# Patient Record
Sex: MALE | Race: WHITE | NOT HISPANIC OR LATINO | Employment: OTHER | URBAN - METROPOLITAN AREA
[De-identification: names, ages, dates, MRNs, and addresses within clinical notes are randomized per-mention and may not be internally consistent; named-entity substitution may affect disease eponyms.]

---

## 2017-03-01 RX ORDER — COLCHICINE 0.6 MG/1
CAPSULE ORAL
Qty: 90 CAPSULE | Refills: 1 | Status: SHIPPED | OUTPATIENT
Start: 2017-03-01 | End: 2017-08-31 | Stop reason: SDUPTHER

## 2017-04-21 ENCOUNTER — OFFICE VISIT (OUTPATIENT)
Dept: RHEUMATOLOGY | Facility: CLINIC | Age: 77
End: 2017-04-21
Payer: MEDICARE

## 2017-04-21 VITALS
HEIGHT: 70 IN | SYSTOLIC BLOOD PRESSURE: 135 MMHG | DIASTOLIC BLOOD PRESSURE: 75 MMHG | WEIGHT: 222.81 LBS | HEART RATE: 65 BPM | BODY MASS INDEX: 31.9 KG/M2

## 2017-04-21 DIAGNOSIS — M11.80 CALCIUM PYROPHOSPHATE ARTHROPATHY: Primary | ICD-10-CM

## 2017-04-21 PROCEDURE — 99213 OFFICE O/P EST LOW 20 MIN: CPT | Mod: PBBFAC | Performed by: INTERNAL MEDICINE

## 2017-04-21 PROCEDURE — 99213 OFFICE O/P EST LOW 20 MIN: CPT | Mod: S$PBB,,, | Performed by: INTERNAL MEDICINE

## 2017-04-21 PROCEDURE — 99999 PR PBB SHADOW E&M-EST. PATIENT-LVL III: CPT | Mod: PBBFAC,,, | Performed by: INTERNAL MEDICINE

## 2017-04-26 NOTE — PROGRESS NOTES
History of present illness:  76-year-old gentleman I saw initially in 2013.  He presented with an inflammatory arthritis.  He had had a previous episode of knee swelling diagnosed as gout.  He also has a history of kidney stones.  Laboratory studies were unremarkable other than hyperparathyroidism.  He eventually underwent a parathyroidectomy.  Imaging revealed chondrocalcinosis and this was felt to be the most likely etiology of his inflammatory arthritis.  He is now taking prednisone twice a week.  He is still having problems with his lower back.  He had 3 epidural blocks without any response.  He has not been going to therapy.  The pain does radiate down the legs.  He has no numbness or tingling in his legs.  He remains on colchicine for his CPPD and his had no acute attacks.  He was started on alendronate and is tolerating it.  He had no response to gabapentin and he stopped it.  He has been taking Aleve as needed but it does not help.  His weight has decreased 25 pounds which she blames on a poor appetite.    Physical examination:  Musculoskeletal: He has bony hypertrophy of the PIPs and DIPs.  He has no synovitis in the hands.  Wrists, elbows, shoulders are unremarkable.  Lumbar spine was not examined.  He has bony hypertrophy of the knees.  He has no synovitis in the lower extremities.    Assessment:  1.  CPPD arthropathy  2.  Osteoarthritis    Plans: Continue medications as before.  Return to see me in 6 months.

## 2017-07-24 ENCOUNTER — PATIENT MESSAGE (OUTPATIENT)
Dept: RHEUMATOLOGY | Facility: CLINIC | Age: 77
End: 2017-07-24

## 2017-07-24 RX ORDER — PREDNISONE 2.5 MG/1
2.5 TABLET ORAL DAILY
Qty: 30 TABLET | Refills: 2 | Status: SHIPPED | OUTPATIENT
Start: 2017-07-24 | End: 2017-10-30 | Stop reason: SDUPTHER

## 2017-08-16 PROBLEM — T45.515A HEMORRHAGE WHILE ON WARFARIN THERAPY: Status: ACTIVE | Noted: 2017-08-16

## 2017-08-16 PROBLEM — R58 HEMORRHAGE WHILE ON WARFARIN THERAPY: Status: ACTIVE | Noted: 2017-08-16

## 2017-08-16 PROBLEM — S37.019A: Status: ACTIVE | Noted: 2017-08-16

## 2017-08-16 PROBLEM — K68.3 NONTRAUMATIC RETROPERITONEAL HEMATOMA: Status: ACTIVE | Noted: 2017-08-16

## 2017-08-18 PROBLEM — E11.9 TYPE 2 DIABETES MELLITUS: Status: ACTIVE | Noted: 2017-08-18

## 2017-08-31 RX ORDER — COLCHICINE 0.6 MG/1
CAPSULE ORAL
Qty: 90 CAPSULE | Refills: 1 | Status: SHIPPED | OUTPATIENT
Start: 2017-08-31 | End: 2018-08-13 | Stop reason: SDUPTHER

## 2017-10-18 ENCOUNTER — OFFICE VISIT (OUTPATIENT)
Dept: RHEUMATOLOGY | Facility: CLINIC | Age: 77
End: 2017-10-18
Payer: MEDICARE

## 2017-10-18 VITALS
SYSTOLIC BLOOD PRESSURE: 131 MMHG | WEIGHT: 220 LBS | BODY MASS INDEX: 31.57 KG/M2 | DIASTOLIC BLOOD PRESSURE: 84 MMHG | HEART RATE: 75 BPM

## 2017-10-18 DIAGNOSIS — M15.9 GENERALIZED OSTEOARTHRITIS: ICD-10-CM

## 2017-10-18 DIAGNOSIS — M11.20 CHONDROCALCINOSIS: Primary | ICD-10-CM

## 2017-10-18 PROCEDURE — 99213 OFFICE O/P EST LOW 20 MIN: CPT | Mod: PBBFAC | Performed by: INTERNAL MEDICINE

## 2017-10-18 PROCEDURE — 99213 OFFICE O/P EST LOW 20 MIN: CPT | Mod: S$PBB,,, | Performed by: INTERNAL MEDICINE

## 2017-10-18 PROCEDURE — 99999 PR PBB SHADOW E&M-EST. PATIENT-LVL III: CPT | Mod: PBBFAC,,, | Performed by: INTERNAL MEDICINE

## 2017-10-18 ASSESSMENT — ROUTINE ASSESSMENT OF PATIENT INDEX DATA (RAPID3)
TOTAL RAPID3 SCORE: 1.83
PATIENT GLOBAL ASSESSMENT SCORE: 3
PAIN SCORE: 1.5
FATIGUE SCORE: 4
MDHAQ FUNCTION SCORE: .3
AM STIFFNESS SCORE: 1, YES
PSYCHOLOGICAL DISTRESS SCORE: .2
WHEN YOU AWAKENED IN THE MORNING OVER THE LAST WEEK, PLEASE INDICATE THE AMOUNT OF TIME IT TAKES UNTIL YOU ARE AS LIMBER AS YOU WILL BE FOR THE DAY: 1 HR.

## 2017-10-25 NOTE — PROGRESS NOTES
History of present illness: 76-year-old gentleman with osteoarthritis.  He has also had a recurrence of an intermittent arthritis compatible with pseudogout.  He has had no recent attacks.  He continues to take colchicine and prednisone 5 mg daily.  He had an epidural block for sciatica which helped.  He is on chronic anticoagulation.  He was hospitalized recently for spontaneous renal hematoma.  He is otherwise been doing well.    Physical examination:  Musculoskeletal: He has decreased range of motion shoulders bilaterally.  Elbows and wrist are unremarkable.  He has bony hypertrophy of the PIPs and DIPs.  He has decreased  strength.  Lumbar spine was not examined.  He has postoperative changes in the knees.  Ankles and feet are unremarkable.    Assessment:  1.  Osteoarthritis  2.  Chondrocalcinosis  3.  Osteoporosis    Plans:  1.  I recommend he try to taper his prednisone  2.  Continue colchicine as before  3.  Continue alendronate.  He will be due for his next bone density in July.  4.  Return to see me in 6 months

## 2017-10-30 RX ORDER — PREDNISONE 2.5 MG/1
TABLET ORAL
Qty: 30 TABLET | Refills: 3 | Status: SHIPPED | OUTPATIENT
Start: 2017-10-30 | End: 2018-09-07

## 2017-10-30 RX ORDER — ALENDRONATE SODIUM 70 MG/1
TABLET ORAL
Qty: 4 TABLET | Refills: 12 | Status: SHIPPED | OUTPATIENT
Start: 2017-10-30 | End: 2019-02-19 | Stop reason: SDUPTHER

## 2018-04-18 ENCOUNTER — PATIENT MESSAGE (OUTPATIENT)
Dept: RHEUMATOLOGY | Facility: CLINIC | Age: 78
End: 2018-04-18

## 2018-08-09 RX ORDER — COLCHICINE 0.6 MG/1
CAPSULE ORAL
Qty: 90 CAPSULE | OUTPATIENT
Start: 2018-08-09

## 2018-08-13 ENCOUNTER — PATIENT MESSAGE (OUTPATIENT)
Dept: RHEUMATOLOGY | Facility: CLINIC | Age: 78
End: 2018-08-13

## 2018-08-14 RX ORDER — COLCHICINE 0.6 MG/1
1 CAPSULE ORAL DAILY
Qty: 90 CAPSULE | Refills: 0 | Status: SHIPPED | OUTPATIENT
Start: 2018-08-14 | End: 2018-09-24 | Stop reason: SDUPTHER

## 2018-09-07 ENCOUNTER — OFFICE VISIT (OUTPATIENT)
Dept: RHEUMATOLOGY | Facility: CLINIC | Age: 78
End: 2018-09-07
Payer: MEDICARE

## 2018-09-07 VITALS
BODY MASS INDEX: 31.35 KG/M2 | SYSTOLIC BLOOD PRESSURE: 133 MMHG | DIASTOLIC BLOOD PRESSURE: 84 MMHG | HEART RATE: 60 BPM | HEIGHT: 70 IN | WEIGHT: 219 LBS

## 2018-09-07 DIAGNOSIS — M15.9 GENERALIZED OSTEOARTHRITIS: ICD-10-CM

## 2018-09-07 DIAGNOSIS — M11.20 CHONDROCALCINOSIS: Primary | ICD-10-CM

## 2018-09-07 PROCEDURE — 99999 PR PBB SHADOW E&M-EST. PATIENT-LVL III: CPT | Mod: PBBFAC,,, | Performed by: INTERNAL MEDICINE

## 2018-09-07 PROCEDURE — 99213 OFFICE O/P EST LOW 20 MIN: CPT | Mod: S$PBB,,, | Performed by: INTERNAL MEDICINE

## 2018-09-07 PROCEDURE — 99213 OFFICE O/P EST LOW 20 MIN: CPT | Mod: PBBFAC | Performed by: INTERNAL MEDICINE

## 2018-09-07 RX ORDER — RIVAROXABAN 10 MG/1
10 TABLET, FILM COATED ORAL
Refills: 2 | COMMUNITY
Start: 2018-08-22

## 2018-09-07 RX ORDER — ESCITALOPRAM OXALATE 10 MG/1
10 TABLET ORAL DAILY
Refills: 0 | COMMUNITY
Start: 2018-08-22 | End: 2021-08-25

## 2018-09-24 RX ORDER — COLCHICINE 0.6 MG/1
CAPSULE ORAL
Qty: 90 CAPSULE | Refills: 0 | Status: SHIPPED | OUTPATIENT
Start: 2018-09-24 | End: 2019-04-09 | Stop reason: SDUPTHER

## 2019-02-19 DIAGNOSIS — M85.89 OSTEOPENIA OF MULTIPLE SITES: ICD-10-CM

## 2019-02-19 DIAGNOSIS — M85.839 OSTEOPENIA OF FOREARM, UNSPECIFIED LATERALITY: ICD-10-CM

## 2019-02-20 PROBLEM — M85.839 OSTEOPENIA OF FOREARM: Status: ACTIVE | Noted: 2019-02-20

## 2019-02-20 PROBLEM — M85.89 OSTEOPENIA OF MULTIPLE SITES: Status: ACTIVE | Noted: 2019-02-20

## 2019-02-20 RX ORDER — ALENDRONATE SODIUM 70 MG/1
TABLET ORAL
Qty: 4 TABLET | Refills: 2 | Status: SHIPPED | OUTPATIENT
Start: 2019-02-20 | End: 2019-06-03 | Stop reason: SDUPTHER

## 2019-04-09 RX ORDER — COLCHICINE 0.6 MG/1
TABLET ORAL
Qty: 90 TABLET | Refills: 0 | Status: SHIPPED | OUTPATIENT
Start: 2019-04-09 | End: 2019-08-30 | Stop reason: SDUPTHER

## 2019-06-03 DIAGNOSIS — M85.89 OSTEOPENIA OF MULTIPLE SITES: ICD-10-CM

## 2019-06-03 RX ORDER — ALENDRONATE SODIUM 70 MG/1
TABLET ORAL
Qty: 4 TABLET | Refills: 2 | Status: SHIPPED | OUTPATIENT
Start: 2019-06-03 | End: 2019-08-26 | Stop reason: SDUPTHER

## 2019-07-31 ENCOUNTER — PATIENT MESSAGE (OUTPATIENT)
Dept: RHEUMATOLOGY | Facility: CLINIC | Age: 79
End: 2019-07-31

## 2019-08-01 ENCOUNTER — TELEPHONE (OUTPATIENT)
Dept: RHEUMATOLOGY | Facility: CLINIC | Age: 79
End: 2019-08-01

## 2019-08-01 ENCOUNTER — PATIENT MESSAGE (OUTPATIENT)
Dept: RHEUMATOLOGY | Facility: CLINIC | Age: 79
End: 2019-08-01

## 2019-08-26 DIAGNOSIS — M85.89 OSTEOPENIA OF MULTIPLE SITES: ICD-10-CM

## 2019-08-26 RX ORDER — ALENDRONATE SODIUM 70 MG/1
TABLET ORAL
Qty: 4 TABLET | Refills: 2 | Status: SHIPPED | OUTPATIENT
Start: 2019-08-26 | End: 2019-11-22 | Stop reason: SDUPTHER

## 2019-08-30 RX ORDER — COLCHICINE 0.6 MG/1
TABLET ORAL
Qty: 90 TABLET | Refills: 0 | Status: SHIPPED | OUTPATIENT
Start: 2019-08-30 | End: 2020-01-10 | Stop reason: SDUPTHER

## 2019-11-22 DIAGNOSIS — M85.89 OSTEOPENIA OF MULTIPLE SITES: ICD-10-CM

## 2019-11-22 RX ORDER — ALENDRONATE SODIUM 70 MG/1
TABLET ORAL
Qty: 4 TABLET | Refills: 0 | Status: SHIPPED | OUTPATIENT
Start: 2019-11-22 | End: 2020-01-27

## 2020-01-10 ENCOUNTER — HOSPITAL ENCOUNTER (OUTPATIENT)
Dept: RADIOLOGY | Facility: CLINIC | Age: 80
Discharge: HOME | End: 2020-01-10
Attending: INTERNAL MEDICINE
Payer: MEDICARE

## 2020-01-10 ENCOUNTER — OFFICE VISIT (OUTPATIENT)
Dept: RHEUMATOLOGY | Facility: CLINIC | Age: 80
End: 2020-01-10
Payer: MEDICARE

## 2020-01-10 VITALS
HEIGHT: 70 IN | DIASTOLIC BLOOD PRESSURE: 82 MMHG | TEMPERATURE: 98 F | SYSTOLIC BLOOD PRESSURE: 128 MMHG | HEART RATE: 71 BPM | BODY MASS INDEX: 33.02 KG/M2 | WEIGHT: 230.63 LBS

## 2020-01-10 DIAGNOSIS — M15.9 GENERALIZED OSTEOARTHRITIS: ICD-10-CM

## 2020-01-10 DIAGNOSIS — M11.20 CHONDROCALCINOSIS: Primary | ICD-10-CM

## 2020-01-10 DIAGNOSIS — M85.89 OSTEOPENIA OF MULTIPLE SITES: ICD-10-CM

## 2020-01-10 PROCEDURE — 77080 DEXA BONE DENSITY SPINE HIP: ICD-10-PCS | Mod: 26,,, | Performed by: INTERNAL MEDICINE

## 2020-01-10 PROCEDURE — 1126F PR PAIN SEVERITY QUANTIFIED, NO PAIN PRESENT: ICD-10-PCS | Mod: ,,, | Performed by: INTERNAL MEDICINE

## 2020-01-10 PROCEDURE — 1159F PR MEDICATION LIST DOCUMENTED IN MEDICAL RECORD: ICD-10-PCS | Mod: ,,, | Performed by: INTERNAL MEDICINE

## 2020-01-10 PROCEDURE — 99213 PR OFFICE/OUTPT VISIT, EST, LEVL III, 20-29 MIN: ICD-10-PCS | Mod: S$PBB,,, | Performed by: INTERNAL MEDICINE

## 2020-01-10 PROCEDURE — 1126F AMNT PAIN NOTED NONE PRSNT: CPT | Mod: ,,, | Performed by: INTERNAL MEDICINE

## 2020-01-10 PROCEDURE — 99999 PR PBB SHADOW E&M-EST. PATIENT-LVL III: CPT | Mod: PBBFAC,,, | Performed by: INTERNAL MEDICINE

## 2020-01-10 PROCEDURE — 99999 PR PBB SHADOW E&M-EST. PATIENT-LVL III: ICD-10-PCS | Mod: PBBFAC,,, | Performed by: INTERNAL MEDICINE

## 2020-01-10 PROCEDURE — 99213 OFFICE O/P EST LOW 20 MIN: CPT | Mod: S$PBB,,, | Performed by: INTERNAL MEDICINE

## 2020-01-10 PROCEDURE — 99213 OFFICE O/P EST LOW 20 MIN: CPT | Mod: PBBFAC,25 | Performed by: INTERNAL MEDICINE

## 2020-01-10 PROCEDURE — 1159F MED LIST DOCD IN RCRD: CPT | Mod: ,,, | Performed by: INTERNAL MEDICINE

## 2020-01-10 PROCEDURE — 77080 DXA BONE DENSITY AXIAL: CPT | Mod: TC

## 2020-01-10 PROCEDURE — 77080 DXA BONE DENSITY AXIAL: CPT | Mod: 26,,, | Performed by: INTERNAL MEDICINE

## 2020-01-10 RX ORDER — COLCHICINE 0.6 MG/1
0.6 TABLET ORAL DAILY
Qty: 30 TABLET | Refills: 6 | Status: SHIPPED | OUTPATIENT
Start: 2020-01-10 | End: 2020-12-03

## 2020-01-10 ASSESSMENT — ROUTINE ASSESSMENT OF PATIENT INDEX DATA (RAPID3)
PSYCHOLOGICAL DISTRESS SCORE: 3.3
WHEN YOU AWAKENED IN THE MORNING OVER THE LAST WEEK, PLEASE INDICATE THE AMOUNT OF TIME IT TAKES UNTIL YOU ARE AS LIMBER AS YOU WILL BE FOR THE DAY: 1 HR
AM STIFFNESS SCORE: 1, YES
FATIGUE SCORE: 4.5
PATIENT GLOBAL ASSESSMENT SCORE: 4
MDHAQ FUNCTION SCORE: 1.6
TOTAL RAPID3 SCORE: 4.44
PAIN SCORE: 4

## 2020-01-11 NOTE — PROGRESS NOTES
History of present illness:  79-year-old gentleman with a long history of osteoarthritis.  He developed an intermittent inflammatory arthritis compatible with pseudogout.  He is on Xarelto chronically.  He cannot take anti-inflammatory medicines.  He had been taking colchicine.  He is unable to tolerated on a regular basis because of diarrhea.  It does help when he takes it.  He complains of increased aching in his hands when he is not taking colchicine.  He has had no joint swelling.  He had an episode of shingles which has resolved.  He has had no other recent medical problems.  He has also been on long term alendronate.    Physical examination:  Musculoskeletal:  Shoulders, elbows, wrists are unremarkable.  He has bony hypertrophy of the PIPs and DIPs.  He has no synovitis in the hands.  Hips have good range of motion without pain on range of motion.  He has bony hypertrophy of the knees but no effusion.  Ankles and feet are unremarkable.    Assessment:  1.  Osteoarthritis  2.  Chondrocalcinosis    Plans:  Continue to take colchicine intermittently.  2.  He may also take Tylenol as needed for pain  3.  Laboratory studies obtained  4.  I have referred him for a DEXA scan  5.  Return in 12 months

## 2020-01-27 DIAGNOSIS — M85.89 OSTEOPENIA OF MULTIPLE SITES: ICD-10-CM

## 2020-01-27 RX ORDER — ALENDRONATE SODIUM 70 MG/1
TABLET ORAL
Qty: 4 TABLET | Refills: 12 | Status: SHIPPED | OUTPATIENT
Start: 2020-01-27 | End: 2021-02-17

## 2020-11-20 ENCOUNTER — TELEPHONE (OUTPATIENT)
Dept: RHEUMATOLOGY | Facility: CLINIC | Age: 80
End: 2020-11-20

## 2020-11-20 NOTE — TELEPHONE ENCOUNTER
----- Message from Ej Costa MD sent at 11/19/2020  4:34 PM CST -----  Contact: [phone] -wife/Ceci  He needs to see a primary care provider to start the evaluation.  He lives in [place] which should be okay.  Otherwise he could see someone here in primary care.  ----- Message -----  From: Stephanie Griggs MA  Sent: 11/19/2020   2:48 PM CST  To: jE Costa MD      ----- Message -----  From: Julita English MA  Sent: 11/19/2020   2:40 PM CST  To: [facility] Staff      Current pt of Dr Costa, pts wife states that he has diarrhea, loss of appetite and weight loss and wanted to know if Dr Costa could recommended someone  for him to see to do a work up on him?   [phone] -wife/Ceci

## 2020-11-24 ENCOUNTER — TELEPHONE (OUTPATIENT)
Dept: RHEUMATOLOGY | Facility: CLINIC | Age: 80
End: 2020-11-24

## 2020-11-24 DIAGNOSIS — R63.0 ANOREXIA: ICD-10-CM

## 2020-11-24 DIAGNOSIS — R19.7 DIARRHEA, UNSPECIFIED TYPE: ICD-10-CM

## 2020-11-24 DIAGNOSIS — R63.4 WEIGHT LOSS: Primary | ICD-10-CM

## 2020-11-24 NOTE — TELEPHONE ENCOUNTER
Called to relay that we were able to get him an appt on Monday and if they needed anything else to contact that office or they could give us a call back

## 2020-11-24 NOTE — TELEPHONE ENCOUNTER
----- Message from Radha Snyder MA sent at 11/23/2020  2:52 PM CST -----  Contact: Ceci Howe Patient wife    ----- Message -----  From: Jeannie Gutierrez  Sent: 11/23/2020   2:02 PM CST  To: [facility] Staff    Ceci Howe, Patient wife is calling to speak with Nurse in regards to patient having diarrhea, loss of appetite and weight loss.  Also state that patient is on depression medication but it does not seem to be working.        Communication:  [phone]

## 2020-11-24 NOTE — TELEPHONE ENCOUNTER
He had seen his primary care doctor in [place] but she does not feel he is getting anywhere.  I will refer him to Internal Medicine.

## 2020-11-24 NOTE — TELEPHONE ENCOUNTER
Spoke to ms ortega on behalf of her  I gave them the phone number to internal medicine so they can set up an appt I also contacted jaime krishnamurthy to speed up the process with getting them in for an appt

## 2020-12-01 ENCOUNTER — PATIENT OUTREACH (OUTPATIENT)
Dept: ADMINISTRATIVE | Facility: HOSPITAL | Age: 80
End: 2020-12-01

## 2020-12-01 DIAGNOSIS — Z11.59 NEED FOR HEPATITIS C SCREENING TEST: Primary | ICD-10-CM

## 2020-12-03 ENCOUNTER — OFFICE VISIT (OUTPATIENT)
Dept: INTERNAL MEDICINE | Facility: CLINIC | Age: 80
End: 2020-12-03
Payer: MEDICARE

## 2020-12-03 ENCOUNTER — OFFICE VISIT (OUTPATIENT)
Dept: GASTROENTEROLOGY | Facility: CLINIC | Age: 80
End: 2020-12-03
Payer: MEDICARE

## 2020-12-03 ENCOUNTER — LAB VISIT (OUTPATIENT)
Dept: LAB | Facility: HOSPITAL | Age: 80
End: 2020-12-03
Attending: INTERNAL MEDICINE
Payer: MEDICARE

## 2020-12-03 VITALS
OXYGEN SATURATION: 97 % | WEIGHT: 219.81 LBS | SYSTOLIC BLOOD PRESSURE: 158 MMHG | BODY MASS INDEX: 31.47 KG/M2 | DIASTOLIC BLOOD PRESSURE: 86 MMHG | HEART RATE: 77 BPM | HEIGHT: 70 IN

## 2020-12-03 VITALS
SYSTOLIC BLOOD PRESSURE: 184 MMHG | HEIGHT: 70 IN | DIASTOLIC BLOOD PRESSURE: 99 MMHG | HEART RATE: 74 BPM | BODY MASS INDEX: 31.28 KG/M2 | WEIGHT: 218.5 LBS

## 2020-12-03 DIAGNOSIS — Z80.0 FAMILY HISTORY OF PANCREATIC CANCER: ICD-10-CM

## 2020-12-03 DIAGNOSIS — R10.13 EPIGASTRIC PAIN: ICD-10-CM

## 2020-12-03 DIAGNOSIS — E11.9 CONTROLLED TYPE 2 DIABETES MELLITUS WITHOUT COMPLICATION, WITHOUT LONG-TERM CURRENT USE OF INSULIN: ICD-10-CM

## 2020-12-03 DIAGNOSIS — Z80.0 FAMILY HISTORY OF LIVER CANCER: ICD-10-CM

## 2020-12-03 DIAGNOSIS — R19.7 DIARRHEA, UNSPECIFIED TYPE: ICD-10-CM

## 2020-12-03 DIAGNOSIS — R63.0 ANOREXIA: Primary | ICD-10-CM

## 2020-12-03 DIAGNOSIS — R63.0 ANOREXIA: ICD-10-CM

## 2020-12-03 DIAGNOSIS — R63.4 WEIGHT LOSS, UNINTENTIONAL: ICD-10-CM

## 2020-12-03 DIAGNOSIS — R68.81 EARLY SATIETY: ICD-10-CM

## 2020-12-03 DIAGNOSIS — R63.0 LOSS OF APPETITE: ICD-10-CM

## 2020-12-03 DIAGNOSIS — R63.4 WEIGHT LOSS: ICD-10-CM

## 2020-12-03 DIAGNOSIS — R19.7 DIARRHEA, UNSPECIFIED TYPE: Primary | ICD-10-CM

## 2020-12-03 DIAGNOSIS — E66.9 OBESITY (BMI 30-39.9): ICD-10-CM

## 2020-12-03 DIAGNOSIS — D50.9 IRON DEFICIENCY ANEMIA, UNSPECIFIED IRON DEFICIENCY ANEMIA TYPE: ICD-10-CM

## 2020-12-03 LAB
ALBUMIN SERPL BCP-MCNC: 3.9 G/DL (ref 3.5–5.2)
ALBUMIN SERPL BCP-MCNC: 3.9 G/DL (ref 3.5–5.2)
ALP SERPL-CCNC: 71 U/L (ref 55–135)
ALP SERPL-CCNC: 71 U/L (ref 55–135)
ALT SERPL W/O P-5'-P-CCNC: 12 U/L (ref 10–44)
ALT SERPL W/O P-5'-P-CCNC: 12 U/L (ref 10–44)
ANION GAP SERPL CALC-SCNC: 10 MMOL/L (ref 8–16)
ANION GAP SERPL CALC-SCNC: 10 MMOL/L (ref 8–16)
AST SERPL-CCNC: 23 U/L (ref 10–40)
AST SERPL-CCNC: 23 U/L (ref 10–40)
BASOPHILS # BLD AUTO: 0.03 K/UL (ref 0–0.2)
BASOPHILS NFR BLD: 0.5 % (ref 0–1.9)
BILIRUB DIRECT SERPL-MCNC: 0.7 MG/DL (ref 0.1–0.3)
BILIRUB SERPL-MCNC: 1.9 MG/DL (ref 0.1–1)
BILIRUB SERPL-MCNC: 1.9 MG/DL (ref 0.1–1)
BUN SERPL-MCNC: 12 MG/DL (ref 8–23)
BUN SERPL-MCNC: 12 MG/DL (ref 8–23)
CALCIUM SERPL-MCNC: 8.7 MG/DL (ref 8.7–10.5)
CALCIUM SERPL-MCNC: 8.7 MG/DL (ref 8.7–10.5)
CHLORIDE SERPL-SCNC: 105 MMOL/L (ref 95–110)
CHLORIDE SERPL-SCNC: 105 MMOL/L (ref 95–110)
CO2 SERPL-SCNC: 29 MMOL/L (ref 23–29)
CO2 SERPL-SCNC: 29 MMOL/L (ref 23–29)
CORTIS SERPL-MCNC: 5.7 UG/DL
CREAT SERPL-MCNC: 1.1 MG/DL (ref 0.5–1.4)
CREAT SERPL-MCNC: 1.1 MG/DL (ref 0.5–1.4)
DIFFERENTIAL METHOD: ABNORMAL
EOSINOPHIL # BLD AUTO: 0.2 K/UL (ref 0–0.5)
EOSINOPHIL NFR BLD: 2.7 % (ref 0–8)
ERYTHROCYTE [DISTWIDTH] IN BLOOD BY AUTOMATED COUNT: 13.9 % (ref 11.5–14.5)
EST. GFR  (AFRICAN AMERICAN): >60 ML/MIN/1.73 M^2
EST. GFR  (AFRICAN AMERICAN): >60 ML/MIN/1.73 M^2
EST. GFR  (NON AFRICAN AMERICAN): >60 ML/MIN/1.73 M^2
EST. GFR  (NON AFRICAN AMERICAN): >60 ML/MIN/1.73 M^2
ESTIMATED AVG GLUCOSE: 123 MG/DL (ref 68–131)
FERRITIN SERPL-MCNC: 17 NG/ML (ref 20–300)
GLUCOSE SERPL-MCNC: 84 MG/DL (ref 70–110)
GLUCOSE SERPL-MCNC: 84 MG/DL (ref 70–110)
HBA1C MFR BLD HPLC: 5.9 % (ref 4–5.6)
HCT VFR BLD AUTO: 36.3 % (ref 40–54)
HGB BLD-MCNC: 11.2 G/DL (ref 14–18)
IGA SERPL-MCNC: 234 MG/DL (ref 40–350)
IMM GRANULOCYTES # BLD AUTO: 0.01 K/UL (ref 0–0.04)
IMM GRANULOCYTES NFR BLD AUTO: 0.2 % (ref 0–0.5)
INR PPP: 1 (ref 0.8–1.2)
IRON SERPL-MCNC: 65 UG/DL (ref 45–160)
LIPASE SERPL-CCNC: 27 U/L (ref 4–60)
LYMPHOCYTES # BLD AUTO: 1.6 K/UL (ref 1–4.8)
LYMPHOCYTES NFR BLD: 29.3 % (ref 18–48)
MCH RBC QN AUTO: 29.4 PG (ref 27–31)
MCHC RBC AUTO-ENTMCNC: 30.9 G/DL (ref 32–36)
MCV RBC AUTO: 95 FL (ref 82–98)
MONOCYTES # BLD AUTO: 0.5 K/UL (ref 0.3–1)
MONOCYTES NFR BLD: 8.4 % (ref 4–15)
NEUTROPHILS # BLD AUTO: 3.2 K/UL (ref 1.8–7.7)
NEUTROPHILS NFR BLD: 58.9 % (ref 38–73)
NRBC BLD-RTO: 0 /100 WBC
PLATELET # BLD AUTO: 190 K/UL (ref 150–350)
PMV BLD AUTO: 10 FL (ref 9.2–12.9)
POTASSIUM SERPL-SCNC: 3.9 MMOL/L (ref 3.5–5.1)
POTASSIUM SERPL-SCNC: 3.9 MMOL/L (ref 3.5–5.1)
PROT SERPL-MCNC: 7.3 G/DL (ref 6–8.4)
PROT SERPL-MCNC: 7.3 G/DL (ref 6–8.4)
PROTHROMBIN TIME: 11.2 SEC (ref 9–12.5)
RBC # BLD AUTO: 3.81 M/UL (ref 4.6–6.2)
SATURATED IRON: 13 % (ref 20–50)
SODIUM SERPL-SCNC: 144 MMOL/L (ref 136–145)
SODIUM SERPL-SCNC: 144 MMOL/L (ref 136–145)
TOTAL IRON BINDING CAPACITY: 482 UG/DL (ref 250–450)
TRANSFERRIN SERPL-MCNC: 326 MG/DL (ref 200–375)
TSH SERPL DL<=0.005 MIU/L-ACNC: 3.02 UIU/ML (ref 0.4–4)
WBC # BLD AUTO: 5.5 K/UL (ref 3.9–12.7)

## 2020-12-03 PROCEDURE — 99999 PR PBB SHADOW E&M-EST. PATIENT-LVL III: CPT | Mod: PBBFAC,,, | Performed by: INTERNAL MEDICINE

## 2020-12-03 PROCEDURE — 83690 ASSAY OF LIPASE: CPT

## 2020-12-03 PROCEDURE — 99999 PR PBB SHADOW E&M-EST. PATIENT-LVL IV: ICD-10-PCS | Mod: PBBFAC,,, | Performed by: INTERNAL MEDICINE

## 2020-12-03 PROCEDURE — 86790 VIRUS ANTIBODY NOS: CPT

## 2020-12-03 PROCEDURE — 84443 ASSAY THYROID STIM HORMONE: CPT

## 2020-12-03 PROCEDURE — 82728 ASSAY OF FERRITIN: CPT

## 2020-12-03 PROCEDURE — 82941 ASSAY OF GASTRIN: CPT

## 2020-12-03 PROCEDURE — 87340 HEPATITIS B SURFACE AG IA: CPT

## 2020-12-03 PROCEDURE — 86706 HEP B SURFACE ANTIBODY: CPT

## 2020-12-03 PROCEDURE — 86803 HEPATITIS C AB TEST: CPT

## 2020-12-03 PROCEDURE — 99999 PR PBB SHADOW E&M-EST. PATIENT-LVL IV: CPT | Mod: PBBFAC,,, | Performed by: INTERNAL MEDICINE

## 2020-12-03 PROCEDURE — 83516 IMMUNOASSAY NONANTIBODY: CPT

## 2020-12-03 PROCEDURE — 99205 OFFICE O/P NEW HI 60 MIN: CPT | Mod: S$PBB,,, | Performed by: INTERNAL MEDICINE

## 2020-12-03 PROCEDURE — 82784 ASSAY IGA/IGD/IGG/IGM EACH: CPT

## 2020-12-03 PROCEDURE — 80053 COMPREHEN METABOLIC PANEL: CPT

## 2020-12-03 PROCEDURE — 99204 OFFICE O/P NEW MOD 45 MIN: CPT | Mod: S$PBB,,, | Performed by: INTERNAL MEDICINE

## 2020-12-03 PROCEDURE — 82533 TOTAL CORTISOL: CPT

## 2020-12-03 PROCEDURE — 83540 ASSAY OF IRON: CPT

## 2020-12-03 PROCEDURE — 99999 PR PBB SHADOW E&M-EST. PATIENT-LVL III: ICD-10-PCS | Mod: PBBFAC,,, | Performed by: INTERNAL MEDICINE

## 2020-12-03 PROCEDURE — 85610 PROTHROMBIN TIME: CPT

## 2020-12-03 PROCEDURE — 85025 COMPLETE CBC W/AUTO DIFF WBC: CPT

## 2020-12-03 PROCEDURE — 99205 PR OFFICE/OUTPT VISIT, NEW, LEVL V, 60-74 MIN: ICD-10-PCS | Mod: S$PBB,,, | Performed by: INTERNAL MEDICINE

## 2020-12-03 PROCEDURE — 83036 HEMOGLOBIN GLYCOSYLATED A1C: CPT

## 2020-12-03 PROCEDURE — 99204 PR OFFICE/OUTPT VISIT, NEW, LEVL IV, 45-59 MIN: ICD-10-PCS | Mod: S$PBB,,, | Performed by: INTERNAL MEDICINE

## 2020-12-03 PROCEDURE — 80076 HEPATIC FUNCTION PANEL: CPT

## 2020-12-03 PROCEDURE — 99214 OFFICE O/P EST MOD 30 MIN: CPT | Mod: PBBFAC,27 | Performed by: INTERNAL MEDICINE

## 2020-12-03 PROCEDURE — 99213 OFFICE O/P EST LOW 20 MIN: CPT | Mod: PBBFAC | Performed by: INTERNAL MEDICINE

## 2020-12-03 NOTE — PROGRESS NOTES
Subjective:       Patient ID: Placido Howe is a 79 y.o. male.    Chief Complaint: Diarrhea    79 year old man reports watery diarrhea 3-4 days a week, twice a day.  Has poor appetite,eating only one meal a day though he does eat snacks.  Eats lots of cheese.  Has been started on cholestipol by PCP in [place]  This helps intermittently.  I do not think he takes it consistently.  Has lost 11 pounds in the last 11 months    Review of Systems   Constitutional: Negative for activity change, appetite change and fever.   HENT: Negative for congestion, postnasal drip and sore throat.    Respiratory: Negative for cough, shortness of breath and wheezing.    Cardiovascular: Negative for chest pain and palpitations.   Gastrointestinal: Negative for abdominal pain, blood in stool, constipation, diarrhea, nausea and vomiting.   Genitourinary: Negative for decreased urine volume, difficulty urinating, flank pain and frequency.   Musculoskeletal: Negative for arthralgias.   Neurological: Negative for dizziness, weakness and headaches.       Objective:      Physical Exam  Constitutional:       General: He is not in acute distress.     Appearance: He is well-developed.       HENT:      Head: Normocephalic and atraumatic.      Right Ear: External ear normal.      Left Ear: External ear normal.   Eyes:      Conjunctiva/sclera: Conjunctivae normal.      Pupils: Pupils are equal, round, and reactive to light.   Neck:      Musculoskeletal: Normal range of motion and neck supple.      Thyroid: No thyromegaly.   Cardiovascular:      Rate and Rhythm: Normal rate and regular rhythm.   Pulmonary:      Effort: Pulmonary effort is normal.      Breath sounds: Normal breath sounds.   Abdominal:      General: Bowel sounds are normal.      Palpations: Abdomen is soft. There is no mass.      Tenderness: There is abdominal tenderness in the right upper quadrant and epigastric area. There is no right CVA tenderness, left CVA tenderness, guarding or  rebound.   Musculoskeletal: Normal range of motion.   Lymphadenopathy:      Cervical: No cervical adenopathy.   Skin:     General: Skin is warm and dry.   Neurological:      Mental Status: He is alert and oriented to person, place, and time.      Cranial Nerves: No cranial nerve deficit.      Motor: No abnormal muscle tone.      Coordination: Coordination normal.      Deep Tendon Reflexes: Reflexes are normal and symmetric. Reflexes normal.         Assessment:       1. Diarrhea, unspecified type    2. Loss of appetite    3. Controlled type 2 diabetes mellitus without complication, without long-term current use of insulin        Plan:   Placido was seen today for diarrhea.    Diagnoses and all orders for this visit:    Diarrhea, unspecified type  -     CBC Auto Differential; Future  -     Comprehensive Metabolic Panel; Future  -     Lipase; Future  -     Stool culture; Future  -     Stool Exam-Ova,Cysts,Parasites; Future  -     Ambulatory referral/consult to Gastroenterology; Future    Loss of appetite  -     CBC Auto Differential; Future  -     Comprehensive Metabolic Panel; Future  -     Lipase; Future  -     Ambulatory referral/consult to Gastroenterology; Future    Controlled type 2 diabetes mellitus without complication, without long-term current use of insulin  -     Hemoglobin A1C; Future

## 2020-12-03 NOTE — Clinical Note
VERY IMPORTANT:    Ira please schedule patient for lab work today on the 2nd floor orders placed    Please set him up for urgent CT scan abdomen pelvis pancreas mass protocol across the street at the [facility] [place] orders placed    Please schedule patient for stool studies x4  least 2 days apart 1 of the stools needs to be a solid stool please label that fecal elastase okay for him to turn that in close to home.    Please schedule patient to return to GI clinic appointment with me in 4 weeks for follow-up for weight loss diarrhea and abdominal

## 2020-12-03 NOTE — Clinical Note
Zarina please schedule Placido is soon as possible for EGD and upper EUS of the pancreas for further evaluation of his significant weight loss epigastric pain anti recs see a early satiety diarrhea and family history of pancreatic cancer in his first-degree relative his dad at age 63 referral placed

## 2020-12-03 NOTE — PROGRESS NOTES
[facility] Consultation Note    Reason for Consult:  The primary encounter diagnosis was Anorexia. Diagnoses of Diarrhea, unspecified type, Loss of appetite, Epigastric pain, Early satiety, Family history of pancreatic cancer, Family history of liver cancer, Iron deficiency anemia, unspecified iron deficiency anemia type, Weight loss, unintentional, and Obesity (BMI 30-39.9) were also pertinent to this visit.    PCP:   Mauricio Brewer   [address] [facility] [address]    Referring MD:  Baylee Patel Md  [address] [place] [address]    Initial History of Present Illness (HPI):  This is a 79 y.o. male here for evaluation of 1 year history of diarrhea early satiety anorexia weight loss.  Patient is here with his wife they live in [facility].  He says his colonoscopy was in the last year a too was normal.  But before his symptoms started.  He drinks alcohol but in moderation he does not smoke cigarettes his dad was diagnosed with pancreatic cancer at age 63 and  6 months and it may have cause metastasis to the liver.  Patient has decreased appetite anorexia epigastric pain and diarrhea.  He has also lost 6 significant amount of weight he used to weigh 230 lb he is down to 218 lb today.  He has not had a recent CT scan of his abdomen pelvis or any EGD.  No other family members with pancreatic cancer no other family members with any GI malignancies.  No FAP no attenuated FAP no MA P no Narayan syndrome no celiac sprue or inflammatory bowel disease.  No blood in his stool he is currently only having 1 bowel movement a day down from 3 but it is kind of pasty.  He denies seeing oral grease or fat in his stool.  No recent antibiotics no recent travel no drinking at a [employer] or [place] no new pets.    Abdominal pain - mild epigastric pain does not radiate to his back  Reflux - no  Dysphagia - no   Bowel habits - diarrhea  GI bleeding - none  NSAID usage -  yes    Interval HPI 12/03/2020:  The patient's last visit with me was on Visit date not found.      ROS:  Constitutional: No fevers, chills, positive for unintentional weight loss anorexia early satiety  ENT:  No heartburn no dysphagia no odynophagia no hoarseness  CV: No chest pain, no palpitation, patient has pacemaker  Pulm: No cough, No shortness of breath, no wheezing  Ophtho: No vision changes  GI: see HPI  Derm: No rash, no itching  Heme: No lymphadenopathy, No easy bruising  MSK:  Some arthritis  : No dysuria, No hematuria  Endo: No hot or cold intolerance  Neuro: No syncope, No seizure, no strokes  Psych: No uncontrolled anxiety, No uncontrolled depression    Medical History:  has a past medical history of Alopecia, Anemia, Anxiety, Arthritis, Deep vein thrombosis, Depression, Diabetes mellitus, Hyperlipidemia, Hypertension, Hypertension (4/3/2014), Impaired glucose tolerance (4/3/2014), Obesity (BMI 30-39.9) (4/3/2014), Pulmonary embolism, S/P parathyroidectomy (8/29/2014), Type 2 diabetes mellitus, uncontrolled (4/7/2014), Vitamin B12 deficiency (7/10/2014), and Vitamin D deficiency (7/10/2014).    Surgical History:  has a past surgical history that includes Cardiac pacemaker placement; Joint replacement; Knee arthroscopy w/ meniscal repair; Cholecystectomy; IVC filter retrieval; carpel tunnel; Thyroid surgery (08/2014); Cataract extraction (2001); Tonsillectomy; Carpal tunnel release (2013); Cardiac pacemaker placement; and Cardiac pacemaker placement.    Family History: family history includes Alzheimer's disease in his mother; Breast cancer in his sister; Cancer in his father; Cancer (age of onset: 8) in his brother; Diabetes Mellitus in his mother; Leukemia in his brother; Liver cancer (age of onset: 63) in his father; Pancreatic cancer (age of onset: 63) in his father; Rheum arthritis in his mother..     Social History:  reports that he has never smoked. He has never used smokeless tobacco. He  "reports current alcohol use. He reports that he does not use drugs.    Review of patient's allergies indicates:   Allergen Reactions    Demerol [meperidine] Other (See Comments)     Decreased bp       Medication List with Changes/Refills   Current Medications    ALENDRONATE (FOSAMAX) 70 MG TABLET    TAKE ONE TABLET BY MOUTH EVERY 7 DAYS    ALLOPURINOL (ZYLOPRIM) 300 MG TABLET    Take 300 mg by mouth every morning.     ALPRAZOLAM (XANAX) 0.5 MG TABLET    Take 0.5 mg by mouth 3 (three) times daily as needed.    ASPIRIN (ECOTRIN) 81 MG EC TABLET    Take 81 mg by mouth every evening.     ATORVASTATIN (LIPITOR) 40 MG TABLET    Take 40 mg by mouth every evening.     CYANOCOBALAMIN 1,000 MCG/ML INJECTION        ESCITALOPRAM OXALATE (LEXAPRO) 10 MG TABLET    Take 10 mg by mouth once daily.    GLUCOSAMINE HCL/CHONDR SHIELDS A NA (GLUCOSAMINE-CHONDROITIN) 1,500-1,200 MG/30 ML LIQD    Take by mouth.    HYDROCODONE-ACETAMINOPHEN 10-325MG (NORCO)  MG TAB    Take 1 tablet by mouth every 4 (four) hours as needed.    NITROSTAT 0.4 MG SL TABLET        PIOGLITAZONE (ACTOS) 30 MG TABLET    Take 30 mg by mouth every morning.     SOTALOL (BETAPACE) 120 MG TAB    Take 80 mg by mouth every 12 (twelve) hours.    SPIRONOLACTONE (ALDACTONE) 25 MG TABLET    Take 25 mg by mouth every evening. Takes half nightly    XARELTO 10 MG TAB    TAKE ONE TABLET BY MOUTH EVERY EVENING WITH FOOD    ZOLPIDEM (AMBIEN) 10 MG TAB       Discontinued Medications    COLCHICINE (COLCRYS) 0.6 MG TABLET    Take 1 tablet (0.6 mg total) by mouth once daily.         Objective Findings:    Vital Signs:  BP (!) 184/99   Pulse 74   Ht 5' 10" (1.778 m)   Wt 99.1 kg (218 lb 7.6 oz)   BMI 31.35 kg/m²   Body mass index is 31.35 kg/m².    Physical Exam:  General Appearance: Well appearing in no acute distress  Eyes:    No scleral icterus  ENT:  No lesions or masses   Lungs: CTA bilaterally, no wheezes, no rhonchi, no rales  Heart:  S1, S2 normal, no murmurs " heard  Abdomen:  Ventral diastasis of the rectus muscle Non distended, soft, no guarding, no rebound, mild epigastric tenderness, no appreciated ascites, no bruits, no hepatosplenomegaly,  No CVA tenderness, no appreciated hernias  Musculoskeletal:  No major joint deformities  Skin: No petechiae or rash on exposed skin areas  Neurologic:  Alert and oriented x4  Psychiatric:  Normal speech mentation and affect    Labs:  Lab Results   Component Value Date    WBC 4.98 01/10/2020    HGB 13.0 (L) 01/10/2020    HCT 41.4 01/10/2020     01/10/2020    CHOL 147 07/10/2014    TRIG 116 07/10/2014    HDL 43 07/10/2014    ALT 19 01/10/2020    AST 24 01/10/2020     01/10/2020    K 4.7 01/10/2020     01/10/2020    CREATININE 1.1 01/10/2020    BUN 13 01/10/2020    CO2 29 01/10/2020    TSH 3.175 07/07/2016    INR 1.3 (H) 08/21/2017    HGBA1C 6.8 (H) 10/05/2016               Medical Decision Making:  Lab work today  CT scan abdomen pelvis pancreas mass protocol with contrast talk given  Patient knows to hold is Actos for 2 days after CT scan keep well hydrated  Recommend EGD with EUS for further evaluation  Avoid alcohol  Lab work reviewed slightly anemic      Assessment:  1. Anorexia    2. Diarrhea, unspecified type    3. Loss of appetite    4. Epigastric pain    5. Early satiety    6. Family history of pancreatic cancer    7. Family history of liver cancer    8. Iron deficiency anemia, unspecified iron deficiency anemia type    9. Weight loss, unintentional    10. Obesity (BMI 30-39.9)         Recommendations:  1.  Lab work today  2.  CT scan abdomen and pelvis pancreas mass protocol soon as possible.  3.  EGD with upper EUS for further evaluation of diarrhea epigastric tenderness epigastric pain early satiety weight loss and diarrhea.  4.  Stools  least 2 days apart for diarrhea evaluation  5.  Anemia will get iron sats patient presentation is complicated due to current anticoagulation.  6.  Return to GI  clinic in 4 weeks for follow-up.    Follow up in about 4 weeks (around 12/31/2020).      Order summary:  Orders Placed This Encounter    Clostridium difficile EIA    Stool culture    CT Abdomen Pelvis W Wo Contrast    Hepatic Function Panel    CBC Auto Differential    Ferritin    Basic Metabolic Panel    TISSUE TRANSGLUTAMINASE (TTG), IGA    IgA    Lipase    Hepatitis C Antibody    Hepatitis B Surface Antigen    Hepatitis B Surface Antibody, Qual/Quant    HEPATITIS A ANTIBODY, IGG    TSH    CORTISOL, RANDOM    Protime-INR    Iron and TIBC    Calcitonin    Chromogranin A    Gastrin    Pancreatic Polypeptide    Somatostatin    Vasoactive Intes. Polypep 8150    TRYPTASE    Giardia / Cryptosporidum, EIA    Stool Exam-Ova,Cysts,Parasites    Stool Exam-Ova,Cysts,Parasites    Stool Exam-Ova,Cysts,Parasites    Micropsoridia species, PCR    Cyclospora    Fecal fat, qualitative    Case Request Endoscopy: EGD (ESOPHAGOGASTRODUODENOSCOPY), ULTRASOUND, UPPER GI TRACT, ENDOSCOPIC         Thank you so much for allowing me to participate in the care of Placido Colunga MD

## 2020-12-03 NOTE — LETTER
December 3, 2020      Baylee Patel MD  [address]  [facility] [address]           [facility]  [address]  [facility] [address]  Phone: [phone]  Fax: [phone]          Patient: Placido Howe   MR Number: [id number]   YOB: 1940   Date of Visit: 12/3/2020       Dear Dr. Baylee Patel:    Thank you for referring Placido Howe to me for evaluation. Attached you will find relevant portions of my assessment and plan of care.    If you have questions, please do not hesitate to call me. I look forward to following Placido Howe along with you.    Sincerely,    Iván Colunga MD    Enclosure  CC:  No Recipients    If you would like to receive this communication electronically, please contact [email or web address] or [phone] to request more information on [employer] access.    For providers and/or their staff who would like to refer a patient to [facility], please contact us through our one-stop-shop provider referral line, [facility], at [phone].    If you feel you have received this communication in error or would no longer like to receive these types of communications, please e-mail [email or web address]

## 2020-12-04 LAB
HBV SURFACE AG SERPL QL IA: NEGATIVE
HCV AB SERPL QL IA: NEGATIVE
HEPATITIS A ANTIBODY, IGG: POSITIVE

## 2020-12-06 DIAGNOSIS — R63.4 WEIGHT LOSS: Primary | ICD-10-CM

## 2020-12-06 DIAGNOSIS — K58.9 IRRITABLE BOWEL SYNDROME, UNSPECIFIED TYPE: ICD-10-CM

## 2020-12-06 DIAGNOSIS — D50.9 IRON DEFICIENCY ANEMIA, UNSPECIFIED IRON DEFICIENCY ANEMIA TYPE: ICD-10-CM

## 2020-12-06 RX ORDER — FERROUS SULFATE 325(65) MG
325 TABLET ORAL EVERY 12 HOURS
Qty: 60 TABLET | Refills: 4 | Status: SHIPPED | OUTPATIENT
Start: 2020-12-06 | End: 2021-02-07 | Stop reason: SDUPTHER

## 2020-12-07 ENCOUNTER — TELEPHONE (OUTPATIENT)
Dept: GASTROENTEROLOGY | Facility: CLINIC | Age: 80
End: 2020-12-07

## 2020-12-07 ENCOUNTER — TELEPHONE (OUTPATIENT)
Dept: ENDOSCOPY | Facility: HOSPITAL | Age: 80
End: 2020-12-07

## 2020-12-07 ENCOUNTER — HOSPITAL ENCOUNTER (OUTPATIENT)
Dept: RADIOLOGY | Facility: HOSPITAL | Age: 80
Discharge: HOME | End: 2020-12-07
Attending: INTERNAL MEDICINE
Payer: MEDICARE

## 2020-12-07 DIAGNOSIS — N28.9 RENAL LESION: Primary | ICD-10-CM

## 2020-12-07 DIAGNOSIS — R63.0 LOSS OF APPETITE: ICD-10-CM

## 2020-12-07 DIAGNOSIS — R68.81 EARLY SATIETY: ICD-10-CM

## 2020-12-07 DIAGNOSIS — R10.13 EPIGASTRIC PAIN: ICD-10-CM

## 2020-12-07 DIAGNOSIS — Z80.0 FAMILY HISTORY OF PANCREATIC CANCER: ICD-10-CM

## 2020-12-07 DIAGNOSIS — R63.0 ANOREXIA: ICD-10-CM

## 2020-12-07 DIAGNOSIS — R63.4 WEIGHT LOSS: Primary | ICD-10-CM

## 2020-12-07 DIAGNOSIS — Z80.0 FAMILY HISTORY OF LIVER CANCER: ICD-10-CM

## 2020-12-07 DIAGNOSIS — Z95.828 PRESENCE OF IVC FILTER: Primary | ICD-10-CM

## 2020-12-07 DIAGNOSIS — D50.9 IRON DEFICIENCY ANEMIA, UNSPECIFIED IRON DEFICIENCY ANEMIA TYPE: ICD-10-CM

## 2020-12-07 DIAGNOSIS — R19.7 DIARRHEA, UNSPECIFIED TYPE: ICD-10-CM

## 2020-12-07 LAB
GASTRIN SERPL-MCNC: 633 PG/ML
HBV SURFACE AB SER QL IA: NEGATIVE
HBV SURFACE AB SERPL IA-ACNC: 5 MIU/ML
TTG IGA SER-ACNC: 4 UNITS

## 2020-12-07 PROCEDURE — 74177 CT ABD & PELVIS W/CONTRAST: CPT | Mod: 26,,, | Performed by: RADIOLOGY

## 2020-12-07 PROCEDURE — 25500020 PHARM REV CODE 255: Performed by: INTERNAL MEDICINE

## 2020-12-07 PROCEDURE — 74177 CT ABDOMEN PELVIS WITH CONTRAST: ICD-10-PCS | Mod: 26,,, | Performed by: RADIOLOGY

## 2020-12-07 PROCEDURE — 74177 CT ABD & PELVIS W/CONTRAST: CPT | Mod: TC

## 2020-12-07 RX ADMIN — IOHEXOL 100 ML: 350 INJECTION, SOLUTION INTRAVENOUS at 02:12

## 2020-12-07 NOTE — TELEPHONE ENCOUNTER
Virtual visit scheduled for patient with Dr.Sean Colunga on 1/22/2021 for 5:00.   Confirmation mailed.   Ira

## 2020-12-07 NOTE — TELEPHONE ENCOUNTER
----- Message from Iván Colunga MD sent at 12/7/2020  2:04 PM CST -----  Video visit [facility]  ----- Message -----  From: Galina Little MA  Sent: 12/7/2020   2:00 PM CST  To: Iván Colunga MD    You wanted to see him back in 4 weeks from his last visit. See your note below.   You have nothing available.  Ira  ----- Message -----  From: Iván Colunga MD  Sent: 12/7/2020   1:59 PM CST  To: Galina Little MA    For what on that day?  ----- Message -----  From: Galina Little MA  Sent: 12/7/2020   1:56 PM CST  To: MD Sheila Haley, the week of 1/4/2021 you are at the [facility].  Do you want to add him on one day?   Ira  ----- Message -----  From: Iván Colunga MD  Sent: 12/3/2020   2:54 PM CST  To: Galina Little MA    VERY IMPORTANT:    Ira please schedule patient for lab work today on the 2nd floor orders placed    Please set him up for urgent CT scan abdomen pelvis pancreas mass protocol across the street at the [address] at [facility] here at [facility] [place] orders placed    Please schedule patient for stool studies x4  least 2 days apart 1 of the stools needs to be a solid stool please label that fecal elastase okay for him to turn that in close to home.    Please schedule patient to return to GI clinic appointment with me in 4 weeks for follow-up for weight loss diarrhea and abdominal

## 2020-12-09 ENCOUNTER — TELEPHONE (OUTPATIENT)
Dept: ENDOSCOPY | Facility: HOSPITAL | Age: 80
End: 2020-12-09

## 2020-12-09 DIAGNOSIS — D50.9 IRON DEFICIENCY ANEMIA, UNSPECIFIED IRON DEFICIENCY ANEMIA TYPE: Primary | ICD-10-CM

## 2020-12-09 DIAGNOSIS — R19.7 DIARRHEA, UNSPECIFIED TYPE: Primary | ICD-10-CM

## 2020-12-09 NOTE — TELEPHONE ENCOUNTER
----- Message from Iván Colunga MD sent at 12/7/2020  6:09 PM CST -----  Zarina - please refer Placido to urology clinic for evaluation of his renal lesion seen on his kidney. Referral placed.  Please ask him if he is currently followed by a urologist for this and has seen a vascular surgeon about his IVC filter.    Impression:     1.  The pancreas is within normal limits.  No evidence of pancreatic mass lesion.  Nothing to suggest pancreatitis.     2.  Multiple cysts and additional subcentimeter hypodense lesions within each kidney.  There is at least 1 hypodense lesion within each kidney which demonstrates attenuation measurements on the order of 25-26 Hounsfield units which is slightly greater than water density.  These may represent proteinaceous or complex cysts.  The large solid mass seen at the lower pole of the right kidney on previous examinations is either smaller or may have been resected.     3.  IVC filter again identified with stable position.  The superior tip of the filter and some of the posterior struts appear to extend beyond the wall of the IVC.     4.  Additional findings including pacemaker leads, stable subcentimeter hypodensity at the posterior aspect of the right hepatic lobe, cholecystectomy, splenic granulomas, aortic atherosclerosis, diverticulosis coli, scoliosis and DJD.

## 2020-12-09 NOTE — TELEPHONE ENCOUNTER
----- Message from Iván Colunga MD sent at 12/7/2020  6:06 PM CST -----  Zarina - please refer patient to vascular surgery clinic for IVC evaluation of IVC filter again identified with stable position.  Abdominal pain and The superior tip of the filter and some of the posterior struts appear to extend beyond the wall of the IVC. Referral placed.

## 2020-12-09 NOTE — TELEPHONE ENCOUNTER
----- Message from Iván Colunga MD sent at 12/6/2020  1:37 PM CST -----  Zarina- please tell patient that they are iron deficient and anemic and recommend that they take ferrous sulfate one 325mg pill every 12 hours for next 4 months.    Please order repeat fasting Hemoglobin, Iron/TIBC, and Ferritin in 8 weeks - Orders placed.    Zarina please tell Placido recommend EGD with EUS for further evaluation orders have been placed    Please tell patient recommend repeat colonoscopy in light of his iron deficiency anemia.  Orders placed

## 2020-12-10 ENCOUNTER — PATIENT MESSAGE (OUTPATIENT)
Dept: GASTROENTEROLOGY | Facility: CLINIC | Age: 80
End: 2020-12-10

## 2020-12-10 ENCOUNTER — OFFICE VISIT (OUTPATIENT)
Dept: UROLOGY | Facility: CLINIC | Age: 80
End: 2020-12-10
Payer: MEDICARE

## 2020-12-10 ENCOUNTER — PATIENT MESSAGE (OUTPATIENT)
Dept: VASCULAR SURGERY | Facility: CLINIC | Age: 80
End: 2020-12-10

## 2020-12-10 VITALS
DIASTOLIC BLOOD PRESSURE: 72 MMHG | WEIGHT: 219 LBS | SYSTOLIC BLOOD PRESSURE: 129 MMHG | HEART RATE: 67 BPM | BODY MASS INDEX: 31.35 KG/M2 | HEIGHT: 70 IN

## 2020-12-10 DIAGNOSIS — N28.1 BILATERAL RENAL CYSTS: Primary | ICD-10-CM

## 2020-12-10 DIAGNOSIS — N13.8 BPH WITH URINARY OBSTRUCTION: ICD-10-CM

## 2020-12-10 DIAGNOSIS — N28.1 COMPLEX RENAL CYST: ICD-10-CM

## 2020-12-10 DIAGNOSIS — N28.9 RENAL LESION: ICD-10-CM

## 2020-12-10 DIAGNOSIS — N40.1 BPH WITH URINARY OBSTRUCTION: ICD-10-CM

## 2020-12-10 LAB
BILIRUB SERPL-MCNC: ABNORMAL MG/DL
BLOOD URINE, POC: ABNORMAL
CLARITY, POC UA: CLEAR
COLOR, POC UA: ABNORMAL
GLUCOSE UR QL STRIP: ABNORMAL
KETONES UR QL STRIP: ABNORMAL
LEUKOCYTE ESTERASE URINE, POC: ABNORMAL
NITRITE, POC UA: ABNORMAL
PH, POC UA: 5
PROTEIN, POC: ABNORMAL
SPECIFIC GRAVITY, POC UA: 1.01
UROBILINOGEN, POC UA: NORMAL

## 2020-12-10 PROCEDURE — 99999 PR PBB SHADOW E&M-EST. PATIENT-LVL V: CPT | Mod: PBBFAC,,, | Performed by: NURSE PRACTITIONER

## 2020-12-10 PROCEDURE — 99215 OFFICE O/P EST HI 40 MIN: CPT | Mod: PBBFAC | Performed by: NURSE PRACTITIONER

## 2020-12-10 PROCEDURE — 99999 PR PBB SHADOW E&M-EST. PATIENT-LVL V: ICD-10-PCS | Mod: PBBFAC,,, | Performed by: NURSE PRACTITIONER

## 2020-12-10 PROCEDURE — 99203 OFFICE O/P NEW LOW 30 MIN: CPT | Mod: S$PBB,,, | Performed by: NURSE PRACTITIONER

## 2020-12-10 PROCEDURE — 99203 PR OFFICE/OUTPT VISIT, NEW, LEVL III, 30-44 MIN: ICD-10-PCS | Mod: S$PBB,,, | Performed by: NURSE PRACTITIONER

## 2020-12-10 PROCEDURE — 81002 URINALYSIS NONAUTO W/O SCOPE: CPT | Mod: PBBFAC | Performed by: NURSE PRACTITIONER

## 2020-12-10 NOTE — PROGRESS NOTES
CHIEF COMPLAINT:    Placido Howe is a 79 y.o. male presents today for renal cysts    HISTORY OF PRESENTING ILLINESS:    Placido Howe is a 79 y.o. male is new to Urology.  He is here today due to renal cysts seen on CT scan.  No enhancement;solid masses.   He is currently being worked up in GI for chronic diarrhea.    He has no urinary complaints.   FOS is good for him.  Nocturia 1-2x.       He was a  at [employer]. Lost to  hospitals in [place] in 1969 keeping him from the playoffs. (tackle eligible)        REVIEW OF SYSTEMS:  Review of Systems   Constitutional: Negative.  Negative for chills, diaphoresis and fever.   HENT: Negative for congestion and sore throat.    Eyes: Negative.  Negative for double vision.   Respiratory: Negative.  Negative for cough, shortness of breath and wheezing.    Cardiovascular: Negative.  Negative for chest pain, palpitations and leg swelling.   Gastrointestinal: Positive for diarrhea. Negative for abdominal pain, blood in stool, constipation, nausea and vomiting.   Genitourinary: Negative.  Negative for dysuria, flank pain and hematuria.        He is ok with how he urinates.     Musculoskeletal: Negative.  Negative for back pain, falls and neck pain.   Skin: Negative.  Negative for rash.   Neurological: Negative.  Negative for dizziness and seizures.   Endo/Heme/Allergies: Does not bruise/bleed easily.   Psychiatric/Behavioral: Negative.  Negative for depression. The patient is not nervous/anxious.          PATIENT HISTORY:    Past Medical History:   Diagnosis Date    Alopecia     Anemia     Anxiety     Arthritis     Deep vein thrombosis     Depression     Diabetes mellitus     Hyperlipidemia     Hypertension     Hypertension 4/3/2014    Impaired glucose tolerance 4/3/2014    Obesity (BMI 30-39.9) 4/3/2014    Pulmonary embolism     S/P parathyroidectomy 8/29/2014    Type 2 diabetes mellitus, uncontrolled 4/7/2014    Vitamin B12  deficiency 7/10/2014    Vitamin D deficiency 7/10/2014       Past Surgical History:   Procedure Laterality Date    CARDIAC PACEMAKER PLACEMENT      CARDIAC PACEMAKER PLACEMENT      CARDIAC PACEMAKER PLACEMENT      CARPAL TUNNEL RELEASE  2013    right hand    carpel tunnel      right hand    CATARACT EXTRACTION  2001    left and right eyes    CHOLECYSTECTOMY      IVC FILTER RETRIEVAL      JOINT REPLACEMENT      [place] knees    KNEE ARTHROSCOPY W/ MENISCAL REPAIR      right    THYROID SURGERY  08/2014    TONSILLECTOMY      when pt at 3 y/o       Family History   Problem Relation Age of Onset    Rheum arthritis Mother     Diabetes Mellitus Mother     Alzheimer's disease Mother     Cancer Father     Liver cancer Father 63        Possibly pancreatic cancer mets to the liver    Pancreatic cancer Father 63    Cancer Brother 8        ALL    Leukemia Brother     Breast cancer Sister     Celiac disease Neg Hx     Cirrhosis Neg Hx     Colon cancer Neg Hx     Colon polyps Neg Hx     Esophageal cancer Neg Hx     Stomach cancer Neg Hx     Ulcerative colitis Neg Hx        Social History     Socioeconomic History    Marital status:      Spouse name: Not on file    Number of children: Not on file    Years of education: Not on file    Highest education level: Not on file   Occupational History    Not on file   Social Needs    Financial resource strain: Not on file    Food insecurity     Worry: Not on file     Inability: Not on file    Transportation needs     Medical: Not on file     Non-medical: Not on file   Tobacco Use    Smoking status: Never Smoker    Smokeless tobacco: Never Used    Tobacco comment: 5 cigars a year   Substance and Sexual Activity    Alcohol use: Yes     Comment: SOCIALLY    Drug use: No    Sexual activity: Yes     Partners: Female   Lifestyle    Physical activity     Days per week: Not on file     Minutes per session: Not on file    Stress: Not on file    Relationships    Social connections     Talks on phone: Not on file     Gets together: Not on file     Attends [employer] service: Not on file     Active member of club or organization: Not on file     Attends meetings of clubs or organizations: Not on file     Relationship status: Not on file   Other Topics Concern    Not on file   Social History Narrative    Not on file       Allergies:  Demerol [meperidine]    Medications:    Current Outpatient Medications:     alendronate (FOSAMAX) 70 MG tablet, TAKE ONE TABLET BY MOUTH EVERY 7 DAYS, Disp: 4 tablet, Rfl: 12    allopurinol (ZYLOPRIM) 300 MG tablet, Take 300 mg by mouth every morning. , Disp: , Rfl:     alprazolam (XANAX) 0.5 MG tablet, Take 0.5 mg by mouth 3 (three) times daily as needed., Disp: , Rfl: 1    aspirin (ECOTRIN) 81 MG EC tablet, Take 81 mg by mouth every evening. , Disp: , Rfl:     atorvastatin (LIPITOR) 40 MG tablet, Take 40 mg by mouth every evening. , Disp: , Rfl:     cyanocobalamin 1,000 mcg/mL injection, , Disp: , Rfl: 5    escitalopram oxalate (LEXAPRO) 10 MG tablet, Take 10 mg by mouth once daily., Disp: , Rfl: 0    ferrous sulfate (FEOSOL) 325 mg (65 mg iron) Tab tablet, Take 1 tablet (325 mg total) by mouth every 12 (twelve) hours., Disp: 60 tablet, Rfl: 4    GLUCOSAMINE HCL/CHONDR SHIELDS A NA (GLUCOSAMINE-CHONDROITIN) 1,500-1,200 mg/30 mL Liqd, Take by mouth., Disp: , Rfl:     hydrocodone-acetaminophen 10-325mg (NORCO)  mg Tab, Take 1 tablet by mouth every 4 (four) hours as needed., Disp: 41 tablet, Rfl: 0    NITROSTAT 0.4 mg SL tablet, , Disp: , Rfl: 2    pioglitazone (ACTOS) 30 MG tablet, Take 30 mg by mouth every morning. , Disp: , Rfl:     sotalol (BETAPACE) 120 MG Tab, Take 80 mg by mouth every 12 (twelve) hours., Disp: , Rfl:     spironolactone (ALDACTONE) 25 MG tablet, Take 25 mg by mouth every evening. Takes half nightly, Disp: , Rfl:     XARELTO 10 mg Tab, TAKE ONE TABLET BY MOUTH EVERY EVENING WITH FOOD, Disp:  , Rfl: 2    zolpidem (AMBIEN) 10 mg Tab, , Disp: , Rfl: 2    PHYSICAL EXAMINATION:  Physical Exam  Vitals signs and nursing note reviewed.   Constitutional:       General: He is awake.      Appearance: Normal appearance. He is normal weight.   HENT:      Head: Normocephalic.      Right Ear: External ear normal.      Left Ear: External ear normal.      Nose: Nose normal.   Eyes:      Conjunctiva/sclera: Conjunctivae normal.   Neck:      Musculoskeletal: Normal range of motion.   Cardiovascular:      Rate and Rhythm: Normal rate.   Pulmonary:      Effort: Pulmonary effort is normal. No respiratory distress.   Abdominal:      General: There is no distension.      Tenderness: There is no abdominal tenderness. There is no right CVA tenderness, left CVA tenderness or rebound.   Genitourinary:     Penis: Normal and circumcised. No hypospadias, erythema, tenderness or discharge.       Scrotum/Testes: Normal.         Right: Tenderness or swelling not present.         Left: Tenderness or swelling not present.      Prostate: Enlarged. Not tender and no nodules present.      Rectum: External hemorrhoid present.      Comments: Prostate is ~40gms.     Musculoskeletal: Normal range of motion.   Skin:     General: Skin is warm and dry.   Neurological:      General: No focal deficit present.      Mental Status: He is alert and oriented to person, place, and time.   Psychiatric:         Mood and Affect: Mood normal.         Behavior: Behavior is cooperative.           LABS:      In office UA today was clear of infection and blood.       No results found for: PSA, PSADIAG, PSATOTAL          IMPRESSION:    Encounter Diagnoses   Name Primary?    Bilateral renal cysts Yes    Renal lesion     BPH with urinary obstruction     Complex renal cyst          Assessment:       1. Bilateral renal cysts    2. Renal lesion    3. BPH with urinary obstruction    4. Complex renal cyst        Plan:         I spent 35 minutes with the patient of  which more than half was spent in direct consultation with the patient in regards to our treatment and plan.    Education and recommendations of today's plan of care including home remedies.  We discussed his recent CT scan.  Reviewed previous CT scans in the past. Reassurance.   No solid masses seen.  Will monitor;

## 2020-12-10 NOTE — LETTER
December 10, 2020      Iván Colunga MD  [address]  [facility] [address]           [facility] - Urology 2nd Fl  [address]  [facility] [address]  Phone: [phone]          Patient: Placido Howe   MR Number: [id number]   YOB: 1940   Date of Visit: 12/10/2020       Dear Dr. Iván Colunga:    Thank you for referring Placido Hoew to me for evaluation. Attached you will find relevant portions of my assessment and plan of care.    If you have questions, please do not hesitate to call me. I look forward to following Placido Howe along with you.    Sincerely,    tIa Hernandez, NP    Enclosure  CC:  No Recipients    If you would like to receive this communication electronically, please contact [email or web address] or [phone] to request more information on [employer] access.    For providers and/or their staff who would like to refer a patient to [facility], please contact us through our one-stop-shop provider referral line, [facility], at [phone].    If you feel you have received this communication in error or would no longer like to receive these types of communications, please e-mail [email or web address]

## 2020-12-10 NOTE — PATIENT INSTRUCTIONS
BPH (Enlarged Prostate)  The prostate is a gland at the base of the bladder. As some men get older, the prostate may get bigger in size. This problem is called benign prostatic hyperplasia (BPH). BPH puts pressure on the urethra. This is the tube that carries urine from the bladder to the penis. It may interfere with the flow of urine. It may also keep the bladder from emptying fully.    Symptoms of BPH include trouble starting urination and feeling as though the bladder isnt emptying all the way. It also includes a weak urine stream, dribbling and leaking of urine, and frequent and urgent urination (especially at night). BPH can increase the risk of urinary infections. It can also block off urine flow completely. If this occurs, a thin tube (catheter) may be passed into the bladder to help drain urine.  If symptoms are mild, no treatment may be needed right now. If symptoms are more severe, treatment is likely needed. The goal of treatment is to improve urine flow and reduce symptoms. Treatments can include medicine and procedures. Your healthcare provider will discuss treatment options with you as needed.  Home care  The following guidelines will help you care for yourself at home:  · Urinate as soon as you feel the urge. Don't try to hold your urine.  · Don't limit your fluid intake during the day. Drink 6 to 8 glasses of water or liquids a day. This prevents bacteria from building up in the bladder.  · Avoid drinking fluids after dinner to help reduce urination during the night.  · Avoid medicines that can worsen your symptoms. These include certain cold and allergy medicines and antidepressants. Diuretics used for high blood pressure can also worsen symptoms. Talk to your doctor about the medicines you take. Other choices may work better for you.  Prostate cancer screening  BPH does not increase the risk of prostate cancer. But because prostate cancer is a common cancer in men, screening is sometimes  recommended. This may help detect the cancer in its early stages when treatment is most effective. Factors that can increase the risk of prostate cancer include being -American or having a father or brother who had prostate cancer. A high-fat diet may also increase the risk of prostate cancer. Talk to your healthcare provider to see whether you should be screened for prostate cancer.  Follow-up care  Follow up with your healthcare provider, or as advised  To learn more, go to:  · National Kidney & Urologic Diseases Information Clearinghouse  kidney.niddk.nih.gov, 448.361.9412  When to seek medical advice  Call your healthcare provider right away if any of these occur:  · Fever of 100.4°F (38.0°C) or higher, or as advised  · Unable to pass urine for 8 hours  · Increasing pressure or pain in your bladder (lower abdomen)  · Blood in the urine  · Increasing low back pain, not related to injury  · Symptoms of urinary infection (increased urge to urinate, burning when passing urine, foul-smelling urine)  Date Last Reviewed: 7/1/2016 © 2000-2017 [employer]. [address] [place] [address]. All rights reserved. This information is not intended as a substitute for professional medical care. Always follow your healthcare professional's instructions.        Transrectal Ultrasound and Biopsy    A transrectal ultrasound is an imaging test. It uses sound waves to create pictures of a mans prostate gland. Your prostate gland is in front of your rectum. For this test, a special probe (transducer) is placed directly into your rectum. During the test, tissue samples (a biopsy) may also be taken. The test is done by a specially trained technologist called a sonographer.  Getting ready for your test  · You may be asked to clear your bowel before the test. This may be done by injecting liquid into your rectum (an enema). Or it can be done by drinking a special liquid.  · You may be asked not to eat or  drink anything after midnight the night before the test.  · Tell your health care provider about any medicines, herbs, or supplements you are taking. This includes any over-the-counter medicines such as aspirin or ibuprofen.  · Answer any questions your provider has about your medical history. This will help your provider tailor the test to your health needs.  During your test  · You may be asked to change into a gown. You will then lie on your side on an exam table, with your knees bent.  · The test is done with a hand-held probe. This is a short, slender matias. It has a sterile, disposable cover. It is also greased (lubricated) with some gel. It is then gently placed inside your rectum.  · You will feel pressure from the probe. If you feel pain, let your provider know.  · If a biopsy is taken, it is done using a small probe with a very tiny needle on the end. This needle enters your prostate and removes several tiny samples of tissue. These samples are then sent to a lab to be examined. Any mild pain from the biopsy is usually minor.   After your test  Before leaving, you may need to wait for a short time while the images are reviewed. In most cases, you can go back to your normal routine after the test. If you had a biopsy, you may see some blood in your urine, sperm, or stool for a day or so. This is normal. Your health care provider will let you know when your test results are ready.  In some cases, a diagnosis cant be made from the tissue sample that was taken. If this happens, your provider will talk with you about whether you may need another biopsy. Or you may need a different procedure.  When to call your health care provider  Call your provider if you have:  · Very bloody urine or stool  · A fever lasting 24 to 48 hours  · Any other symptoms that your provider asks you to report, based on your medical condition   Date Last Reviewed: 6/19/2015  © 9941-0100 [employer] [facility]. [address] [place] [address],  [place] [address]. All rights reserved. This information is not intended as a substitute for professional medical care. Always follow your healthcare professional's instructions.

## 2020-12-13 DIAGNOSIS — R63.4 WEIGHT LOSS, UNINTENTIONAL: ICD-10-CM

## 2020-12-13 DIAGNOSIS — R19.7 DIARRHEA, UNSPECIFIED TYPE: ICD-10-CM

## 2020-12-13 DIAGNOSIS — D50.9 IRON DEFICIENCY ANEMIA, UNSPECIFIED IRON DEFICIENCY ANEMIA TYPE: Primary | ICD-10-CM

## 2020-12-13 DIAGNOSIS — E16.4 ELEVATED GASTRIN LEVEL: Primary | ICD-10-CM

## 2020-12-13 RX ORDER — FERROUS SULFATE 325(65) MG
325 TABLET ORAL EVERY 12 HOURS
Qty: 60 TABLET | Refills: 4 | Status: SHIPPED | OUTPATIENT
Start: 2020-12-13 | End: 2021-02-07 | Stop reason: SDUPTHER

## 2020-12-14 ENCOUNTER — TELEPHONE (OUTPATIENT)
Dept: GASTROENTEROLOGY | Facility: CLINIC | Age: 80
End: 2020-12-14

## 2020-12-14 ENCOUNTER — PATIENT MESSAGE (OUTPATIENT)
Dept: GASTROENTEROLOGY | Facility: CLINIC | Age: 80
End: 2020-12-14

## 2020-12-14 ENCOUNTER — TELEPHONE (OUTPATIENT)
Dept: NEUROLOGY | Facility: HOSPITAL | Age: 80
End: 2020-12-14

## 2020-12-14 ENCOUNTER — TELEPHONE (OUTPATIENT)
Dept: VASCULAR SURGERY | Facility: CLINIC | Age: 80
End: 2020-12-14

## 2020-12-14 DIAGNOSIS — I82.4Y3 DEEP VEIN THROMBOSIS (DVT) OF PROXIMAL VEIN OF BOTH LOWER EXTREMITIES, UNSPECIFIED CHRONICITY: ICD-10-CM

## 2020-12-14 DIAGNOSIS — Z95.828 PRESENCE OF IVC FILTER: Primary | ICD-10-CM

## 2020-12-14 DIAGNOSIS — E66.9 OBESITY (BMI 30-39.9): ICD-10-CM

## 2020-12-14 DIAGNOSIS — I74.9 EMBOLISM AND THROMBOSIS OF UNSPECIFIED ARTERY: ICD-10-CM

## 2020-12-14 NOTE — TELEPHONE ENCOUNTER
----- Message from Carmita Wellington MA sent at 12/14/2020 12:13 PM CST -----    ----- Message -----  From: Iván Colunga MD  Sent: 12/13/2020   8:53 AM CST  To: Carmita Wellington MA, Mauricio Brewer MD    Important:    Zarina please schedule patient for referral to neuroendocrine Clinic for further evaluation of his diarrhea weight loss and elevated gastrin level.  Referral placed

## 2020-12-14 NOTE — TELEPHONE ENCOUNTER
----- Message from Carmita Wellington MA sent at 12/14/2020 12:13 PM CST -----    ----- Message -----  From: Iván Colunga MD  Sent: 12/13/2020   9:23 AM CST  To: Carmita Wellington MA, Mauricio Brewer MD    Zarina- please tell patient that they are iron deficient and anemic and recommend that they take ferrous sulfate one 325mg pill every 12 hours for next 4 months.    Please order repeat fasting Hemoglobin, Iron/TIBC, and Ferritin in 8 weeks - Orders placed.

## 2020-12-14 NOTE — TELEPHONE ENCOUNTER
Contacted patient in reference to appt with Dr. Grover for IVC filter removal. Explained to patient that Dr. Grover doesn't see patients for this reason and that his appt will need to be rescheduled with another provider. Asked patient why he needs to have IVC filter removed. Pt states he is not sure why he was referred for this. Notified patient that nurse will send message to Dr. Colunga asking the reason for referral as it is not mentioned in his last visit note with patient and that nurse can cancel today's appt with Dr. Grover since it was scheduled incorrectly. When response is received from Dr. Colunga nurse will contact patient with response and to reschedule with different provider if appropriate. Pt verbalized understanding. Appt cancelled.

## 2020-12-14 NOTE — TELEPHONE ENCOUNTER
----- Message from Corinna Boone sent at 12/14/2020  6:48 AM CST -----  Regarding: NEW REFERRAL  New patient - Who called: Tao    Referral source- MD/patient/Internet/other: Iván Colunga   [facility], Gastroenterology        Dept Address: [facility] [place] [address] [place] [address]      Dept Phone Number: [phone]    Why do you need to be seen?    Elevated gastrin level [E16.4]  Weight loss, unintentional [R63.4]  Diarrhea, unspecified type [R19.7]  Comments: Anorexia. Diagnoses of Diarrhea, unspecified type, Loss of appetite, Epigastric pain, Early satiety, Family history of pancreatic cancer, Family history of liver cancer, Iron deficiency anemia, unspecified iron deficiency anemia type, Weight loss, unintentional  Requested physician:    Patient call back #: [phone]    PMH:  8/29/2014 S/P parathyroidectomy  7/10/2014 Vitamin D deficiency  7/10/2014 Vitamin B12 deficiency  4/7/2014 Type 2 diabetes mellitus, uncontrolled  4/3/2014 Obesity (BMI 30-39.9)  4/3/2014 Impaired glucose tolerance  4/3/2014 Hypertension  Date Unknown Alopecia  Date Unknown Anemia  Date Unknown Anxiety  Date Unknown Arthritis  Date Unknown Deep vein thrombosis  Date Unknown Depression  Date Unknown Diabetes mellitus  Date Unknown Hyperlipidemia  Date Unknown Hypertension  Date Unknown Pulmonary embolism    Surgical History   08/2014 Thyroid surgery  2013 Carpal tunnel release   2001 Cataract extraction   Date Unknown Cardiac pacemaker placement  Date Unknown Cardiac pacemaker placement  Date Unknown Cardiac pacemaker placement  Date Unknown carpel tunnel [Other]   Date Unknown Cholecystectomy  Date Unknown Ivc filter retrieval  Date Unknown Joint replacement   Date Unknown Knee arthroscopy w/ meniscal repair   Date Unknown Tonsillectomy     Medication:   alendronate (FOSAMAX) 70 MG tablet    allopurinol (ZYLOPRIM) 300 MG tablet    alprazolam (XANAX) 0.5 MG tablet    aspirin (ECOTRIN) 81 MG EC  tablet    atorvastatin (LIPITOR) 40 MG tablet    cyanocobalamin 1,000 mcg/mL injection    escitalopram oxalate (LEXAPRO) 10 MG tablet    ferrous sulfate (FEOSOL) 325 mg (65 mg iron) Tab tablet    ferrous sulfate (FEOSOL) 325 mg (65 mg iron) Tab tablet    GLUCOSAMINE HCL/CHONDR SHIELDS A NA (GLUCOSAMINE-CHONDROITIN) 1,500-1,200 mg/30 mL Liqd    hydrocodone-acetaminophen 10-325mg (NORCO)  mg Tab    NITROSTAT 0.4 mg SL tablet    pioglitazone (ACTOS) 30 MG tablet    sotalol (BETAPACE) 120 MG Tab    spironolactone (ALDACTONE) 25 MG tablet    XARELTO 10 mg Tab    zolpidem (AMBIEN) 10 mg Tab     Allergies: Demerol    Records:  CT abd/pelvis , EGD, Colonoscopy, and Recent Treating MD Clinic Note. Labs- Records in Epic

## 2020-12-14 NOTE — TELEPHONE ENCOUNTER
Dr. Colunga  Pt wants to go with your medical advice and wants to proceed  Can you place orders for EGD /colonscopy if that's what needed for pt   And let me know what other test he needs   Please advise

## 2020-12-15 ENCOUNTER — TELEPHONE (OUTPATIENT)
Dept: NEUROLOGY | Facility: HOSPITAL | Age: 80
End: 2020-12-15

## 2020-12-15 NOTE — TELEPHONE ENCOUNTER
----- Message from Corinna Boone sent at 12/14/2020  6:48 AM CST -----  Regarding: NEW REFERRAL  New patient - Who called: aTo    Referral source- MD/patient/Internet/other: Iván Colunga   [facility], Gastroenterology        Dept Address: [facility] [place] [address]      Dept Phone Number: [phone]    Why do you need to be seen?    Elevated gastrin level [E16.4]  Weight loss, unintentional [R63.4]  Diarrhea, unspecified type [R19.7]  Comments: Anorexia. Diagnoses of Diarrhea, unspecified type, Loss of appetite, Epigastric pain, Early satiety, Family history of pancreatic cancer, Family history of liver cancer, Iron deficiency anemia, unspecified iron deficiency anemia type, Weight loss, unintentional  Requested physician:    Patient call back #: [phone]    PMH:  8/29/2014 S/P parathyroidectomy  7/10/2014 Vitamin D deficiency  7/10/2014 Vitamin B12 deficiency  4/7/2014 Type 2 diabetes mellitus, uncontrolled  4/3/2014 Obesity (BMI 30-39.9)  4/3/2014 Impaired glucose tolerance  4/3/2014 Hypertension  Date Unknown Alopecia  Date Unknown Anemia  Date Unknown Anxiety  Date Unknown Arthritis  Date Unknown Deep vein thrombosis  Date Unknown Depression  Date Unknown Diabetes mellitus  Date Unknown Hyperlipidemia  Date Unknown Hypertension  Date Unknown Pulmonary embolism    Surgical History   08/2014 Thyroid surgery  2013 Carpal tunnel release   2001 Cataract extraction   Date Unknown Cardiac pacemaker placement  Date Unknown Cardiac pacemaker placement  Date Unknown Cardiac pacemaker placement  Date Unknown carpel tunnel [Other]   Date Unknown Cholecystectomy  Date Unknown Ivc filter retrieval  Date Unknown Joint replacement   Date Unknown Knee arthroscopy w/ meniscal repair   Date Unknown Tonsillectomy     Medication:   alendronate (FOSAMAX) 70 MG tablet    allopurinol (ZYLOPRIM) 300 MG tablet    alprazolam (XANAX) 0.5 MG tablet    aspirin (ECOTRIN) 81 MG EC  tablet    atorvastatin (LIPITOR) 40 MG tablet    cyanocobalamin 1,000 mcg/mL injection    escitalopram oxalate (LEXAPRO) 10 MG tablet    ferrous sulfate (FEOSOL) 325 mg (65 mg iron) Tab tablet    ferrous sulfate (FEOSOL) 325 mg (65 mg iron) Tab tablet    GLUCOSAMINE HCL/CHONDR SHIELDS A NA (GLUCOSAMINE-CHONDROITIN) 1,500-1,200 mg/30 mL Liqd    hydrocodone-acetaminophen 10-325mg (NORCO)  mg Tab    NITROSTAT 0.4 mg SL tablet    pioglitazone (ACTOS) 30 MG tablet    sotalol (BETAPACE) 120 MG Tab    spironolactone (ALDACTONE) 25 MG tablet    XARELTO 10 mg Tab    zolpidem (AMBIEN) 10 mg Tab     Allergies: Demerol    Records:  CT abd/pelvis , EGD, Colonoscopy, and Recent Treating MD Clinic Note. Labs- Records in Epic

## 2020-12-16 ENCOUNTER — PATIENT MESSAGE (OUTPATIENT)
Dept: GASTROENTEROLOGY | Facility: CLINIC | Age: 80
End: 2020-12-16

## 2020-12-17 ENCOUNTER — PATIENT MESSAGE (OUTPATIENT)
Dept: GASTROENTEROLOGY | Facility: CLINIC | Age: 80
End: 2020-12-17

## 2020-12-17 ENCOUNTER — TELEPHONE (OUTPATIENT)
Dept: VASCULAR SURGERY | Facility: CLINIC | Age: 80
End: 2020-12-17

## 2020-12-17 NOTE — TELEPHONE ENCOUNTER
Contacted patient's wife Ceci to schedule appt with Dr. Grover to evaluate IVC filter. Next day appointment scheduled, wife verified.   ----- Message from TONY Williamson III, MD sent at 12/15/2020  4:51 PM CST -----  He can be seen by any of us (except Dr Grover)  ----- Message -----  From: Zhanna Nolen RN  Sent: 12/15/2020   4:36 PM CST  To: MD Dr. Clay Fofana III,    This patient was originally scheduled with Dr. Grover, but he's not seeing IVC patients. Can you take a look at Dr. Colunga's message below and see if he should be scheduled for a clinic visit with you?    Zhanna  ----- Message -----  From: Iván Colunga MD  Sent: 12/14/2020   1:22 PM CST  To: ELIAS Brantley Sean E., MD   12/7/2020  6:09 PM    Zarina - please refer Placido to a vascular surgeon about his IVC filter.     Impression:   IVC filter again identified with stable position.  The superior tip of the filter and some of the posterior struts appear to extend beyond the wall of the IVC.       ----- Message -----  From: Zhanna Nolen RN  Sent: 12/14/2020  11:45 AM CST  To: MD Dr. Cristine Haley,    Mr. Howe was scheduled for an appt with Dr. Grover for IVC filter replacement consult. However, I don't see anything in your last note stating the reason for this visit. The patient is unsure as to why you referred him. Can you let me know why he should have this removed? Also, his appt will have to be rescheduled with Dr. Williamson as Dr. Grover does not see patients for IVC filter removals or placements.    Zhanna Nolen RN

## 2020-12-18 ENCOUNTER — INITIAL CONSULT (OUTPATIENT)
Dept: VASCULAR SURGERY | Facility: CLINIC | Age: 80
End: 2020-12-18
Payer: MEDICARE

## 2020-12-18 ENCOUNTER — INITIAL CONSULT (OUTPATIENT)
Dept: NEUROLOGY | Facility: HOSPITAL | Age: 80
End: 2020-12-18
Attending: SURGERY
Payer: MEDICARE

## 2020-12-18 VITALS
TEMPERATURE: 98 F | SYSTOLIC BLOOD PRESSURE: 153 MMHG | BODY MASS INDEX: 31.97 KG/M2 | HEART RATE: 79 BPM | DIASTOLIC BLOOD PRESSURE: 83 MMHG | WEIGHT: 223.31 LBS | HEIGHT: 70 IN

## 2020-12-18 VITALS
HEART RATE: 61 BPM | DIASTOLIC BLOOD PRESSURE: 77 MMHG | TEMPERATURE: 98 F | BODY MASS INDEX: 32.07 KG/M2 | WEIGHT: 224 LBS | SYSTOLIC BLOOD PRESSURE: 138 MMHG | HEIGHT: 70 IN

## 2020-12-18 DIAGNOSIS — E16.4 ELEVATED GASTRIN LEVEL: ICD-10-CM

## 2020-12-18 DIAGNOSIS — R63.4 WEIGHT LOSS, UNINTENTIONAL: ICD-10-CM

## 2020-12-18 DIAGNOSIS — R19.7 DIARRHEA, UNSPECIFIED TYPE: ICD-10-CM

## 2020-12-18 DIAGNOSIS — D51.0 PERNICIOUS ANEMIA: ICD-10-CM

## 2020-12-18 DIAGNOSIS — I82.5Z9 CHRONIC DEEP VEIN THROMBOSIS (DVT) OF DISTAL VEIN OF LOWER EXTREMITY, UNSPECIFIED LATERALITY: ICD-10-CM

## 2020-12-18 DIAGNOSIS — E16.4 ABNORMALITY OF SECRETION OF GASTRIN: ICD-10-CM

## 2020-12-18 PROBLEM — I82.509 CHRONIC DEEP VEIN THROMBOSIS (DVT) OF LOWER EXTREMITY: Status: ACTIVE | Noted: 2020-12-18

## 2020-12-18 PROCEDURE — 99215 OFFICE O/P EST HI 40 MIN: CPT | Mod: 27 | Performed by: SURGERY

## 2020-12-18 PROCEDURE — 99214 OFFICE O/P EST MOD 30 MIN: CPT | Mod: PBBFAC | Performed by: SURGERY

## 2020-12-18 PROCEDURE — 99205 PR OFFICE/OUTPT VISIT, NEW, LEVL V, 60-74 MIN: ICD-10-PCS | Mod: S$PBB,,, | Performed by: SURGERY

## 2020-12-18 PROCEDURE — 99999 PR PBB SHADOW E&M-EST. PATIENT-LVL IV: ICD-10-PCS | Mod: PBBFAC,,, | Performed by: SURGERY

## 2020-12-18 PROCEDURE — 99999 PR PBB SHADOW E&M-EST. PATIENT-LVL IV: CPT | Mod: PBBFAC,,, | Performed by: SURGERY

## 2020-12-18 PROCEDURE — 99205 OFFICE O/P NEW HI 60 MIN: CPT | Mod: S$PBB,,, | Performed by: SURGERY

## 2020-12-18 RX ORDER — VILAZODONE HYDROCHLORIDE 40 MG/1
40 TABLET ORAL DAILY
Status: ON HOLD | COMMUNITY
Start: 2020-11-17 | End: 2021-04-13 | Stop reason: HOSPADM

## 2020-12-18 RX ORDER — SILDENAFIL 100 MG/1
100 TABLET, FILM COATED ORAL
COMMUNITY
Start: 2020-09-11 | End: 2024-02-16

## 2020-12-18 RX ORDER — MONTELUKAST SODIUM 4 MG/1
1 TABLET, CHEWABLE ORAL DAILY
Status: ON HOLD | COMMUNITY
Start: 2020-10-19 | End: 2021-04-13 | Stop reason: HOSPADM

## 2020-12-18 NOTE — LETTER
[facility]  [address]  [place] [address]  Phone: [phone]  Fax: [phone] December 18, 2020      Iván Colunga MD  [address]  [facility] [address]    Patient: Plcaido Howe   MR Number: [id number]   YOB: 1940   Date of Visit: 12/18/2020       Dear Dr. Iván Colunga:    Thank you for referring Placido Howe to me for evaluation. Attached you will find relevant portions of my assessment and plan of care.    If you have questions, please do not hesitate to call me. I look forward to following Placido Howe along with you.    Sincerely,    TONY Williamson III, MD  Professor and Chief  Vascular and Endovascular Surgery   for Research and [place]  [facility]    WCS/hcr    Enclosure    CC:  Mauricio Brewer MD

## 2020-12-18 NOTE — PROGRESS NOTES
" NOLANETS:  [facility]  A collaboration between [facility] and [facility]      PATIENT: Placido Howe  MRN: [id number]  DATE: 12/19/2020    Subjective:      Chief Complaint: Consult  for possible gastric neuroendocrine tumor, abdominal pain, diarrhea, weight loss     Overview / HPI: This nice man has been experiencing abdominal pain and diarrhea.  Blood work shows an elevated gastrin level.    Interval History:   Blood work    Vitals: Blood pressure (!) 153/83, pulse 79, temperature 98 °F (36.7 °C), temperature source Oral, height 5' 10" (1.778 m), weight 101.3 kg (223 lb 5.2 oz). --weight loss    ECOG Score: 2 - Symptomatic, <50% confined to bed    Oncologic History:   Oncologic History No cancer diagnosis   Oncologic Treatment    Pathology      Past Medical History:  Past Medical History:   Diagnosis Date    Alopecia     Anemia     Anxiety     Arthritis     Deep vein thrombosis     Depression     Diabetes mellitus     Hyperlipidemia     Hypertension     Hypertension 4/3/2014    Impaired glucose tolerance 4/3/2014    Obesity (BMI 30-39.9) 4/3/2014    Pulmonary embolism     S/P parathyroidectomy 8/29/2014    Type 2 diabetes mellitus, uncontrolled 4/7/2014    Vitamin B12 deficiency 7/10/2014    Vitamin D deficiency 7/10/2014       Past Surgical History:  Past Surgical History:   Procedure Laterality Date    CARDIAC PACEMAKER PLACEMENT      CARDIAC PACEMAKER PLACEMENT      CARDIAC PACEMAKER PLACEMENT      CARPAL TUNNEL RELEASE  2013    right hand    carpel tunnel      right hand    CATARACT EXTRACTION  2001    left and right eyes    CHOLECYSTECTOMY      IVC FILTER RETRIEVAL      JOINT REPLACEMENT      [facility]    KNEE ARTHROSCOPY W/ MENISCAL REPAIR      right    THYROID SURGERY  08/2014    TONSILLECTOMY      when pt at 5 y/o       Family History:  Family History   Problem Relation Age of " Onset    Rheum arthritis Mother     Diabetes Mellitus Mother     Alzheimer's disease Mother     Cancer Father     Liver cancer Father 63        Possibly pancreatic cancer mets to the liver    Pancreatic cancer Father 63    Cancer Brother 8        ALL    Leukemia Brother     Breast cancer Sister     Celiac disease Neg Hx     Cirrhosis Neg Hx     Colon cancer Neg Hx     Colon polyps Neg Hx     Esophageal cancer Neg Hx     Stomach cancer Neg Hx     Ulcerative colitis Neg Hx        Allergies:  Demerol [meperidine]    Medications:  Current Outpatient Medications   Medication Sig    alendronate (FOSAMAX) 70 MG tablet TAKE ONE TABLET BY MOUTH EVERY 7 DAYS    allopurinol (ZYLOPRIM) 300 MG tablet Take 300 mg by mouth every morning.     alprazolam (XANAX) 0.5 MG tablet Take 0.5 mg by mouth 3 (three) times daily as needed.    aspirin (ECOTRIN) 81 MG EC tablet Take 81 mg by mouth every evening.     atorvastatin (LIPITOR) 40 MG tablet Take 40 mg by mouth every evening.     colestipoL (COLESTID) 1 gram Tab Take 1 g by mouth 2 (two) times daily.    cyanocobalamin 1,000 mcg/mL injection     ferrous sulfate (FEOSOL) 325 mg (65 mg iron) Tab tablet Take 1 tablet (325 mg total) by mouth every 12 (twelve) hours.    ferrous sulfate (FEOSOL) 325 mg (65 mg iron) Tab tablet Take 1 tablet (325 mg total) by mouth every 12 (twelve) hours.    hydrocodone-acetaminophen 10-325mg (NORCO)  mg Tab Take 1 tablet by mouth every 4 (four) hours as needed.    NITROSTAT 0.4 mg SL tablet     pioglitazone (ACTOS) 30 MG tablet Take 30 mg by mouth every morning.     sotalol (BETAPACE) 120 MG Tab Take 80 mg by mouth every 12 (twelve) hours.    spironolactone (ALDACTONE) 25 MG tablet Take 25 mg by mouth every evening. Takes half nightly    VIIBRYD 40 mg Tab tablet Take 40 mg by mouth once daily.    XARELTO 10 mg Tab TAKE ONE TABLET BY MOUTH EVERY EVENING WITH FOOD    escitalopram oxalate (LEXAPRO) 10 MG tablet Take 10 mg  by mouth once daily.    GLUCOSAMINE HCL/CHONDR SHIELDS A NA (GLUCOSAMINE-CHONDROITIN) 1,500-1,200 mg/30 mL Liqd Take by mouth.    sildenafiL (VIAGRA) 100 MG tablet Take 100 mg by mouth as needed. as directed.    zolpidem (AMBIEN) 10 mg Tab      No current facility-administered medications for this visit.         Review of Systems   Constitutional: Positive for fatigue. Negative for fever and unexpected weight change.   HENT: Positive for hearing loss. Negative for facial swelling.    Eyes: Negative for photophobia and visual disturbance.   Respiratory: Negative for wheezing and stridor.    Cardiovascular:        Hypertension   Gastrointestinal: Positive for abdominal distention, abdominal pain, diarrhea and nausea.   Endocrine: Negative for cold intolerance.   Genitourinary: Negative for difficulty urinating and dysuria.   Musculoskeletal: Negative for myalgias and neck pain.   Skin: Negative for rash and wound.   Allergic/Immunologic: Negative for immunocompromised state.   Neurological: Negative for tremors and weakness.   Hematological: Bruises/bleeds easily.        Anemia   Psychiatric/Behavioral: Negative.           Objective:      Physical Exam  Constitutional:       General: He is not in acute distress.     Appearance: Normal appearance. He is not ill-appearing, toxic-appearing or diaphoretic.   HENT:      Head: Normocephalic.   Cardiovascular:      Rate and Rhythm: Normal rate.   Pulmonary:      Effort: Pulmonary effort is normal. No respiratory distress.      Breath sounds: No stridor.   Abdominal:      Palpations: Abdomen is soft.   Skin:     General: Skin is warm.   Neurological:      Mental Status: He is alert and oriented to person, place, and time.   Psychiatric:         Mood and Affect: Mood normal.         Behavior: Behavior normal.         Thought Content: Thought content normal.         Judgment: Judgment normal.          Laboratory Data:    Neuroendocrine Labs Latest Ref Rng & Units 12/3/2020    GASTRIN <100 pg/mL 633 (H)     Neuroendocrine Labs Latest Ref Rng & Units 12/3/2020   RBC 4.60 - 6.20 M/uL 3.81 (L)   HGB 14.0 - 18.0 g/dL 11.2 (L)   HCT 40.0 - 54.0 % 36.3 (L)   MCV 82 - 98 fL 95   MCH 27.0 - 31.0 pg 29.4   MCHC 32.0 - 36.0 g/dL 30.9 (L)   RDW 11.5 - 14.5 % 13.9   PLATLETS 150 - 350 K/uL 190   MPV 9.2 - 12.9 fL 10.0   GRAN # 1.8 - 7.7 K/uL 3.2   LYMPH # 1.0 - 4.8 K/uL 1.6   MONO # 0.3 - 1.0 K/uL 0.5   EOS # 0.0 - 0.5 K/uL 0.2   BASO # 0.00 - 0.20 K/uL 0.03   GRAN % 38.0 - 73.0 % 58.9   LYMPH % 18.0 - 48.0 % 29.3   MONO % 4.0 - 15.0 % 8.4   EOS % 0.0 - 8.0 % 2.7   BASO % 0.0 - 1.9 % 0.5   DIFF METHOD  Automated   PT 9.0 - 12.5 sec 11.2   PTT 24.9 - 35.2 sec    INR 0.8 - 1.2 1.0   GLUCOSE 70 - 110 mg/dL 84   BUN 8 - 23 mg/dL 12   CREATININE 0.5 - 1.4 mg/dL 1.1    - 145 mmol/L 144   K 3.5 - 5.1 mmol/L 3.9   CHLORIDE 95 - 110 mmol/L 105   CO2 23 - 29 mmol/L 29   CALCIUM 8.7 - 10.5 mg/dL 8.7   PROTEIN, TOTAL 6.0 - 8.4 g/dL 7.3   PHOSPHORUS 2.7 - 4.5 mg/dL    ALBUMIN 3.5 - 5.2 g/dL 3.9   URIC ACID 3.4 - 7.0 mg/dL    TOTAL BILIRUBIN 0.1 - 1.0 mg/dL 1.9 (H)   DIRECT BILIRUBIN 0.1 - 0.3 mg/dL    ALK PHOSPHATASE 55 - 135 U/L 71   SGOT (AST) 10 - 40 U/L 23   SGPT (ALT) 10 - 44 U/L 12     Scans:   CT Abdomen Pelvis With Contrast  Narrative: EXAMINATION:  CT ABDOMEN PELVIS WITH CONTRAST    CLINICAL HISTORY:  Abdominal pain, weight loss;Family history of dad with pancreatic cancer patient with diarrhea and weight loss for the last year early satiety and anorexia; Epigastric pain    TECHNIQUE:  Low dose axial images, sagittal and coronal reformations were obtained from the lung bases to the pubic symphysis following the IV administration of 100 mL of Omnipaque 350 and the oral administration of water.    COMPARISON:  CT scan dated 11/21/2017 and ultrasound examination dated 11/17/2017    FINDINGS:  Abdomen:    - Lower thorax:Pacemaker leads again identified.  Visualized heart appears normal in size.  No  pericardial effusion.    - Lung bases: Mild scarring/atelectasis at the right base.  No infiltrates and no nodules.    - Liver: Liver is normal in size and overall attenuation.  A small, subcentimeter hypodense lesion is seen at the posterior aspect of the right hepatic lobe.  This is too small for characterization but does appear to been present on the previous study.  No new focal abnormalities are noted.    - Gallbladder: Status post cholecystectomy.    - Bile Ducts: No evidence of intra or extra hepatic biliary ductal dilation.    - Spleen: Calcifications are again seen within the spleen, compatible with old granulomatous disease.  Otherwise, unremarkable.    - Kidneys: Both kidneys concentrate contrast material satisfactorily.  Multiple hypodense lesions are again seen within both kidneys.  Some of these lesions are less than 1 cm in size and are too small for accurate attenuation measurements.  Most of the larger lesions demonstrate attenuation measurements of water density, compatible with simple cysts.  The largest cyst on the right measures approximately 2.2 x 2.7 cm in size.  There is a 2.3 cm hypodense mass at the anterior aspect of the right kidney which demonstrates attenuation measurements on the order of 26 Hounsfield units.  On the left, there is a 2.5 cm hypodense mass at the posterior aspect of the kidney which demonstrates attenuation measurements on the order of 25 Hounsfield units.  These may represent proteinaceous or complex cysts.  There was a larger hypodense mass at the lower pole of the right kidney seen on the previous study which measured approximately 4 cm in size.  This lesion is smaller or no longer present.  No hydronephrosis or hydroureter.  The bladder is decompressed..    - Adrenals: Unremarkable.    - Pancreas: No mass or peripancreatic fat stranding.    - Retroperitoneum:  No significant adenopathy.    - Vascular: Atherosclerotic calcification in the wall of the abdominal aorta  with loss of normal tapering distally.  No abdominal aortic aneurysm.  There appear to be 2 accessory renal arteries present on the left.  There may be a small accessory renal artery on the right.  IVC filter is again identified.  The superior tip of the filter and some of the posterior struts appear to extend beyond the wall of the IVC.  This appears similar to the previous study.    - Abdominal wall:  Unremarkable.    Pelvis:    No pelvic mass, adenopathy, or free fluid.    Bowel/Mesentery:    The stomach is unremarkable.  Diverticulosis coli without evidence of diverticulitis.  No evidence of bowel obstruction or inflammation.    Bones:  Mild scoliosis with moderate hypertrophic degenerative changes.  No acute osseous abnormality and no suspicious lytic or blastic lesion.  Impression: 1.  The pancreas is within normal limits.  No evidence of pancreatic mass lesion.  Nothing to suggest pancreatitis.    2.  Multiple cysts and additional subcentimeter hypodense lesions within each kidney.  There is at least 1 hypodense lesion within each kidney which demonstrates attenuation measurements on the order of 25-26 Hounsfield units which is slightly greater than water density.  These may represent proteinaceous or complex cysts.  The large solid mass seen at the lower pole of the right kidney on previous examinations is either smaller or may have been resected.    3.  IVC filter again identified with stable position.  The superior tip of the filter and some of the posterior struts appear to extend beyond the wall of the IVC.    4.  Additional findings including pacemaker leads, stable subcentimeter hypodensity at the posterior aspect of the right hepatic lobe, cholecystectomy, splenic granulomas, aortic atherosclerosis, diverticulosis coli, scoliosis and DJD.    This report was flagged in Epic as abnormal.    Electronically signed by: Eligio Pederson MD  Date:    12/07/2020  Time:    15:37       Assessment/Impression:          Problem List Items Addressed This Visit     None      Visit Diagnoses     Elevated gastrin level        Weight loss, unintentional        Diarrhea, unspecified type               Plan:       I had a long conversation patient and his family about the features of his case that support the treatment and surveillance recommendations outlined below:  1.  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  I am basing that on the fact that his mother was diagnosed with pernicious anemia.  However an elevated gastrin alone is not enough to make the diagnosis.  We would need endoscopic biopsy material showing atrophy and chronic inflammatory changes, we would rule out H pylori, a gastric pH of 6 or higher and the present of anti parietal cell antibodies in his blood work.  He was placed on B12 injections several years ago.  I reviewed the natural history of atrophic gastritis including the risk of developing neuroendocrine tumors within the stomach.  Pernicious anemia is an autoimmune disorder that destroyed the parietal cells leading to loss of the protective lining of the stomach, gastritis, and anemia due to the loss of intrinsic factor and ulcerations in the bodies ability to absorb B12.  B12 supplements should counter some of the downstream consequences, but ongoing gastritis or progression to erosive gastritis can lead to chronic blood loss and anemia.  2.  An upper endoscopy is the standard surveillance stool for patients with known atrophic gastritis.  EGD can also take biopsies to document the pathologic findings as well as measure gastric pH.  I will send anti parietal cell antibodies.  This diagnosis would be congruent with a gastrin level of 633.  He has not had an upper endoscopy, so this is a priority.  In particular, we need to rule out more sinister causes of anemia in this age group that can include malignancy.  We will try to help him some set up  an upper endoscopy and colonoscopy.    In the meantime I discussed dietary behavior modification that can counter some of the symptoms related to atrophic gastritis.  Small more frequent meals and drinking more liquid with meals will help the stomach empty and decrease the symptoms related to irritation caused by the natural irritants in food.  Pepcid AC can blunt the hyper gastrinemia in some patients and decrease the consequences of elevations in gastrin.  There are handful of reasons we would consider surgical intervention, includin.  Progressive signs and symptoms of gastroparesis despite maximal medical management, worsening a receive gastritis complicated by bleeding, or higher grade gastric carcinoid tumors.    From a diarrhea standpoint, patients with atrophic gastritis can experience diarrhea related to malabsorption of certain types of food.  The small more frequent meals, inclusion of natural binders including rice, potatoes, cheese, and small portions of bread can help.  He can try Imodium every morning, but I would not rely on multiple doses of Imodium per day.    Upper endoscopy with documentation of gastric pH +/- atrophy  He needs a colonoscopy as well to rule out a lower GI source of bleed  NET BW to include antiparietal cell antibodies  Continue B12 supplements  Consider Pepcid AC      Jayne Kaplan MD, MPH, FACS  Professor of Surgery, [facility]  Neuroendocrine Surgery, Hepatic/Pancreatic & General Surgical Oncology  [address]  [place]  [address]  ph. [phone]; [phone]  fax. [phone]

## 2020-12-18 NOTE — PROGRESS NOTES
Placido Howe  12/18/2020    HPI:  Patient is a 80 y.o. 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  The filter was placed approx 30 years ago. He was seen by GI recently for diarrhea of unknown reason.  He has had multiple scopes which do not show anything.  He has a family history of pancreatic cancer.  He was sent for a CT scan which demonstrated an IVC filter with tines extending outside of the IVC.  He is referred for this.  I have reviewed his CT.  The tines do not intersect with the aorta, no pseudoaneurysm or fistula.  There does not appear to be any communication with the bowel.  He says he gets occasional RLQ pain at random times, no exacerbating or alleviating factors, very mild and short lived in nature.    Past Medical History:   Diagnosis Date    Alopecia     Anemia     Anxiety     Arthritis     Deep vein thrombosis     Depression     Diabetes mellitus     Hyperlipidemia     Hypertension     Hypertension 4/3/2014    Impaired glucose tolerance 4/3/2014    Obesity (BMI 30-39.9) 4/3/2014    Pulmonary embolism     S/P parathyroidectomy 8/29/2014    Type 2 diabetes mellitus, uncontrolled 4/7/2014    Vitamin B12 deficiency 7/10/2014    Vitamin D deficiency 7/10/2014     Past Surgical History:   Procedure Laterality Date    CARDIAC PACEMAKER PLACEMENT      CARDIAC PACEMAKER PLACEMENT      CARDIAC PACEMAKER PLACEMENT      CARPAL TUNNEL RELEASE  2013    right hand    carpel tunnel      right hand    CATARACT EXTRACTION  2001    left and right eyes    CHOLECYSTECTOMY      IVC FILTER RETRIEVAL      JOINT REPLACEMENT      [facility]    KNEE ARTHROSCOPY W/ MENISCAL REPAIR      right    THYROID SURGERY  08/2014    TONSILLECTOMY      when pt at 5 y/o     Family History   Problem Relation Age of Onset    Rheum arthritis Mother     Diabetes Mellitus Mother     Alzheimer's disease Mother      Cancer Father     Liver cancer Father 63        Possibly pancreatic cancer mets to the liver    Pancreatic cancer Father 63    Cancer Brother 8        ALL    Leukemia Brother     Breast cancer Sister     Celiac disease Neg Hx     Cirrhosis Neg Hx     Colon cancer Neg Hx     Colon polyps Neg Hx     Esophageal cancer Neg Hx     Stomach cancer Neg Hx     Ulcerative colitis Neg Hx      Social History     Socioeconomic History    Marital status:      Spouse name: Not on file    Number of children: Not on file    Years of education: Not on file    Highest education level: Not on file   Occupational History    Not on file   Social Needs    Financial resource strain: Not on file    Food insecurity     Worry: Not on file     Inability: Not on file    Transportation needs     Medical: Not on file     Non-medical: Not on file   Tobacco Use    Smoking status: Never Smoker    Smokeless tobacco: Never Used    Tobacco comment: 5 cigars a year   Substance and Sexual Activity    Alcohol use: Yes     Comment: SOCIALLY    Drug use: No    Sexual activity: Yes     Partners: Female   Lifestyle    Physical activity     Days per week: Not on file     Minutes per session: Not on file    Stress: Not on file   Relationships    Social connections     Talks on phone: Not on file     Gets together: Not on file     Attends Spiritism service: Not on file     Active member of club or organization: Not on file     Attends meetings of clubs or organizations: Not on file     Relationship status: Not on file   Other Topics Concern    Not on file   Social History Narrative    Not on file       Current Outpatient Medications:     alendronate (FOSAMAX) 70 MG tablet, TAKE ONE TABLET BY MOUTH EVERY 7 DAYS, Disp: 4 tablet, Rfl: 12    allopurinol (ZYLOPRIM) 300 MG tablet, Take 300 mg by mouth every morning. , Disp: , Rfl:     alprazolam (XANAX) 0.5 MG tablet, Take 0.5 mg by mouth 3 (three) times daily as needed.,  Disp: , Rfl: 1    aspirin (ECOTRIN) 81 MG EC tablet, Take 81 mg by mouth every evening. , Disp: , Rfl:     atorvastatin (LIPITOR) 40 MG tablet, Take 40 mg by mouth every evening. , Disp: , Rfl:     colestipoL (COLESTID) 1 gram Tab, Take 1 g by mouth 2 (two) times daily., Disp: , Rfl:     cyanocobalamin 1,000 mcg/mL injection, , Disp: , Rfl: 5    escitalopram oxalate (LEXAPRO) 10 MG tablet, Take 10 mg by mouth once daily., Disp: , Rfl: 0    ferrous sulfate (FEOSOL) 325 mg (65 mg iron) Tab tablet, Take 1 tablet (325 mg total) by mouth every 12 (twelve) hours., Disp: 60 tablet, Rfl: 4    ferrous sulfate (FEOSOL) 325 mg (65 mg iron) Tab tablet, Take 1 tablet (325 mg total) by mouth every 12 (twelve) hours., Disp: 60 tablet, Rfl: 4    GLUCOSAMINE HCL/CHONDR SHIELDS A NA (GLUCOSAMINE-CHONDROITIN) 1,500-1,200 mg/30 mL Liqd, Take by mouth., Disp: , Rfl:     hydrocodone-acetaminophen 10-325mg (NORCO)  mg Tab, Take 1 tablet by mouth every 4 (four) hours as needed., Disp: 41 tablet, Rfl: 0    NITROSTAT 0.4 mg SL tablet, , Disp: , Rfl: 2    pioglitazone (ACTOS) 30 MG tablet, Take 30 mg by mouth every morning. , Disp: , Rfl:     sildenafiL (VIAGRA) 100 MG tablet, Take 100 mg by mouth as needed. as directed., Disp: , Rfl:     sotalol (BETAPACE) 120 MG Tab, Take 80 mg by mouth every 12 (twelve) hours., Disp: , Rfl:     spironolactone (ALDACTONE) 25 MG tablet, Take 25 mg by mouth every evening. Takes half nightly, Disp: , Rfl:     VIIBRYD 40 mg Tab tablet, Take 40 mg by mouth once daily., Disp: , Rfl:     XARELTO 10 mg Tab, TAKE ONE TABLET BY MOUTH EVERY EVENING WITH FOOD, Disp: , Rfl: 2    zolpidem (AMBIEN) 10 mg Tab, , Disp: , Rfl: 2    REVIEW OF SYSTEMS:  General: negative; ENT: negative; Allergy and Immunology: negative; Hematological and Lymphatic: Negative; Endocrine: negative; Respiratory: no cough, shortness of breath, or wheezing; Cardiovascular: no chest pain or dyspnea on exertion; Gastrointestinal: no  abdominal pain/back, Diarreha, or bloody stools; Genito-Urinary: no dysuria, trouble voiding, or hematuria; Musculoskeletal: negative  Neurological: no TIA or stroke symptoms; Psychiatric: no nervousness, anxiety or depression.    PHYSICAL EXAM:   Right Arm BP - Sittin/83 (20 0917)  Left Arm BP - Sittin/77 (20 0917)  Pulse: 61  Temp: 98 °F (36.7 °C)       General appearance: Alert, well-appearing, and in no distress.  Oriented to person, place, and time   Neurological: Normal speech, no focal findings noted; CN II - XII grossly intact   Musculoskeletal: Digits/nail without cyanosis/clubbing.  Normal muscle strength/tone.   Neck: Supple, no significant adenopathy; thyroid is not enlarged   Chest: Clear to auscultation, no wheezes, rales or rhonchi, symmetric air entry, no use of accessory muscles   Cardiac: Normal rate and regular rhythm, S1 and S2 normal   Abdomen: Soft, nontender, nondistended, no palpable mass or organomegaly, no rebound tenderness noted   Extremities: no ulcerations   Vascular Exam: Palpable radial pulses bilaterally         LAB RESULTS:  Lab Results   Component Value Date    K 3.9 2020    K 3.9 2020    K 4.7 01/10/2020    CREATININE 1.1 2020    CREATININE 1.1 2020    CREATININE 1.1 01/10/2020     Lab Results   Component Value Date    WBC 5.50 2020    WBC 4.98 01/10/2020    WBC 5.80 2017    HCT 36.3 (L) 2020    HCT 41.4 01/10/2020    HCT 25.9 (L) 2017     2020     01/10/2020     (L) 2017     Lab Results   Component Value Date    HGBA1C 5.9 (H) 2020    HGBA1C 6.8 (H) 10/05/2016    HGBA1C 6.1 07/10/2014         IMAGING:  EXAMINATION:  CT ABDOMEN PELVIS WITH CONTRAST     CLINICAL HISTORY:  Abdominal pain, weight loss;Family history of dad with pancreatic cancer patient with diarrhea and weight loss for the last year early satiety and anorexia; Epigastric pain     TECHNIQUE:  Low dose axial  images, sagittal and coronal reformations were obtained from the lung bases to the pubic symphysis following the IV administration of 100 mL of Omnipaque 350 and the oral administration of water.     COMPARISON:  CT scan dated 11/21/2017 and ultrasound examination dated 11/17/2017     FINDINGS:  Abdomen:     - Lower thorax:Pacemaker leads again identified.  Visualized heart appears normal in size.  No pericardial effusion.     - Lung bases: Mild scarring/atelectasis at the right base.  No infiltrates and no nodules.     - Liver: Liver is normal in size and overall attenuation.  A small, subcentimeter hypodense lesion is seen at the posterior aspect of the right hepatic lobe.  This is too small for characterization but does appear to been present on the previous study.  No new focal abnormalities are noted.     - Gallbladder: Status post cholecystectomy.     - Bile Ducts: No evidence of intra or extra hepatic biliary ductal dilation.     - Spleen: Calcifications are again seen within the spleen, compatible with old granulomatous disease.  Otherwise, unremarkable.     - Kidneys: Both kidneys concentrate contrast material satisfactorily.  Multiple hypodense lesions are again seen within both kidneys.  Some of these lesions are less than 1 cm in size and are too small for accurate attenuation measurements.  Most of the larger lesions demonstrate attenuation measurements of water density, compatible with simple cysts.  The largest cyst on the right measures approximately 2.2 x 2.7 cm in size.  There is a 2.3 cm hypodense mass at the anterior aspect of the right kidney which demonstrates attenuation measurements on the order of 26 Hounsfield units.  On the left, there is a 2.5 cm hypodense mass at the posterior aspect of the kidney which demonstrates attenuation measurements on the order of 25 Hounsfield units.  These may represent proteinaceous or complex cysts.  There was a larger hypodense mass at the lower pole of the  right kidney seen on the previous study which measured approximately 4 cm in size.  This lesion is smaller or no longer present.  No hydronephrosis or hydroureter.  The bladder is decompressed..     - Adrenals: Unremarkable.     - Pancreas: No mass or peripancreatic fat stranding.     - Retroperitoneum:  No significant adenopathy.     - Vascular: Atherosclerotic calcification in the wall of the abdominal aorta with loss of normal tapering distally.  No abdominal aortic aneurysm.  There appear to be 2 accessory renal arteries present on the left.  There may be a small accessory renal artery on the right.  IVC filter is again identified.  The superior tip of the filter and some of the posterior struts appear to extend beyond the wall of the IVC.  This appears similar to the previous study.     - Abdominal wall:  Unremarkable.     Pelvis:     No pelvic mass, adenopathy, or free fluid.     Bowel/Mesentery:     The stomach is unremarkable.  Diverticulosis coli without evidence of diverticulitis.  No evidence of bowel obstruction or inflammation.     Bones:  Mild scoliosis with moderate hypertrophic degenerative changes.  No acute osseous abnormality and no suspicious lytic or blastic lesion.     Impression:     1.  The pancreas is within normal limits.  No evidence of pancreatic mass lesion.  Nothing to suggest pancreatitis.     2.  Multiple cysts and additional subcentimeter hypodense lesions within each kidney.  There is at least 1 hypodense lesion within each kidney which demonstrates attenuation measurements on the order of 25-26 Hounsfield units which is slightly greater than water density.  These may represent proteinaceous or complex cysts.  The large solid mass seen at the lower pole of the right kidney on previous examinations is either smaller or may have been resected.     3.  IVC filter again identified with stable position.  The superior tip of the filter and some of the posterior struts appear to extend  beyond the wall of the IVC.     4.  Additional findings including pacemaker leads, stable subcentimeter hypodensity at the posterior aspect of the right hepatic lobe, cholecystectomy, splenic granulomas, aortic atherosclerosis, diverticulosis coli, scoliosis and DJD.      IMP/PLAN:  80 y.o. male with IVC filter in place.  This has been in place many years and is non-retrievable by endovascular means.  It appears to be asymptomatic.  Would not recommend removal as this would require a highly morbid open operation    -Return to clinic MELVINA Walton MD  Vascular Fellow PGY6    VASCULAR STAFF    I have personally taken the history and examined this patient and agree with the resident's note as stated above    This IVC filter has been in excess of 30 years, and this by definition is not designed to be retrieved endovascularly.  It must be a original [place] filter.   I have personally reviewed the recent CT and compared it to CT from 2017.  There are no interval changes.  He has very modest penetration of of the IVC with several of the limbs, which are not intruding on any adjacent structures    I would not suggest consideration of removal    JOHNNY Williamson III, MD, FACS  Professor and Chief, Vascular and Endovascular Surgery

## 2020-12-18 NOTE — LETTER
December 19, 2020      Iván Colunga MD  [address]  [facility] [address]           [facility] [address] [facility] [place] [address]  Phone: [phone]  Fax: [phone]          Patient: Placido Howe   MR Number: [id number]   YOB: 1940   Date of Visit: 12/18/2020       Dear Dr. Iván Colunga:    Thank you for referring Placido Howe to me for evaluation. Attached you will find relevant portions of my assessment and plan of care.    If you have questions, please do not hesitate to call me. I look forward to following Placido Howe along with you.    Sincerely,    Jayne Kaplan MD    Enclosure  CC:  No Recipients    If you would like to receive this communication electronically, please contact [email or web address] or [phone] to request more information on [employer] Link access.    For providers and/or their staff who would like to refer a patient to [facility], please contact us through our one-stop-shop provider referral line, [facility], at [phone].    If you feel you have received this communication in error or would no longer like to receive these types of communications, please e-mail [email or web address]

## 2020-12-19 ENCOUNTER — PATIENT MESSAGE (OUTPATIENT)
Dept: GASTROENTEROLOGY | Facility: CLINIC | Age: 80
End: 2020-12-19

## 2020-12-19 PROBLEM — E16.4 ABNORMALITY OF SECRETION OF GASTRIN: Status: ACTIVE | Noted: 2020-12-19

## 2020-12-19 PROBLEM — R63.4 WEIGHT LOSS, UNINTENTIONAL: Status: ACTIVE | Noted: 2020-12-19

## 2020-12-19 PROBLEM — E16.4 ELEVATED GASTRIN LEVEL: Status: ACTIVE | Noted: 2020-12-19

## 2020-12-19 PROBLEM — D51.0 PERNICIOUS ANEMIA: Status: ACTIVE | Noted: 2020-12-19

## 2020-12-19 PROBLEM — R19.7 DIARRHEA: Status: ACTIVE | Noted: 2020-12-19

## 2020-12-20 ENCOUNTER — DOCUMENTATION ONLY (OUTPATIENT)
Dept: GASTROENTEROLOGY | Facility: CLINIC | Age: 80
End: 2020-12-20

## 2020-12-20 ENCOUNTER — PATIENT MESSAGE (OUTPATIENT)
Dept: GASTROENTEROLOGY | Facility: CLINIC | Age: 80
End: 2020-12-20

## 2020-12-20 DIAGNOSIS — R68.81 EARLY SATIETY: ICD-10-CM

## 2020-12-20 DIAGNOSIS — R10.13 EPIGASTRIC ABDOMINAL PAIN: ICD-10-CM

## 2020-12-20 DIAGNOSIS — D50.9 IRON DEFICIENCY ANEMIA, UNSPECIFIED IRON DEFICIENCY ANEMIA TYPE: ICD-10-CM

## 2020-12-20 DIAGNOSIS — R19.7 DIARRHEA, UNSPECIFIED TYPE: ICD-10-CM

## 2020-12-20 DIAGNOSIS — E16.4 ELEVATED GASTRIN LEVEL: Primary | ICD-10-CM

## 2020-12-20 NOTE — PROGRESS NOTES
VERY IMPORTANT:    Zarina please remind patient to schedule his urgent EGD and colonoscopy for further evaluation of his iron deficiency anemia epigastric pain early satiety and diarrhea.  He also needs to schedule his upper EUS of his pancreas.  Orders and referrals have been placed    Thank you    Patient needs to be Off all Proton Pump Inhibitors and H2 blocker medications for 2 (Two) weeks before EGD.  Will need to test for gastric pH day of case.      Nexium (esomeprazole)  Prilosec (omeprazole)   Protonix (pantoprazole)  Prevacid (lansoprazole)  Aciphex (rabeprazole)  Dexilant (dexlansoprazole)    Zegerid   Zantac (ranitidine)  Tagamet (cimetadine)  Axid (nizatidine)   Pepcid (famotidine)    ===View-only below this line===  ----- Message -----  From: Jayne Kaplan MD  Sent: 12/19/2020   9:41 PM CST  To: Iván Colunga MD

## 2020-12-21 ENCOUNTER — TELEPHONE (OUTPATIENT)
Dept: GASTROENTEROLOGY | Facility: CLINIC | Age: 80
End: 2020-12-21

## 2020-12-21 NOTE — TELEPHONE ENCOUNTER
----- Message from Iván Colunga MD sent at 12/21/2020  7:51 AM CST -----  Regarding: RE: Lab Client Services  What is the issue  ----- Message -----  From: Lanre Alcaraz  Sent: 12/20/2020   5:51 PM CST  To: Iván Colunga MD, Cristine BENSON Staff  Subject: Lab Client Services                              Hi my name is Lanre I work in the Lab Client Services. We had an issue with a lab on your patient. If you could please give us a call at [phone] that would be great. ANYONE in my department can help. Thank You.

## 2020-12-22 ENCOUNTER — PATIENT MESSAGE (OUTPATIENT)
Dept: GASTROENTEROLOGY | Facility: CLINIC | Age: 80
End: 2020-12-22

## 2020-12-23 ENCOUNTER — PATIENT MESSAGE (OUTPATIENT)
Dept: GASTROENTEROLOGY | Facility: CLINIC | Age: 80
End: 2020-12-23

## 2020-12-23 ENCOUNTER — TELEPHONE (OUTPATIENT)
Dept: ENDOSCOPY | Facility: HOSPITAL | Age: 80
End: 2020-12-23

## 2020-12-23 NOTE — TELEPHONE ENCOUNTER
Blood thinner hold approval received and scanned into Media tab. MD wants patient to hold 3 days before.  Called patient to schedule. No answer and left number to call back to schedule with whoever he gets on the phone. He is eager to get appt scheduled.

## 2020-12-28 ENCOUNTER — PATIENT MESSAGE (OUTPATIENT)
Dept: ENDOSCOPY | Facility: HOSPITAL | Age: 80
End: 2020-12-28

## 2020-12-28 ENCOUNTER — LAB VISIT (OUTPATIENT)
Dept: LAB | Facility: HOSPITAL | Age: 80
End: 2020-12-28
Attending: INTERNAL MEDICINE
Payer: MEDICARE

## 2020-12-28 ENCOUNTER — CLINICAL SUPPORT (OUTPATIENT)
Dept: INFECTIOUS DISEASES | Facility: CLINIC | Age: 80
End: 2020-12-28
Payer: MEDICARE

## 2020-12-28 ENCOUNTER — TELEPHONE (OUTPATIENT)
Dept: ENDOSCOPY | Facility: HOSPITAL | Age: 80
End: 2020-12-28

## 2020-12-28 DIAGNOSIS — R19.7 DIARRHEA, UNSPECIFIED TYPE: ICD-10-CM

## 2020-12-28 DIAGNOSIS — Z12.11 COLON CANCER SCREENING: Primary | ICD-10-CM

## 2020-12-28 DIAGNOSIS — E16.4 ELEVATED GASTRIN LEVEL: ICD-10-CM

## 2020-12-28 DIAGNOSIS — Z01.818 PRE-OP TESTING: ICD-10-CM

## 2020-12-28 DIAGNOSIS — R63.4 WEIGHT LOSS, UNINTENTIONAL: ICD-10-CM

## 2020-12-28 PROCEDURE — G0010 ADMIN HEPATITIS B VACCINE: HCPCS | Mod: PBBFAC

## 2020-12-28 PROCEDURE — 82941 ASSAY OF GASTRIN: CPT

## 2020-12-28 PROCEDURE — 36415 COLL VENOUS BLD VENIPUNCTURE: CPT

## 2020-12-28 RX ORDER — POLYETHYLENE GLYCOL 3350, SODIUM SULFATE ANHYDROUS, SODIUM BICARBONATE, SODIUM CHLORIDE, POTASSIUM CHLORIDE 236; 22.74; 6.74; 5.86; 2.97 G/4L; G/4L; G/4L; G/4L; G/4L
4 POWDER, FOR SOLUTION ORAL ONCE
Qty: 4000 ML | Refills: 0 | Status: SHIPPED | OUTPATIENT
Start: 2020-12-28 | End: 2020-12-28

## 2020-12-28 NOTE — TELEPHONE ENCOUNTER
Patient scheduled for EGD and colonoscopy on 1/25/21 at 8:45 am. Patient instructed to hold xarelto x3 days prior to procedure per Dr. Brewer, see media dated 12/23/21. Patient instructed to hold all acid reducing medications 2 weeks prior to procedure. Patient verbalized understanding of all instructions.

## 2020-12-31 LAB — GASTRIN SERPL-MCNC: 276 PG/ML

## 2021-01-13 ENCOUNTER — IMMUNIZATION (OUTPATIENT)
Dept: INTERNAL MEDICINE | Facility: CLINIC | Age: 81
End: 2021-01-13
Payer: MEDICARE

## 2021-01-13 DIAGNOSIS — Z23 NEED FOR VACCINATION: ICD-10-CM

## 2021-01-13 PROCEDURE — 91300 COVID-19, MRNA, LNP-S, PF, 30 MCG/0.3 ML DOSE VACCINE: CPT | Mod: PBBFAC | Performed by: FAMILY MEDICINE

## 2021-01-21 ENCOUNTER — PATIENT MESSAGE (OUTPATIENT)
Dept: ENDOSCOPY | Facility: HOSPITAL | Age: 81
End: 2021-01-21

## 2021-01-21 ENCOUNTER — TELEPHONE (OUTPATIENT)
Dept: GASTROENTEROLOGY | Facility: CLINIC | Age: 81
End: 2021-01-21

## 2021-01-24 ENCOUNTER — PATIENT MESSAGE (OUTPATIENT)
Dept: ENDOSCOPY | Facility: HOSPITAL | Age: 81
End: 2021-01-24

## 2021-01-24 ENCOUNTER — ANESTHESIA EVENT (OUTPATIENT)
Dept: ENDOSCOPY | Facility: HOSPITAL | Age: 81
End: 2021-01-24
Payer: MEDICARE

## 2021-01-24 ENCOUNTER — TELEPHONE (OUTPATIENT)
Dept: GASTROENTEROLOGY | Facility: CLINIC | Age: 81
End: 2021-01-24

## 2021-01-24 ENCOUNTER — PATIENT MESSAGE (OUTPATIENT)
Dept: GASTROENTEROLOGY | Facility: CLINIC | Age: 81
End: 2021-01-24

## 2021-01-24 DIAGNOSIS — U07.1 COVID-19 VIRUS DETECTED: ICD-10-CM

## 2021-01-24 DIAGNOSIS — U07.1 COVID-19 VIRUS DETECTED: Primary | ICD-10-CM

## 2021-01-25 ENCOUNTER — ANESTHESIA (OUTPATIENT)
Dept: ENDOSCOPY | Facility: HOSPITAL | Age: 81
End: 2021-01-25
Payer: MEDICARE

## 2021-02-04 ENCOUNTER — LAB VISIT (OUTPATIENT)
Dept: LAB | Facility: HOSPITAL | Age: 81
End: 2021-02-04
Attending: INTERNAL MEDICINE
Payer: MEDICARE

## 2021-02-04 ENCOUNTER — IMMUNIZATION (OUTPATIENT)
Dept: INTERNAL MEDICINE | Facility: CLINIC | Age: 81
End: 2021-02-04
Payer: MEDICARE

## 2021-02-04 DIAGNOSIS — D50.9 IRON DEFICIENCY ANEMIA, UNSPECIFIED IRON DEFICIENCY ANEMIA TYPE: ICD-10-CM

## 2021-02-04 DIAGNOSIS — E16.4 ELEVATED GASTRIN LEVEL: ICD-10-CM

## 2021-02-04 DIAGNOSIS — R63.4 WEIGHT LOSS, UNINTENTIONAL: ICD-10-CM

## 2021-02-04 DIAGNOSIS — R19.7 DIARRHEA, UNSPECIFIED TYPE: ICD-10-CM

## 2021-02-04 DIAGNOSIS — Z23 NEED FOR VACCINATION: Primary | ICD-10-CM

## 2021-02-04 LAB
BASOPHILS # BLD AUTO: 0.03 K/UL (ref 0–0.2)
BASOPHILS NFR BLD: 0.4 % (ref 0–1.9)
CRP SERPL-MCNC: 1.2 MG/L (ref 0–8.2)
DIFFERENTIAL METHOD: ABNORMAL
EOSINOPHIL # BLD AUTO: 0.1 K/UL (ref 0–0.5)
EOSINOPHIL NFR BLD: 0.7 % (ref 0–8)
ERYTHROCYTE [DISTWIDTH] IN BLOOD BY AUTOMATED COUNT: 15.3 % (ref 11.5–14.5)
FERRITIN SERPL-MCNC: 70 NG/ML (ref 20–300)
FERRITIN SERPL-MCNC: 70 NG/ML (ref 20–300)
HCT VFR BLD AUTO: 37.9 % (ref 40–54)
HGB BLD-MCNC: 12.5 G/DL (ref 14–18)
HGB BLD-MCNC: 12.5 G/DL (ref 14–18)
IMM GRANULOCYTES # BLD AUTO: 0.02 K/UL (ref 0–0.04)
IMM GRANULOCYTES NFR BLD AUTO: 0.3 % (ref 0–0.5)
LYMPHOCYTES # BLD AUTO: 1.3 K/UL (ref 1–4.8)
LYMPHOCYTES NFR BLD: 19.2 % (ref 18–48)
MCH RBC QN AUTO: 30.7 PG (ref 27–31)
MCHC RBC AUTO-ENTMCNC: 33 G/DL (ref 32–36)
MCV RBC AUTO: 93 FL (ref 82–98)
MONOCYTES # BLD AUTO: 0.6 K/UL (ref 0.3–1)
MONOCYTES NFR BLD: 8.2 % (ref 4–15)
NEUTROPHILS # BLD AUTO: 4.9 K/UL (ref 1.8–7.7)
NEUTROPHILS NFR BLD: 71.2 % (ref 38–73)
NRBC BLD-RTO: 0 /100 WBC
PLATELET # BLD AUTO: 214 K/UL (ref 150–350)
PMV BLD AUTO: 9.3 FL (ref 9.2–12.9)
PREALB SERPL-MCNC: 22 MG/DL (ref 20–43)
RBC # BLD AUTO: 4.07 M/UL (ref 4.6–6.2)
WBC # BLD AUTO: 6.92 K/UL (ref 3.9–12.7)

## 2021-02-04 PROCEDURE — 85025 COMPLETE CBC W/AUTO DIFF WBC: CPT

## 2021-02-04 PROCEDURE — 36415 COLL VENOUS BLD VENIPUNCTURE: CPT

## 2021-02-04 PROCEDURE — 82941 ASSAY OF GASTRIN: CPT

## 2021-02-04 PROCEDURE — 0002A COVID-19, MRNA, LNP-S, PF, 30 MCG/0.3 ML DOSE VACCINE: CPT | Mod: PBBFAC | Performed by: FAMILY MEDICINE

## 2021-02-04 PROCEDURE — 84134 ASSAY OF PREALBUMIN: CPT

## 2021-02-04 PROCEDURE — 82728 ASSAY OF FERRITIN: CPT

## 2021-02-04 PROCEDURE — 86140 C-REACTIVE PROTEIN: CPT

## 2021-02-04 PROCEDURE — 91300 COVID-19, MRNA, LNP-S, PF, 30 MCG/0.3 ML DOSE VACCINE: CPT | Mod: PBBFAC | Performed by: FAMILY MEDICINE

## 2021-02-04 PROCEDURE — 86256 FLUORESCENT ANTIBODY TITER: CPT

## 2021-02-04 PROCEDURE — 83540 ASSAY OF IRON: CPT

## 2021-02-04 PROCEDURE — 86316 IMMUNOASSAY TUMOR OTHER: CPT

## 2021-02-04 PROCEDURE — 86316 IMMUNOASSAY TUMOR OTHER: CPT | Mod: 91

## 2021-02-05 LAB
IRON SERPL-MCNC: 67 UG/DL (ref 45–160)
IRON SERPL-MCNC: 67 UG/DL (ref 45–160)
SATURATED IRON: 18 % (ref 20–50)
SATURATED IRON: 18 % (ref 20–50)
TOTAL IRON BINDING CAPACITY: 373 UG/DL (ref 250–450)
TOTAL IRON BINDING CAPACITY: 373 UG/DL (ref 250–450)
TRANSFERRIN SERPL-MCNC: 252 MG/DL (ref 200–375)
TRANSFERRIN SERPL-MCNC: 252 MG/DL (ref 200–375)

## 2021-02-07 DIAGNOSIS — D50.9 IRON DEFICIENCY ANEMIA, UNSPECIFIED IRON DEFICIENCY ANEMIA TYPE: Primary | ICD-10-CM

## 2021-02-07 RX ORDER — FERROUS SULFATE 325(65) MG
325 TABLET ORAL EVERY 12 HOURS
Qty: 60 TABLET | Refills: 4 | Status: SHIPPED | OUTPATIENT
Start: 2021-02-07 | End: 2021-03-15

## 2021-02-08 ENCOUNTER — TELEPHONE (OUTPATIENT)
Dept: GASTROENTEROLOGY | Facility: CLINIC | Age: 81
End: 2021-02-08

## 2021-02-08 LAB
CGA SERPL-MCNC: 141 NG/ML (ref 0–103)
GASTRIN SERPL-MCNC: 710 PG/ML

## 2021-02-10 LAB — PCA AB TITR SER IF: ABNORMAL {TITER}

## 2021-02-11 ENCOUNTER — PATIENT MESSAGE (OUTPATIENT)
Dept: GASTROENTEROLOGY | Facility: CLINIC | Age: 81
End: 2021-02-11

## 2021-02-11 LAB — SEROTONIN: <30 NG/ML

## 2021-02-16 ENCOUNTER — TELEPHONE (OUTPATIENT)
Dept: GASTROENTEROLOGY | Facility: CLINIC | Age: 81
End: 2021-02-16

## 2021-02-16 DIAGNOSIS — E16.4 ELEVATED GASTRIN LEVEL: Primary | ICD-10-CM

## 2021-02-17 ENCOUNTER — TELEPHONE (OUTPATIENT)
Dept: GASTROENTEROLOGY | Facility: CLINIC | Age: 81
End: 2021-02-17

## 2021-02-17 DIAGNOSIS — Z12.11 SPECIAL SCREENING FOR MALIGNANT NEOPLASMS, COLON: Primary | ICD-10-CM

## 2021-02-17 LAB — PANCREASTATIN SERPL-MCNC: 97 PG/ML (ref 10–135)

## 2021-02-17 RX ORDER — POLYETHYLENE GLYCOL 3350, SODIUM SULFATE ANHYDROUS, SODIUM BICARBONATE, SODIUM CHLORIDE, POTASSIUM CHLORIDE 236; 22.74; 6.74; 5.86; 2.97 G/4L; G/4L; G/4L; G/4L; G/4L
4 POWDER, FOR SOLUTION ORAL ONCE
Qty: 4000 ML | Refills: 0 | Status: SHIPPED | OUTPATIENT
Start: 2021-02-17 | End: 2021-02-17

## 2021-02-18 ENCOUNTER — TELEPHONE (OUTPATIENT)
Dept: GASTROENTEROLOGY | Facility: CLINIC | Age: 81
End: 2021-02-18

## 2021-02-22 ENCOUNTER — TELEPHONE (OUTPATIENT)
Dept: ENDOSCOPY | Facility: HOSPITAL | Age: 81
End: 2021-02-22

## 2021-02-23 ENCOUNTER — PATIENT MESSAGE (OUTPATIENT)
Dept: RHEUMATOLOGY | Facility: CLINIC | Age: 81
End: 2021-02-23

## 2021-02-24 ENCOUNTER — ANESTHESIA EVENT (OUTPATIENT)
Dept: ENDOSCOPY | Facility: HOSPITAL | Age: 81
End: 2021-02-24
Payer: MEDICARE

## 2021-02-24 ENCOUNTER — HOSPITAL ENCOUNTER (OUTPATIENT)
Facility: HOSPITAL | Age: 81
Discharge: HOME | End: 2021-02-24
Attending: INTERNAL MEDICINE | Admitting: INTERNAL MEDICINE
Payer: MEDICARE

## 2021-02-24 ENCOUNTER — ANESTHESIA (OUTPATIENT)
Dept: ENDOSCOPY | Facility: HOSPITAL | Age: 81
End: 2021-02-24
Payer: MEDICARE

## 2021-02-24 VITALS
HEIGHT: 70 IN | TEMPERATURE: 98 F | DIASTOLIC BLOOD PRESSURE: 62 MMHG | RESPIRATION RATE: 17 BRPM | WEIGHT: 215 LBS | HEART RATE: 60 BPM | SYSTOLIC BLOOD PRESSURE: 126 MMHG | OXYGEN SATURATION: 94 % | BODY MASS INDEX: 30.78 KG/M2

## 2021-02-24 DIAGNOSIS — R63.4 WEIGHT LOSS, UNINTENTIONAL: ICD-10-CM

## 2021-02-24 DIAGNOSIS — E16.4 ELEVATED GASTRIN LEVEL: Primary | ICD-10-CM

## 2021-02-24 LAB — POCT GLUCOSE: 96 MG/DL (ref 70–110)

## 2021-02-24 PROCEDURE — 43259 EGD US EXAM DUODENUM/JEJUNUM: CPT | Performed by: INTERNAL MEDICINE

## 2021-02-24 PROCEDURE — 88305 TISSUE EXAM BY PATHOLOGIST: ICD-10-PCS | Mod: 26,,, | Performed by: PATHOLOGY

## 2021-02-24 PROCEDURE — 27201012 HC FORCEPS, HOT/COLD, DISP: Performed by: INTERNAL MEDICINE

## 2021-02-24 PROCEDURE — 88305 TISSUE EXAM BY PATHOLOGIST: CPT | Mod: 59 | Performed by: PATHOLOGY

## 2021-02-24 PROCEDURE — 43259 EGD US EXAM DUODENUM/JEJUNUM: CPT | Mod: ,,, | Performed by: INTERNAL MEDICINE

## 2021-02-24 PROCEDURE — 37000009 HC ANESTHESIA EA ADD 15 MINS: Performed by: INTERNAL MEDICINE

## 2021-02-24 PROCEDURE — 88342 CHG IMMUNOCYTOCHEMISTRY: ICD-10-PCS | Mod: 26,,, | Performed by: PATHOLOGY

## 2021-02-24 PROCEDURE — 43239 EGD BIOPSY SINGLE/MULTIPLE: CPT | Performed by: INTERNAL MEDICINE

## 2021-02-24 PROCEDURE — 63600175 PHARM REV CODE 636 W HCPCS: Performed by: NURSE ANESTHETIST, CERTIFIED REGISTERED

## 2021-02-24 PROCEDURE — 43239 PR EGD, FLEX, W/BIOPSY, SGL/MULTI: ICD-10-PCS | Mod: 59,,, | Performed by: INTERNAL MEDICINE

## 2021-02-24 PROCEDURE — 43239 EGD BIOPSY SINGLE/MULTIPLE: CPT | Mod: 59,,, | Performed by: INTERNAL MEDICINE

## 2021-02-24 PROCEDURE — 82962 GLUCOSE BLOOD TEST: CPT | Performed by: INTERNAL MEDICINE

## 2021-02-24 PROCEDURE — 43259 PR ENDOSCOPIC ULTRASOUND EXAM: ICD-10-PCS | Mod: ,,, | Performed by: INTERNAL MEDICINE

## 2021-02-24 PROCEDURE — 25000003 PHARM REV CODE 250: Performed by: NURSE ANESTHETIST, CERTIFIED REGISTERED

## 2021-02-24 PROCEDURE — 88342 IMHCHEM/IMCYTCHM 1ST ANTB: CPT | Performed by: PATHOLOGY

## 2021-02-24 PROCEDURE — 37000008 HC ANESTHESIA 1ST 15 MINUTES: Performed by: INTERNAL MEDICINE

## 2021-02-24 PROCEDURE — 88305 TISSUE EXAM BY PATHOLOGIST: CPT | Mod: 26,,, | Performed by: PATHOLOGY

## 2021-02-24 PROCEDURE — 88342 IMHCHEM/IMCYTCHM 1ST ANTB: CPT | Mod: 26,,, | Performed by: PATHOLOGY

## 2021-02-24 RX ORDER — PROPOFOL 10 MG/ML
VIAL (ML) INTRAVENOUS
Status: DISCONTINUED | OUTPATIENT
Start: 2021-02-24 | End: 2021-02-24

## 2021-02-24 RX ORDER — LIDOCAINE HYDROCHLORIDE 20 MG/ML
INJECTION INTRAVENOUS
Status: DISCONTINUED | OUTPATIENT
Start: 2021-02-24 | End: 2021-02-24

## 2021-02-24 RX ORDER — PROPOFOL 10 MG/ML
VIAL (ML) INTRAVENOUS CONTINUOUS PRN
Status: DISCONTINUED | OUTPATIENT
Start: 2021-02-24 | End: 2021-02-24

## 2021-02-24 RX ORDER — ONDANSETRON 2 MG/ML
INJECTION INTRAMUSCULAR; INTRAVENOUS
Status: DISCONTINUED | OUTPATIENT
Start: 2021-02-24 | End: 2021-02-24

## 2021-02-24 RX ADMIN — TOPICAL ANESTHETIC 1 EACH: 200 SPRAY DENTAL; PERIODONTAL at 10:02

## 2021-02-24 RX ADMIN — PROPOFOL 125 MCG/KG/MIN: 10 INJECTION, EMULSION INTRAVENOUS at 10:02

## 2021-02-24 RX ADMIN — PROPOFOL 25 MG: 10 INJECTION, EMULSION INTRAVENOUS at 10:02

## 2021-02-24 RX ADMIN — LIDOCAINE HYDROCHLORIDE 100 MG: 20 INJECTION, SOLUTION INTRAVENOUS at 10:02

## 2021-02-24 RX ADMIN — ONDANSETRON 4 MG: 2 INJECTION, SOLUTION INTRAMUSCULAR; INTRAVENOUS at 10:02

## 2021-03-01 ENCOUNTER — PATIENT MESSAGE (OUTPATIENT)
Dept: ENDOSCOPY | Facility: HOSPITAL | Age: 81
End: 2021-03-01

## 2021-03-01 LAB
FINAL PATHOLOGIC DIAGNOSIS: NORMAL
GROSS: NORMAL
Lab: NORMAL

## 2021-03-02 ENCOUNTER — PATIENT MESSAGE (OUTPATIENT)
Dept: ENDOSCOPY | Facility: HOSPITAL | Age: 81
End: 2021-03-02

## 2021-03-02 DIAGNOSIS — Z12.11 SCREEN FOR COLON CANCER: Primary | ICD-10-CM

## 2021-03-02 RX ORDER — SODIUM, POTASSIUM,MAG SULFATES 17.5-3.13G
1 SOLUTION, RECONSTITUTED, ORAL ORAL DAILY
Qty: 1 KIT | Refills: 0 | Status: SHIPPED | OUTPATIENT
Start: 2021-03-02 | End: 2021-03-04

## 2021-03-04 ENCOUNTER — TELEPHONE (OUTPATIENT)
Dept: GASTROENTEROLOGY | Facility: CLINIC | Age: 81
End: 2021-03-04

## 2021-03-06 ENCOUNTER — PATIENT MESSAGE (OUTPATIENT)
Dept: ENDOSCOPY | Facility: HOSPITAL | Age: 81
End: 2021-03-06

## 2021-03-08 ENCOUNTER — TELEPHONE (OUTPATIENT)
Dept: ENDOSCOPY | Facility: HOSPITAL | Age: 81
End: 2021-03-08

## 2021-03-10 ENCOUNTER — TELEPHONE (OUTPATIENT)
Dept: ENDOSCOPY | Facility: HOSPITAL | Age: 81
End: 2021-03-10

## 2021-03-10 ENCOUNTER — TELEPHONE (OUTPATIENT)
Dept: GASTROENTEROLOGY | Facility: CLINIC | Age: 81
End: 2021-03-10

## 2021-03-10 ENCOUNTER — HOSPITAL ENCOUNTER (OUTPATIENT)
Facility: HOSPITAL | Age: 81
Discharge: HOME | End: 2021-03-10
Attending: INTERNAL MEDICINE | Admitting: INTERNAL MEDICINE
Payer: MEDICARE

## 2021-03-10 VITALS
BODY MASS INDEX: 30.78 KG/M2 | DIASTOLIC BLOOD PRESSURE: 77 MMHG | RESPIRATION RATE: 18 BRPM | TEMPERATURE: 97 F | WEIGHT: 215 LBS | HEIGHT: 70 IN | HEART RATE: 59 BPM | OXYGEN SATURATION: 96 % | SYSTOLIC BLOOD PRESSURE: 167 MMHG

## 2021-03-10 DIAGNOSIS — D50.9 IRON DEFICIENCY ANEMIA, UNSPECIFIED IRON DEFICIENCY ANEMIA TYPE: ICD-10-CM

## 2021-03-10 DIAGNOSIS — R63.4 WEIGHT LOSS: ICD-10-CM

## 2021-03-10 DIAGNOSIS — D50.9 IRON DEFICIENCY ANEMIA: ICD-10-CM

## 2021-03-10 DIAGNOSIS — D50.9 IRON DEFICIENCY ANEMIA, UNSPECIFIED IRON DEFICIENCY ANEMIA TYPE: Primary | ICD-10-CM

## 2021-03-10 DIAGNOSIS — K63.89 COLONIC MASS: Primary | ICD-10-CM

## 2021-03-10 LAB
PH, BODY FLUID: 7
POCT GLUCOSE: 99 MG/DL (ref 70–110)

## 2021-03-10 PROCEDURE — 45381 COLONOSCOPY SUBMUCOUS NJX: CPT | Mod: 51,,, | Performed by: INTERNAL MEDICINE

## 2021-03-10 PROCEDURE — 88341 IMHCHEM/IMCYTCHM EA ADD ANTB: CPT | Mod: 59 | Performed by: PATHOLOGY

## 2021-03-10 PROCEDURE — 27202363 HC INJECTION AGENT, SUBMUCOSAL, ANY: Performed by: INTERNAL MEDICINE

## 2021-03-10 PROCEDURE — 43239 EGD BIOPSY SINGLE/MULTIPLE: CPT | Performed by: INTERNAL MEDICINE

## 2021-03-10 PROCEDURE — 45385 PR COLONOSCOPY,REMV LESN,SNARE: ICD-10-PCS | Mod: ,,, | Performed by: INTERNAL MEDICINE

## 2021-03-10 PROCEDURE — 82962 GLUCOSE BLOOD TEST: CPT | Performed by: INTERNAL MEDICINE

## 2021-03-10 PROCEDURE — 88305 TISSUE EXAM BY PATHOLOGIST: CPT | Mod: 26,,, | Performed by: PATHOLOGY

## 2021-03-10 PROCEDURE — 45385 COLONOSCOPY W/LESION REMOVAL: CPT | Mod: ,,, | Performed by: INTERNAL MEDICINE

## 2021-03-10 PROCEDURE — 45381 PR COLONOSCPY,FLEX,W/DIR SUBMUC INJECT: ICD-10-PCS | Mod: 51,,, | Performed by: INTERNAL MEDICINE

## 2021-03-10 PROCEDURE — 88341 PR IHC OR ICC EACH ADD'L SINGLE ANTIBODY  STAINPR: ICD-10-PCS | Mod: 26,,, | Performed by: PATHOLOGY

## 2021-03-10 PROCEDURE — E9220 PRA ENDO ANESTHESIA: ICD-10-PCS | Mod: ,,, | Performed by: NURSE ANESTHETIST, CERTIFIED REGISTERED

## 2021-03-10 PROCEDURE — 45385 COLONOSCOPY W/LESION REMOVAL: CPT | Performed by: INTERNAL MEDICINE

## 2021-03-10 PROCEDURE — E9220 PRA ENDO ANESTHESIA: HCPCS | Mod: ,,, | Performed by: NURSE ANESTHETIST, CERTIFIED REGISTERED

## 2021-03-10 PROCEDURE — 88305 TISSUE EXAM BY PATHOLOGIST: ICD-10-PCS | Mod: 26,,, | Performed by: PATHOLOGY

## 2021-03-10 PROCEDURE — 88305 TISSUE EXAM BY PATHOLOGIST: CPT | Mod: 59 | Performed by: PATHOLOGY

## 2021-03-10 PROCEDURE — 88342 IMHCHEM/IMCYTCHM 1ST ANTB: CPT | Mod: 91 | Performed by: PATHOLOGY

## 2021-03-10 PROCEDURE — 45380 PR COLONOSCOPY,BIOPSY: ICD-10-PCS | Mod: 59,,, | Performed by: INTERNAL MEDICINE

## 2021-03-10 PROCEDURE — 43239 EGD BIOPSY SINGLE/MULTIPLE: CPT | Mod: 51,,, | Performed by: INTERNAL MEDICINE

## 2021-03-10 PROCEDURE — 25000003 PHARM REV CODE 250: Performed by: NURSE ANESTHETIST, CERTIFIED REGISTERED

## 2021-03-10 PROCEDURE — 45380 COLONOSCOPY AND BIOPSY: CPT | Mod: 59,,, | Performed by: INTERNAL MEDICINE

## 2021-03-10 PROCEDURE — 45381 COLONOSCOPY SUBMUCOUS NJX: CPT | Performed by: INTERNAL MEDICINE

## 2021-03-10 PROCEDURE — 63600175 PHARM REV CODE 636 W HCPCS: Performed by: NURSE ANESTHETIST, CERTIFIED REGISTERED

## 2021-03-10 PROCEDURE — 37000008 HC ANESTHESIA 1ST 15 MINUTES: Performed by: INTERNAL MEDICINE

## 2021-03-10 PROCEDURE — 88342 IMHCHEM/IMCYTCHM 1ST ANTB: CPT | Performed by: PATHOLOGY

## 2021-03-10 PROCEDURE — 45380 COLONOSCOPY AND BIOPSY: CPT | Performed by: INTERNAL MEDICINE

## 2021-03-10 PROCEDURE — 88341 IMHCHEM/IMCYTCHM EA ADD ANTB: CPT | Mod: 26,,, | Performed by: PATHOLOGY

## 2021-03-10 PROCEDURE — 25000003 PHARM REV CODE 250: Performed by: INTERNAL MEDICINE

## 2021-03-10 PROCEDURE — 88342 IMHCHEM/IMCYTCHM 1ST ANTB: CPT | Mod: 26,,, | Performed by: PATHOLOGY

## 2021-03-10 PROCEDURE — 37000009 HC ANESTHESIA EA ADD 15 MINS: Performed by: INTERNAL MEDICINE

## 2021-03-10 PROCEDURE — 27201012 HC FORCEPS, HOT/COLD, DISP: Performed by: INTERNAL MEDICINE

## 2021-03-10 PROCEDURE — 43239 PR EGD, FLEX, W/BIOPSY, SGL/MULTI: ICD-10-PCS | Mod: 51,,, | Performed by: INTERNAL MEDICINE

## 2021-03-10 PROCEDURE — 82657 ENZYME CELL ACTIVITY: CPT | Performed by: PATHOLOGY

## 2021-03-10 PROCEDURE — 88342 CHG IMMUNOCYTOCHEMISTRY: ICD-10-PCS | Mod: 26,,, | Performed by: PATHOLOGY

## 2021-03-10 RX ORDER — PHENYLEPHRINE HYDROCHLORIDE 10 MG/ML
INJECTION INTRAVENOUS
Status: DISCONTINUED | OUTPATIENT
Start: 2021-03-10 | End: 2021-03-10

## 2021-03-10 RX ORDER — PROPOFOL 10 MG/ML
INJECTION, EMULSION INTRAVENOUS
Status: DISCONTINUED | OUTPATIENT
Start: 2021-03-10 | End: 2021-03-10

## 2021-03-10 RX ORDER — SODIUM CHLORIDE 9 MG/ML
INJECTION, SOLUTION INTRAVENOUS CONTINUOUS
Status: DISCONTINUED | OUTPATIENT
Start: 2021-03-10 | End: 2021-03-10 | Stop reason: HOSPADM

## 2021-03-10 RX ORDER — LIDOCAINE HCL/PF 100 MG/5ML
SYRINGE (ML) INTRAVENOUS
Status: DISCONTINUED | OUTPATIENT
Start: 2021-03-10 | End: 2021-03-10

## 2021-03-10 RX ADMIN — PROPOFOL 150 MCG/KG/MIN: 10 INJECTION, EMULSION INTRAVENOUS at 12:03

## 2021-03-10 RX ADMIN — PHENYLEPHRINE HYDROCHLORIDE 100 MCG: 10 INJECTION INTRAVENOUS at 01:03

## 2021-03-10 RX ADMIN — Medication 100 MG: at 12:03

## 2021-03-10 RX ADMIN — SODIUM CHLORIDE: 0.9 INJECTION, SOLUTION INTRAVENOUS at 12:03

## 2021-03-10 RX ADMIN — GLYCOPYRROLATE 0.2 MG: 0.2 INJECTION, SOLUTION INTRAMUSCULAR; INTRAVITREAL at 01:03

## 2021-03-11 ENCOUNTER — TELEPHONE (OUTPATIENT)
Dept: GASTROENTEROLOGY | Facility: CLINIC | Age: 81
End: 2021-03-11

## 2021-03-11 ENCOUNTER — OFFICE VISIT (OUTPATIENT)
Dept: SURGERY | Facility: CLINIC | Age: 81
End: 2021-03-11
Payer: MEDICARE

## 2021-03-11 VITALS — HEIGHT: 70 IN | WEIGHT: 216.69 LBS | BODY MASS INDEX: 31.02 KG/M2

## 2021-03-11 DIAGNOSIS — C18.7 MALIGNANT NEOPLASM OF SIGMOID COLON: Primary | ICD-10-CM

## 2021-03-11 PROCEDURE — 3288F PR FALLS RISK ASSESSMENT DOCUMENTED: ICD-10-PCS | Mod: CPTII,S$GLB,, | Performed by: COLON & RECTAL SURGERY

## 2021-03-11 PROCEDURE — 1126F PR PAIN SEVERITY QUANTIFIED, NO PAIN PRESENT: ICD-10-PCS | Mod: S$GLB,,, | Performed by: COLON & RECTAL SURGERY

## 2021-03-11 PROCEDURE — 1101F PR PT FALLS ASSESS DOC 0-1 FALLS W/OUT INJ PAST YR: ICD-10-PCS | Mod: CPTII,S$GLB,, | Performed by: COLON & RECTAL SURGERY

## 2021-03-11 PROCEDURE — 1126F AMNT PAIN NOTED NONE PRSNT: CPT | Mod: S$GLB,,, | Performed by: COLON & RECTAL SURGERY

## 2021-03-11 PROCEDURE — 99999 PR PBB SHADOW E&M-EST. PATIENT-LVL III: CPT | Mod: PBBFAC,,, | Performed by: COLON & RECTAL SURGERY

## 2021-03-11 PROCEDURE — 1159F PR MEDICATION LIST DOCUMENTED IN MEDICAL RECORD: ICD-10-PCS | Mod: S$GLB,,, | Performed by: COLON & RECTAL SURGERY

## 2021-03-11 PROCEDURE — 1159F MED LIST DOCD IN RCRD: CPT | Mod: S$GLB,,, | Performed by: COLON & RECTAL SURGERY

## 2021-03-11 PROCEDURE — 99205 OFFICE O/P NEW HI 60 MIN: CPT | Mod: S$GLB,,, | Performed by: COLON & RECTAL SURGERY

## 2021-03-11 PROCEDURE — 99205 PR OFFICE/OUTPT VISIT, NEW, LEVL V, 60-74 MIN: ICD-10-PCS | Mod: S$GLB,,, | Performed by: COLON & RECTAL SURGERY

## 2021-03-11 PROCEDURE — 1101F PT FALLS ASSESS-DOCD LE1/YR: CPT | Mod: CPTII,S$GLB,, | Performed by: COLON & RECTAL SURGERY

## 2021-03-11 PROCEDURE — 99999 PR PBB SHADOW E&M-EST. PATIENT-LVL III: ICD-10-PCS | Mod: PBBFAC,,, | Performed by: COLON & RECTAL SURGERY

## 2021-03-11 PROCEDURE — 3288F FALL RISK ASSESSMENT DOCD: CPT | Mod: CPTII,S$GLB,, | Performed by: COLON & RECTAL SURGERY

## 2021-03-12 ENCOUNTER — HOSPITAL ENCOUNTER (OUTPATIENT)
Dept: RADIOLOGY | Facility: HOSPITAL | Age: 81
Discharge: HOME | End: 2021-03-12
Attending: COLON & RECTAL SURGERY
Payer: MEDICARE

## 2021-03-12 DIAGNOSIS — C18.7 MALIGNANT NEOPLASM OF SIGMOID COLON: ICD-10-CM

## 2021-03-12 PROCEDURE — 71260 CT CHEST ABDOMEN PELVIS WITH CONTRAST (XPD): ICD-10-PCS | Mod: 26,,, | Performed by: RADIOLOGY

## 2021-03-12 PROCEDURE — 74177 CT ABD & PELVIS W/CONTRAST: CPT | Mod: 26,,, | Performed by: RADIOLOGY

## 2021-03-12 PROCEDURE — 71260 CT THORAX DX C+: CPT | Mod: TC

## 2021-03-12 PROCEDURE — 71260 CT THORAX DX C+: CPT | Mod: 26,,, | Performed by: RADIOLOGY

## 2021-03-12 PROCEDURE — 74177 CT ABD & PELVIS W/CONTRAST: CPT | Mod: TC

## 2021-03-12 PROCEDURE — 25500020 PHARM REV CODE 255: Performed by: COLON & RECTAL SURGERY

## 2021-03-12 PROCEDURE — 74177 CT CHEST ABDOMEN PELVIS WITH CONTRAST (XPD): ICD-10-PCS | Mod: 26,,, | Performed by: RADIOLOGY

## 2021-03-12 RX ADMIN — IOHEXOL 30 ML: 350 INJECTION, SOLUTION INTRAVENOUS at 11:03

## 2021-03-12 RX ADMIN — IOHEXOL 100 ML: 350 INJECTION, SOLUTION INTRAVENOUS at 11:03

## 2021-03-15 ENCOUNTER — TELEPHONE (OUTPATIENT)
Dept: GASTROENTEROLOGY | Facility: CLINIC | Age: 81
End: 2021-03-15

## 2021-03-15 ENCOUNTER — TELEPHONE (OUTPATIENT)
Dept: SURGERY | Facility: CLINIC | Age: 81
End: 2021-03-15

## 2021-03-15 DIAGNOSIS — D50.9 IRON DEFICIENCY ANEMIA, UNSPECIFIED IRON DEFICIENCY ANEMIA TYPE: Primary | ICD-10-CM

## 2021-03-16 ENCOUNTER — TELEPHONE (OUTPATIENT)
Dept: GASTROENTEROLOGY | Facility: CLINIC | Age: 81
End: 2021-03-16

## 2021-03-17 LAB
COMMENT: NORMAL
FINAL PATHOLOGIC DIAGNOSIS: NORMAL
FINAL PATHOLOGIC DIAGNOSIS: NORMAL
GROSS: NORMAL
GROSS: NORMAL
Lab: NORMAL
Lab: NORMAL
MICROSCOPIC EXAM: NORMAL

## 2021-03-18 ENCOUNTER — PATIENT MESSAGE (OUTPATIENT)
Dept: GASTROENTEROLOGY | Facility: CLINIC | Age: 81
End: 2021-03-18

## 2021-03-18 ENCOUNTER — OFFICE VISIT (OUTPATIENT)
Dept: SURGERY | Facility: CLINIC | Age: 81
End: 2021-03-18
Payer: MEDICARE

## 2021-03-18 VITALS — WEIGHT: 214.31 LBS | BODY MASS INDEX: 30.68 KG/M2 | HEIGHT: 70 IN

## 2021-03-18 DIAGNOSIS — C18.7 MALIGNANT NEOPLASM OF SIGMOID COLON: Primary | ICD-10-CM

## 2021-03-18 PROCEDURE — 1159F MED LIST DOCD IN RCRD: CPT | Mod: S$GLB,,, | Performed by: COLON & RECTAL SURGERY

## 2021-03-18 PROCEDURE — 1125F AMNT PAIN NOTED PAIN PRSNT: CPT | Mod: S$GLB,,, | Performed by: COLON & RECTAL SURGERY

## 2021-03-18 PROCEDURE — 99214 PR OFFICE/OUTPT VISIT, EST, LEVL IV, 30-39 MIN: ICD-10-PCS | Mod: S$GLB,,, | Performed by: COLON & RECTAL SURGERY

## 2021-03-18 PROCEDURE — 1100F PR PT FALLS ASSESS DOC 2+ FALLS/FALL W/INJURY/YR: ICD-10-PCS | Mod: CPTII,S$GLB,, | Performed by: COLON & RECTAL SURGERY

## 2021-03-18 PROCEDURE — 1100F PTFALLS ASSESS-DOCD GE2>/YR: CPT | Mod: CPTII,S$GLB,, | Performed by: COLON & RECTAL SURGERY

## 2021-03-18 PROCEDURE — 99999 PR PBB SHADOW E&M-EST. PATIENT-LVL IV: ICD-10-PCS | Mod: PBBFAC,,, | Performed by: COLON & RECTAL SURGERY

## 2021-03-18 PROCEDURE — 3288F FALL RISK ASSESSMENT DOCD: CPT | Mod: CPTII,S$GLB,, | Performed by: COLON & RECTAL SURGERY

## 2021-03-18 PROCEDURE — 1125F PR PAIN SEVERITY QUANTIFIED, PAIN PRESENT: ICD-10-PCS | Mod: S$GLB,,, | Performed by: COLON & RECTAL SURGERY

## 2021-03-18 PROCEDURE — 3288F PR FALLS RISK ASSESSMENT DOCUMENTED: ICD-10-PCS | Mod: CPTII,S$GLB,, | Performed by: COLON & RECTAL SURGERY

## 2021-03-18 PROCEDURE — 99214 OFFICE O/P EST MOD 30 MIN: CPT | Mod: S$GLB,,, | Performed by: COLON & RECTAL SURGERY

## 2021-03-18 PROCEDURE — 1159F PR MEDICATION LIST DOCUMENTED IN MEDICAL RECORD: ICD-10-PCS | Mod: S$GLB,,, | Performed by: COLON & RECTAL SURGERY

## 2021-03-18 PROCEDURE — 99999 PR PBB SHADOW E&M-EST. PATIENT-LVL IV: CPT | Mod: PBBFAC,,, | Performed by: COLON & RECTAL SURGERY

## 2021-03-18 RX ORDER — METRONIDAZOLE 500 MG/1
500 TABLET ORAL 3 TIMES DAILY
Qty: 3 TABLET | Refills: 0 | Status: SHIPPED | OUTPATIENT
Start: 2021-03-18 | End: 2021-03-19

## 2021-03-18 RX ORDER — POLYETHYLENE GLYCOL 3350 17 G/17G
POWDER, FOR SOLUTION ORAL
Qty: 1 BOTTLE | Refills: 0 | Status: ON HOLD | OUTPATIENT
Start: 2021-03-18 | End: 2021-04-03 | Stop reason: HOSPADM

## 2021-03-18 RX ORDER — NEOMYCIN SULFATE 500 MG/1
TABLET ORAL
Qty: 6 TABLET | Refills: 0 | Status: ON HOLD | OUTPATIENT
Start: 2021-03-18 | End: 2021-04-03 | Stop reason: HOSPADM

## 2021-03-18 RX ORDER — SERTRALINE HYDROCHLORIDE 100 MG/1
100 TABLET, FILM COATED ORAL NIGHTLY
COMMUNITY
Start: 2021-03-03

## 2021-03-19 ENCOUNTER — TELEPHONE (OUTPATIENT)
Dept: GASTROENTEROLOGY | Facility: CLINIC | Age: 81
End: 2021-03-19

## 2021-03-29 ENCOUNTER — TELEPHONE (OUTPATIENT)
Dept: SURGERY | Facility: CLINIC | Age: 81
End: 2021-03-29

## 2021-03-30 ENCOUNTER — TELEPHONE (OUTPATIENT)
Dept: SURGERY | Facility: CLINIC | Age: 81
End: 2021-03-30

## 2021-03-30 RX ORDER — FERROUS SULFATE 325(65) MG
325 TABLET ORAL 2 TIMES DAILY
Status: ON HOLD | COMMUNITY
End: 2021-04-13 | Stop reason: HOSPADM

## 2021-03-31 ENCOUNTER — ANESTHESIA EVENT (OUTPATIENT)
Dept: SURGERY | Facility: HOSPITAL | Age: 81
DRG: 330 | End: 2021-03-31
Payer: MEDICARE

## 2021-03-31 ENCOUNTER — HOSPITAL ENCOUNTER (INPATIENT)
Facility: HOSPITAL | Age: 81
LOS: 3 days | Discharge: HOME | DRG: 330 | End: 2021-04-03
Attending: COLON & RECTAL SURGERY | Admitting: COLON & RECTAL SURGERY
Payer: MEDICARE

## 2021-03-31 ENCOUNTER — DOCUMENTATION ONLY (OUTPATIENT)
Dept: CARDIOLOGY | Facility: HOSPITAL | Age: 81
End: 2021-03-31

## 2021-03-31 ENCOUNTER — ANESTHESIA (OUTPATIENT)
Dept: SURGERY | Facility: HOSPITAL | Age: 81
DRG: 330 | End: 2021-03-31
Payer: MEDICARE

## 2021-03-31 DIAGNOSIS — C18.9 COLON CANCER: Primary | ICD-10-CM

## 2021-03-31 DIAGNOSIS — D50.8 OTHER IRON DEFICIENCY ANEMIA: ICD-10-CM

## 2021-03-31 LAB
ABO + RH BLD: NORMAL
BLD GP AB SCN CELLS X3 SERPL QL: NORMAL
POCT GLUCOSE: 172 MG/DL (ref 70–110)
POCT GLUCOSE: 82 MG/DL (ref 70–110)

## 2021-03-31 PROCEDURE — 63600175 PHARM REV CODE 636 W HCPCS: Performed by: ANESTHESIOLOGY

## 2021-03-31 PROCEDURE — 88305 TISSUE EXAM BY PATHOLOGIST: ICD-10-PCS | Mod: 26,,, | Performed by: PATHOLOGY

## 2021-03-31 PROCEDURE — 37000008 HC ANESTHESIA 1ST 15 MINUTES: Performed by: COLON & RECTAL SURGERY

## 2021-03-31 PROCEDURE — D9220A PRA ANESTHESIA: ICD-10-PCS | Mod: CRNA,,, | Performed by: NURSE ANESTHETIST, CERTIFIED REGISTERED

## 2021-03-31 PROCEDURE — 82962 GLUCOSE BLOOD TEST: CPT | Performed by: COLON & RECTAL SURGERY

## 2021-03-31 PROCEDURE — 71000033 HC RECOVERY, INTIAL HOUR: Performed by: COLON & RECTAL SURGERY

## 2021-03-31 PROCEDURE — 27201423 OPTIME MED/SURG SUP & DEVICES STERILE SUPPLY: Performed by: COLON & RECTAL SURGERY

## 2021-03-31 PROCEDURE — D9220A PRA ANESTHESIA: ICD-10-PCS | Mod: ANES,,, | Performed by: ANESTHESIOLOGY

## 2021-03-31 PROCEDURE — 88307 TISSUE EXAM BY PATHOLOGIST: CPT | Performed by: PATHOLOGY

## 2021-03-31 PROCEDURE — 36620 PR INSERT CATH,ART,PERCUT,SHORTTERM: ICD-10-PCS | Mod: 59,,, | Performed by: ANESTHESIOLOGY

## 2021-03-31 PROCEDURE — 25000003 PHARM REV CODE 250: Performed by: NURSE PRACTITIONER

## 2021-03-31 PROCEDURE — 63600175 PHARM REV CODE 636 W HCPCS: Performed by: SURGERY

## 2021-03-31 PROCEDURE — 63600175 PHARM REV CODE 636 W HCPCS: Performed by: NURSE PRACTITIONER

## 2021-03-31 PROCEDURE — 71000016 HC POSTOP RECOV ADDL HR: Performed by: COLON & RECTAL SURGERY

## 2021-03-31 PROCEDURE — 71000015 HC POSTOP RECOV 1ST HR: Performed by: COLON & RECTAL SURGERY

## 2021-03-31 PROCEDURE — 94799 UNLISTED PULMONARY SVC/PX: CPT

## 2021-03-31 PROCEDURE — 25000003 PHARM REV CODE 250: Performed by: SURGERY

## 2021-03-31 PROCEDURE — 86900 BLOOD TYPING SEROLOGIC ABO: CPT | Performed by: NURSE PRACTITIONER

## 2021-03-31 PROCEDURE — 88307 TISSUE EXAM BY PATHOLOGIST: CPT | Mod: 26,,, | Performed by: PATHOLOGY

## 2021-03-31 PROCEDURE — 36000710: Performed by: COLON & RECTAL SURGERY

## 2021-03-31 PROCEDURE — 37000009 HC ANESTHESIA EA ADD 15 MINS: Performed by: COLON & RECTAL SURGERY

## 2021-03-31 PROCEDURE — S0030 INJECTION, METRONIDAZOLE: HCPCS | Performed by: NURSE PRACTITIONER

## 2021-03-31 PROCEDURE — 44213 PR LAP, SURG MOBIL SPLENIC FL DUR PTL COLECTOMY: ICD-10-PCS | Mod: GC,,, | Performed by: COLON & RECTAL SURGERY

## 2021-03-31 PROCEDURE — D9220A PRA ANESTHESIA: Mod: ANES,,, | Performed by: ANESTHESIOLOGY

## 2021-03-31 PROCEDURE — 36000711: Performed by: COLON & RECTAL SURGERY

## 2021-03-31 PROCEDURE — 99900035 HC TECH TIME PER 15 MIN (STAT)

## 2021-03-31 PROCEDURE — D9220A PRA ANESTHESIA: Mod: CRNA,,, | Performed by: NURSE ANESTHETIST, CERTIFIED REGISTERED

## 2021-03-31 PROCEDURE — 88307 PR  SURG PATH,LEVEL V: ICD-10-PCS | Mod: 26,,, | Performed by: PATHOLOGY

## 2021-03-31 PROCEDURE — 20600001 HC STEP DOWN PRIVATE ROOM

## 2021-03-31 PROCEDURE — 63600175 PHARM REV CODE 636 W HCPCS: Performed by: NURSE ANESTHETIST, CERTIFIED REGISTERED

## 2021-03-31 PROCEDURE — 25000003 PHARM REV CODE 250: Performed by: NURSE ANESTHETIST, CERTIFIED REGISTERED

## 2021-03-31 PROCEDURE — 88305 TISSUE EXAM BY PATHOLOGIST: CPT | Mod: 26,,, | Performed by: PATHOLOGY

## 2021-03-31 PROCEDURE — 88305 TISSUE EXAM BY PATHOLOGIST: CPT | Performed by: PATHOLOGY

## 2021-03-31 PROCEDURE — 36620 INSERTION CATHETER ARTERY: CPT | Mod: 59,,, | Performed by: ANESTHESIOLOGY

## 2021-03-31 PROCEDURE — 44213 LAP MOBIL SPLENIC FL ADD-ON: CPT | Mod: GC,,, | Performed by: COLON & RECTAL SURGERY

## 2021-03-31 PROCEDURE — 44207 PR LAP,SURG,COLECTOMY,W/ANAST: ICD-10-PCS | Mod: GC,,, | Performed by: COLON & RECTAL SURGERY

## 2021-03-31 PROCEDURE — 44207 L COLECTOMY/COLOPROCTOSTOMY: CPT | Mod: GC,,, | Performed by: COLON & RECTAL SURGERY

## 2021-03-31 RX ORDER — ALVIMOPAN 12 MG/1
12 CAPSULE ORAL ONCE
Status: COMPLETED | OUTPATIENT
Start: 2021-03-31 | End: 2021-03-31

## 2021-03-31 RX ORDER — ONDANSETRON 2 MG/ML
4 INJECTION INTRAMUSCULAR; INTRAVENOUS EVERY 6 HOURS PRN
Status: DISCONTINUED | OUTPATIENT
Start: 2021-03-31 | End: 2021-04-03 | Stop reason: HOSPADM

## 2021-03-31 RX ORDER — OXYCODONE HYDROCHLORIDE 5 MG/1
5 TABLET ORAL EVERY 4 HOURS PRN
Status: DISCONTINUED | OUTPATIENT
Start: 2021-03-31 | End: 2021-04-03 | Stop reason: HOSPADM

## 2021-03-31 RX ORDER — LIDOCAINE HYDROCHLORIDE 10 MG/ML
1 INJECTION, SOLUTION EPIDURAL; INFILTRATION; INTRACAUDAL; PERINEURAL
Status: COMPLETED | OUTPATIENT
Start: 2021-03-31 | End: 2021-03-31

## 2021-03-31 RX ORDER — ACETAMINOPHEN 500 MG
1000 TABLET ORAL EVERY 8 HOURS
Status: DISCONTINUED | OUTPATIENT
Start: 2021-04-01 | End: 2021-04-03 | Stop reason: HOSPADM

## 2021-03-31 RX ORDER — TRAMADOL HYDROCHLORIDE 50 MG/1
50 TABLET ORAL EVERY 6 HOURS PRN
Status: DISCONTINUED | OUTPATIENT
Start: 2021-03-31 | End: 2021-04-03 | Stop reason: HOSPADM

## 2021-03-31 RX ORDER — FENTANYL CITRATE 50 UG/ML
INJECTION, SOLUTION INTRAMUSCULAR; INTRAVENOUS
Status: DISCONTINUED | OUTPATIENT
Start: 2021-03-31 | End: 2021-03-31

## 2021-03-31 RX ORDER — METRONIDAZOLE 500 MG/100ML
500 INJECTION, SOLUTION INTRAVENOUS
Status: COMPLETED | OUTPATIENT
Start: 2021-03-31 | End: 2021-03-31

## 2021-03-31 RX ORDER — FENTANYL CITRATE 50 UG/ML
25 INJECTION, SOLUTION INTRAMUSCULAR; INTRAVENOUS EVERY 5 MIN PRN
Status: DISCONTINUED | OUTPATIENT
Start: 2021-03-31 | End: 2021-03-31 | Stop reason: HOSPADM

## 2021-03-31 RX ORDER — OXYCODONE HYDROCHLORIDE 10 MG/1
10 TABLET ORAL EVERY 4 HOURS PRN
Status: DISCONTINUED | OUTPATIENT
Start: 2021-03-31 | End: 2021-04-03 | Stop reason: HOSPADM

## 2021-03-31 RX ORDER — ALLOPURINOL 100 MG/1
300 TABLET ORAL EVERY MORNING
Status: DISCONTINUED | OUTPATIENT
Start: 2021-04-01 | End: 2021-04-03 | Stop reason: HOSPADM

## 2021-03-31 RX ORDER — MUPIROCIN 20 MG/G
1 OINTMENT TOPICAL
Status: COMPLETED | OUTPATIENT
Start: 2021-03-31 | End: 2021-03-31

## 2021-03-31 RX ORDER — PROPOFOL 10 MG/ML
VIAL (ML) INTRAVENOUS
Status: DISCONTINUED | OUTPATIENT
Start: 2021-03-31 | End: 2021-03-31

## 2021-03-31 RX ORDER — ONDANSETRON 2 MG/ML
4 INJECTION INTRAMUSCULAR; INTRAVENOUS ONCE AS NEEDED
Status: DISCONTINUED | OUTPATIENT
Start: 2021-03-31 | End: 2021-03-31 | Stop reason: HOSPADM

## 2021-03-31 RX ORDER — KETAMINE HCL IN 0.9 % NACL 50 MG/5 ML
SYRINGE (ML) INTRAVENOUS
Status: DISCONTINUED | OUTPATIENT
Start: 2021-03-31 | End: 2021-03-31

## 2021-03-31 RX ORDER — SODIUM CHLORIDE 0.9 % (FLUSH) 0.9 %
10 SYRINGE (ML) INJECTION
Status: DISCONTINUED | OUTPATIENT
Start: 2021-03-31 | End: 2021-04-03 | Stop reason: HOSPADM

## 2021-03-31 RX ORDER — GABAPENTIN 300 MG/1
300 CAPSULE ORAL 3 TIMES DAILY
Status: DISCONTINUED | OUTPATIENT
Start: 2021-03-31 | End: 2021-04-03 | Stop reason: HOSPADM

## 2021-03-31 RX ORDER — VASOPRESSIN 20 [USP'U]/ML
INJECTION, SOLUTION INTRAMUSCULAR; SUBCUTANEOUS
Status: DISCONTINUED | OUTPATIENT
Start: 2021-03-31 | End: 2021-03-31

## 2021-03-31 RX ORDER — ENOXAPARIN SODIUM 100 MG/ML
40 INJECTION SUBCUTANEOUS EVERY 24 HOURS
Status: DISCONTINUED | OUTPATIENT
Start: 2021-04-01 | End: 2021-04-02

## 2021-03-31 RX ORDER — ACETAMINOPHEN 10 MG/ML
1000 INJECTION, SOLUTION INTRAVENOUS EVERY 8 HOURS
Status: COMPLETED | OUTPATIENT
Start: 2021-03-31 | End: 2021-04-01

## 2021-03-31 RX ORDER — SODIUM CHLORIDE 9 MG/ML
INJECTION, SOLUTION INTRAVENOUS
Status: COMPLETED | OUTPATIENT
Start: 2021-03-31 | End: 2021-03-31

## 2021-03-31 RX ORDER — SODIUM CHLORIDE 9 MG/ML
INJECTION, SOLUTION INTRAVENOUS CONTINUOUS
Status: DISCONTINUED | OUTPATIENT
Start: 2021-03-31 | End: 2021-04-01

## 2021-03-31 RX ORDER — ALVIMOPAN 12 MG/1
12 CAPSULE ORAL 2 TIMES DAILY
Status: DISCONTINUED | OUTPATIENT
Start: 2021-03-31 | End: 2021-04-03 | Stop reason: HOSPADM

## 2021-03-31 RX ORDER — MUPIROCIN 20 MG/G
OINTMENT TOPICAL 2 TIMES DAILY
Status: DISCONTINUED | OUTPATIENT
Start: 2021-03-31 | End: 2021-04-03 | Stop reason: HOSPADM

## 2021-03-31 RX ORDER — ESCITALOPRAM OXALATE 10 MG/1
10 TABLET ORAL DAILY
Status: DISCONTINUED | OUTPATIENT
Start: 2021-04-01 | End: 2021-04-03 | Stop reason: HOSPADM

## 2021-03-31 RX ORDER — SOTALOL HYDROCHLORIDE 80 MG/1
80 TABLET ORAL EVERY 12 HOURS
Status: DISCONTINUED | OUTPATIENT
Start: 2021-03-31 | End: 2021-04-03 | Stop reason: HOSPADM

## 2021-03-31 RX ORDER — GABAPENTIN 300 MG/1
300 CAPSULE ORAL 3 TIMES DAILY
Status: DISCONTINUED | OUTPATIENT
Start: 2021-03-31 | End: 2021-03-31

## 2021-03-31 RX ORDER — ACETAMINOPHEN 650 MG/20.3ML
975 LIQUID ORAL
Status: COMPLETED | OUTPATIENT
Start: 2021-03-31 | End: 2021-03-31

## 2021-03-31 RX ORDER — LIDOCAINE HYDROCHLORIDE 20 MG/ML
INJECTION, SOLUTION EPIDURAL; INFILTRATION; INTRACAUDAL; PERINEURAL
Status: DISCONTINUED | OUTPATIENT
Start: 2021-03-31 | End: 2021-03-31

## 2021-03-31 RX ORDER — ONDANSETRON 2 MG/ML
INJECTION INTRAMUSCULAR; INTRAVENOUS
Status: DISCONTINUED | OUTPATIENT
Start: 2021-03-31 | End: 2021-03-31

## 2021-03-31 RX ORDER — HEPARIN SODIUM 5000 [USP'U]/ML
5000 INJECTION, SOLUTION INTRAVENOUS; SUBCUTANEOUS EVERY 8 HOURS
Status: COMPLETED | OUTPATIENT
Start: 2021-03-31 | End: 2021-03-31

## 2021-03-31 RX ORDER — ROCURONIUM BROMIDE 10 MG/ML
INJECTION, SOLUTION INTRAVENOUS
Status: DISCONTINUED | OUTPATIENT
Start: 2021-03-31 | End: 2021-03-31

## 2021-03-31 RX ORDER — ALPRAZOLAM 0.5 MG/1
0.5 TABLET ORAL 3 TIMES DAILY PRN
Status: DISCONTINUED | OUTPATIENT
Start: 2021-03-31 | End: 2021-04-03 | Stop reason: HOSPADM

## 2021-03-31 RX ORDER — PHENYLEPHRINE HCL IN 0.9% NACL 1 MG/10 ML
SYRINGE (ML) INTRAVENOUS
Status: DISCONTINUED | OUTPATIENT
Start: 2021-03-31 | End: 2021-03-31

## 2021-03-31 RX ORDER — HYDROMORPHONE HYDROCHLORIDE 1 MG/ML
0.2 INJECTION, SOLUTION INTRAMUSCULAR; INTRAVENOUS; SUBCUTANEOUS EVERY 5 MIN PRN
Status: DISCONTINUED | OUTPATIENT
Start: 2021-03-31 | End: 2021-03-31 | Stop reason: HOSPADM

## 2021-03-31 RX ORDER — METOCLOPRAMIDE HYDROCHLORIDE 5 MG/ML
10 INJECTION INTRAMUSCULAR; INTRAVENOUS EVERY 6 HOURS PRN
Status: DISCONTINUED | OUTPATIENT
Start: 2021-03-31 | End: 2021-04-03 | Stop reason: HOSPADM

## 2021-03-31 RX ORDER — NEOSTIGMINE METHYLSULFATE 0.5 MG/ML
INJECTION, SOLUTION INTRAVENOUS
Status: DISCONTINUED | OUTPATIENT
Start: 2021-03-31 | End: 2021-03-31

## 2021-03-31 RX ORDER — SODIUM CHLORIDE 9 MG/ML
INJECTION, SOLUTION INTRAVENOUS CONTINUOUS
Status: DISCONTINUED | OUTPATIENT
Start: 2021-03-31 | End: 2021-04-02

## 2021-03-31 RX ORDER — TRIPROLIDINE/PSEUDOEPHEDRINE 2.5MG-60MG
600 TABLET ORAL
Status: COMPLETED | OUTPATIENT
Start: 2021-03-31 | End: 2021-03-31

## 2021-03-31 RX ORDER — ATORVASTATIN CALCIUM 20 MG/1
40 TABLET, FILM COATED ORAL NIGHTLY
Status: DISCONTINUED | OUTPATIENT
Start: 2021-03-31 | End: 2021-04-03 | Stop reason: HOSPADM

## 2021-03-31 RX ORDER — GABAPENTIN 300 MG/1
300 CAPSULE ORAL
Status: COMPLETED | OUTPATIENT
Start: 2021-03-31 | End: 2021-03-31

## 2021-03-31 RX ORDER — DIPHENHYDRAMINE HYDROCHLORIDE 50 MG/ML
25 INJECTION INTRAMUSCULAR; INTRAVENOUS EVERY 6 HOURS PRN
Status: DISCONTINUED | OUTPATIENT
Start: 2021-03-31 | End: 2021-04-03 | Stop reason: HOSPADM

## 2021-03-31 RX ADMIN — SODIUM CHLORIDE 0.6 MCG/KG/MIN: 9 INJECTION, SOLUTION INTRAVENOUS at 01:03

## 2021-03-31 RX ADMIN — FENTANYL CITRATE 100 MCG: 50 INJECTION INTRAMUSCULAR; INTRAVENOUS at 12:03

## 2021-03-31 RX ADMIN — ACETAMINOPHEN 1000 MG: 10 INJECTION, SOLUTION INTRAVENOUS at 11:03

## 2021-03-31 RX ADMIN — VASOPRESSIN 1 UNITS: 20 INJECTION INTRAVENOUS at 02:03

## 2021-03-31 RX ADMIN — PROPOFOL 50 MG: 10 INJECTION, EMULSION INTRAVENOUS at 02:03

## 2021-03-31 RX ADMIN — VASOPRESSIN 1 UNITS: 20 INJECTION INTRAVENOUS at 01:03

## 2021-03-31 RX ADMIN — PROPOFOL 200 MG: 10 INJECTION, EMULSION INTRAVENOUS at 12:03

## 2021-03-31 RX ADMIN — Medication 100 MCG: at 01:03

## 2021-03-31 RX ADMIN — CEFTRIAXONE SODIUM 2 G: 2 INJECTION, SOLUTION INTRAVENOUS at 12:03

## 2021-03-31 RX ADMIN — Medication 100 MCG: at 02:03

## 2021-03-31 RX ADMIN — NEOSTIGMINE METHYLSULFATE 5 MG: 0.5 INJECTION INTRAVENOUS at 03:03

## 2021-03-31 RX ADMIN — SODIUM CHLORIDE: 0.9 INJECTION, SOLUTION INTRAVENOUS at 12:03

## 2021-03-31 RX ADMIN — FENTANYL CITRATE 25 MCG: 50 INJECTION INTRAMUSCULAR; INTRAVENOUS at 05:03

## 2021-03-31 RX ADMIN — ROCURONIUM BROMIDE 10 MG: 10 INJECTION, SOLUTION INTRAVENOUS at 02:03

## 2021-03-31 RX ADMIN — Medication 30 MG: at 12:03

## 2021-03-31 RX ADMIN — Medication 200 MCG: at 12:03

## 2021-03-31 RX ADMIN — ACETAMINOPHEN 976.6 MG: 160 SOLUTION ORAL at 10:03

## 2021-03-31 RX ADMIN — ROCURONIUM BROMIDE 50 MG: 10 INJECTION, SOLUTION INTRAVENOUS at 12:03

## 2021-03-31 RX ADMIN — ALVIMOPAN 12 MG: 12 CAPSULE ORAL at 11:03

## 2021-03-31 RX ADMIN — IBUPROFEN 600 MG: 100 SUSPENSION ORAL at 10:03

## 2021-03-31 RX ADMIN — METRONIDAZOLE 500 MG: 500 SOLUTION INTRAVENOUS at 01:03

## 2021-03-31 RX ADMIN — GABAPENTIN 300 MG: 300 CAPSULE ORAL at 08:03

## 2021-03-31 RX ADMIN — SODIUM CHLORIDE: 0.9 INJECTION, SOLUTION INTRAVENOUS at 05:03

## 2021-03-31 RX ADMIN — VASOPRESSIN 2 UNITS: 20 INJECTION INTRAVENOUS at 01:03

## 2021-03-31 RX ADMIN — MUPIROCIN: 20 OINTMENT TOPICAL at 11:03

## 2021-03-31 RX ADMIN — HEPARIN SODIUM 5000 UNITS: 5000 INJECTION INTRAVENOUS; SUBCUTANEOUS at 10:03

## 2021-03-31 RX ADMIN — ATORVASTATIN CALCIUM 40 MG: 20 TABLET, FILM COATED ORAL at 08:03

## 2021-03-31 RX ADMIN — MUPIROCIN 1 G: 20 OINTMENT TOPICAL at 10:03

## 2021-03-31 RX ADMIN — ALVIMOPAN 12 MG: 12 CAPSULE ORAL at 10:03

## 2021-03-31 RX ADMIN — OXYCODONE HYDROCHLORIDE 10 MG: 10 TABLET ORAL at 11:03

## 2021-03-31 RX ADMIN — LIDOCAINE HYDROCHLORIDE 10 MG: 10 INJECTION, SOLUTION EPIDURAL; INFILTRATION; INTRACAUDAL; PERINEURAL at 10:03

## 2021-03-31 RX ADMIN — ONDANSETRON 4 MG: 2 INJECTION, SOLUTION INTRAMUSCULAR; INTRAVENOUS at 03:03

## 2021-03-31 RX ADMIN — ROCURONIUM BROMIDE 10 MG: 10 INJECTION, SOLUTION INTRAVENOUS at 01:03

## 2021-03-31 RX ADMIN — SODIUM CHLORIDE: 0.9 INJECTION, SOLUTION INTRAVENOUS at 10:03

## 2021-03-31 RX ADMIN — GLYCOPYRROLATE 0.4 MG: 0.2 INJECTION, SOLUTION INTRAMUSCULAR; INTRAVITREAL at 03:03

## 2021-03-31 RX ADMIN — LIDOCAINE HYDROCHLORIDE 100 MG: 20 INJECTION, SOLUTION EPIDURAL; INFILTRATION; INTRACAUDAL at 12:03

## 2021-03-31 RX ADMIN — GABAPENTIN 300 MG: 300 CAPSULE ORAL at 10:03

## 2021-04-01 LAB
ANION GAP SERPL CALC-SCNC: 12 MMOL/L (ref 8–16)
BASOPHILS # BLD AUTO: 0.01 K/UL (ref 0–0.2)
BASOPHILS NFR BLD: 0.1 % (ref 0–1.9)
BUN SERPL-MCNC: 14 MG/DL (ref 8–23)
CALCIUM SERPL-MCNC: 7.8 MG/DL (ref 8.7–10.5)
CHLORIDE SERPL-SCNC: 100 MMOL/L (ref 95–110)
CO2 SERPL-SCNC: 20 MMOL/L (ref 23–29)
CREAT SERPL-MCNC: 0.9 MG/DL (ref 0.5–1.4)
DIFFERENTIAL METHOD: ABNORMAL
EOSINOPHIL # BLD AUTO: 0 K/UL (ref 0–0.5)
EOSINOPHIL NFR BLD: 0 % (ref 0–8)
ERYTHROCYTE [DISTWIDTH] IN BLOOD BY AUTOMATED COUNT: 15.3 % (ref 11.5–14.5)
EST. GFR  (AFRICAN AMERICAN): >60 ML/MIN/1.73 M^2
EST. GFR  (NON AFRICAN AMERICAN): >60 ML/MIN/1.73 M^2
ESTIMATED AVG GLUCOSE: 120 MG/DL (ref 68–131)
GLUCOSE SERPL-MCNC: 126 MG/DL (ref 70–110)
HBA1C MFR BLD: 5.8 % (ref 4–5.6)
HCT VFR BLD AUTO: 32.8 % (ref 40–54)
HGB BLD-MCNC: 10.8 G/DL (ref 14–18)
IMM GRANULOCYTES # BLD AUTO: 0.03 K/UL (ref 0–0.04)
IMM GRANULOCYTES NFR BLD AUTO: 0.3 % (ref 0–0.5)
LYMPHOCYTES # BLD AUTO: 0.8 K/UL (ref 1–4.8)
LYMPHOCYTES NFR BLD: 7.8 % (ref 18–48)
MAGNESIUM SERPL-MCNC: 1.3 MG/DL (ref 1.6–2.6)
MCH RBC QN AUTO: 30.7 PG (ref 27–31)
MCHC RBC AUTO-ENTMCNC: 32.9 G/DL (ref 32–36)
MCV RBC AUTO: 93 FL (ref 82–98)
MONOCYTES # BLD AUTO: 0.4 K/UL (ref 0.3–1)
MONOCYTES NFR BLD: 3.5 % (ref 4–15)
NEUTROPHILS # BLD AUTO: 9 K/UL (ref 1.8–7.7)
NEUTROPHILS NFR BLD: 88.3 % (ref 38–73)
NRBC BLD-RTO: 0 /100 WBC
PHOSPHATE SERPL-MCNC: 3.7 MG/DL (ref 2.7–4.5)
PLATELET # BLD AUTO: 147 K/UL (ref 150–450)
PMV BLD AUTO: 9.6 FL (ref 9.2–12.9)
POCT GLUCOSE: 121 MG/DL (ref 70–110)
POCT GLUCOSE: 124 MG/DL (ref 70–110)
POCT GLUCOSE: 129 MG/DL (ref 70–110)
POTASSIUM SERPL-SCNC: 4.8 MMOL/L (ref 3.5–5.1)
RBC # BLD AUTO: 3.52 M/UL (ref 4.6–6.2)
SODIUM SERPL-SCNC: 132 MMOL/L (ref 136–145)
WBC # BLD AUTO: 10.16 K/UL (ref 3.9–12.7)

## 2021-04-01 PROCEDURE — 94761 N-INVAS EAR/PLS OXIMETRY MLT: CPT

## 2021-04-01 PROCEDURE — 84100 ASSAY OF PHOSPHORUS: CPT | Performed by: SURGERY

## 2021-04-01 PROCEDURE — 97161 PT EVAL LOW COMPLEX 20 MIN: CPT

## 2021-04-01 PROCEDURE — 63600175 PHARM REV CODE 636 W HCPCS: Performed by: SURGERY

## 2021-04-01 PROCEDURE — 97165 OT EVAL LOW COMPLEX 30 MIN: CPT

## 2021-04-01 PROCEDURE — 85025 COMPLETE CBC W/AUTO DIFF WBC: CPT | Performed by: SURGERY

## 2021-04-01 PROCEDURE — 36415 COLL VENOUS BLD VENIPUNCTURE: CPT | Performed by: STUDENT IN AN ORGANIZED HEALTH CARE EDUCATION/TRAINING PROGRAM

## 2021-04-01 PROCEDURE — 25000003 PHARM REV CODE 250: Performed by: SURGERY

## 2021-04-01 PROCEDURE — 63600175 PHARM REV CODE 636 W HCPCS: Performed by: STUDENT IN AN ORGANIZED HEALTH CARE EDUCATION/TRAINING PROGRAM

## 2021-04-01 PROCEDURE — 83735 ASSAY OF MAGNESIUM: CPT | Performed by: SURGERY

## 2021-04-01 PROCEDURE — 83036 HEMOGLOBIN GLYCOSYLATED A1C: CPT | Performed by: STUDENT IN AN ORGANIZED HEALTH CARE EDUCATION/TRAINING PROGRAM

## 2021-04-01 PROCEDURE — 20600001 HC STEP DOWN PRIVATE ROOM

## 2021-04-01 PROCEDURE — 97535 SELF CARE MNGMENT TRAINING: CPT

## 2021-04-01 PROCEDURE — 25000003 PHARM REV CODE 250: Performed by: STUDENT IN AN ORGANIZED HEALTH CARE EDUCATION/TRAINING PROGRAM

## 2021-04-01 PROCEDURE — 80048 BASIC METABOLIC PNL TOTAL CA: CPT | Performed by: SURGERY

## 2021-04-01 PROCEDURE — 36415 COLL VENOUS BLD VENIPUNCTURE: CPT | Performed by: SURGERY

## 2021-04-01 PROCEDURE — 97116 GAIT TRAINING THERAPY: CPT

## 2021-04-01 PROCEDURE — 99900035 HC TECH TIME PER 15 MIN (STAT)

## 2021-04-01 RX ORDER — IBUPROFEN 200 MG
24 TABLET ORAL
Status: DISCONTINUED | OUTPATIENT
Start: 2021-04-01 | End: 2021-04-03 | Stop reason: HOSPADM

## 2021-04-01 RX ORDER — IBUPROFEN 200 MG
16 TABLET ORAL
Status: DISCONTINUED | OUTPATIENT
Start: 2021-04-01 | End: 2021-04-03 | Stop reason: HOSPADM

## 2021-04-01 RX ORDER — INSULIN ASPART 100 [IU]/ML
0-5 INJECTION, SOLUTION INTRAVENOUS; SUBCUTANEOUS
Status: DISCONTINUED | OUTPATIENT
Start: 2021-04-01 | End: 2021-04-03 | Stop reason: HOSPADM

## 2021-04-01 RX ORDER — GLUCAGON 1 MG
1 KIT INJECTION
Status: DISCONTINUED | OUTPATIENT
Start: 2021-04-01 | End: 2021-04-03 | Stop reason: HOSPADM

## 2021-04-01 RX ORDER — MAGNESIUM SULFATE HEPTAHYDRATE 40 MG/ML
2 INJECTION, SOLUTION INTRAVENOUS ONCE
Status: COMPLETED | OUTPATIENT
Start: 2021-04-01 | End: 2021-04-01

## 2021-04-01 RX ORDER — ASPIRIN 81 MG/1
81 TABLET ORAL DAILY
Status: DISCONTINUED | OUTPATIENT
Start: 2021-04-01 | End: 2021-04-03 | Stop reason: HOSPADM

## 2021-04-01 RX ADMIN — SOTALOL HYDROCHLORIDE 80 MG: 80 TABLET ORAL at 01:04

## 2021-04-01 RX ADMIN — ALVIMOPAN 12 MG: 12 CAPSULE ORAL at 10:04

## 2021-04-01 RX ADMIN — ACETAMINOPHEN 1000 MG: 10 INJECTION, SOLUTION INTRAVENOUS at 02:04

## 2021-04-01 RX ADMIN — MUPIROCIN: 20 OINTMENT TOPICAL at 08:04

## 2021-04-01 RX ADMIN — ATORVASTATIN CALCIUM 40 MG: 20 TABLET, FILM COATED ORAL at 10:04

## 2021-04-01 RX ADMIN — OXYCODONE HYDROCHLORIDE 5 MG: 5 TABLET ORAL at 10:04

## 2021-04-01 RX ADMIN — ENOXAPARIN SODIUM 40 MG: 40 INJECTION SUBCUTANEOUS at 05:04

## 2021-04-01 RX ADMIN — ALLOPURINOL 300 MG: 100 TABLET ORAL at 06:04

## 2021-04-01 RX ADMIN — SOTALOL HYDROCHLORIDE 80 MG: 80 TABLET ORAL at 10:04

## 2021-04-01 RX ADMIN — ALVIMOPAN 12 MG: 12 CAPSULE ORAL at 08:04

## 2021-04-01 RX ADMIN — SOTALOL HYDROCHLORIDE 80 MG: 80 TABLET ORAL at 08:04

## 2021-04-01 RX ADMIN — ACETAMINOPHEN 1000 MG: 10 INJECTION, SOLUTION INTRAVENOUS at 06:04

## 2021-04-01 RX ADMIN — ASPIRIN 81 MG: 81 TABLET, COATED ORAL at 08:04

## 2021-04-01 RX ADMIN — MUPIROCIN: 20 OINTMENT TOPICAL at 10:04

## 2021-04-01 RX ADMIN — GABAPENTIN 300 MG: 300 CAPSULE ORAL at 10:04

## 2021-04-01 RX ADMIN — ESCITALOPRAM OXALATE 10 MG: 10 TABLET ORAL at 08:04

## 2021-04-01 RX ADMIN — MAGNESIUM SULFATE 2 G: 2 INJECTION INTRAVENOUS at 08:04

## 2021-04-01 RX ADMIN — GABAPENTIN 300 MG: 300 CAPSULE ORAL at 02:04

## 2021-04-01 RX ADMIN — GABAPENTIN 300 MG: 300 CAPSULE ORAL at 09:04

## 2021-04-02 LAB
ANION GAP SERPL CALC-SCNC: 7 MMOL/L (ref 8–16)
BASOPHILS # BLD AUTO: 0.02 K/UL (ref 0–0.2)
BASOPHILS NFR BLD: 0.2 % (ref 0–1.9)
BUN SERPL-MCNC: 13 MG/DL (ref 8–23)
CALCIUM SERPL-MCNC: 8.4 MG/DL (ref 8.7–10.5)
CHLORIDE SERPL-SCNC: 107 MMOL/L (ref 95–110)
CO2 SERPL-SCNC: 28 MMOL/L (ref 23–29)
CREAT SERPL-MCNC: 0.8 MG/DL (ref 0.5–1.4)
DIFFERENTIAL METHOD: ABNORMAL
EOSINOPHIL # BLD AUTO: 0 K/UL (ref 0–0.5)
EOSINOPHIL NFR BLD: 0.2 % (ref 0–8)
ERYTHROCYTE [DISTWIDTH] IN BLOOD BY AUTOMATED COUNT: 15.9 % (ref 11.5–14.5)
EST. GFR  (AFRICAN AMERICAN): >60 ML/MIN/1.73 M^2
EST. GFR  (NON AFRICAN AMERICAN): >60 ML/MIN/1.73 M^2
GLUCOSE SERPL-MCNC: 93 MG/DL (ref 70–110)
HCT VFR BLD AUTO: 32.9 % (ref 40–54)
HGB BLD-MCNC: 10.9 G/DL (ref 14–18)
IMM GRANULOCYTES # BLD AUTO: 0.03 K/UL (ref 0–0.04)
IMM GRANULOCYTES NFR BLD AUTO: 0.3 % (ref 0–0.5)
LYMPHOCYTES # BLD AUTO: 1.1 K/UL (ref 1–4.8)
LYMPHOCYTES NFR BLD: 11.8 % (ref 18–48)
MCH RBC QN AUTO: 31 PG (ref 27–31)
MCHC RBC AUTO-ENTMCNC: 33.1 G/DL (ref 32–36)
MCV RBC AUTO: 94 FL (ref 82–98)
MONOCYTES # BLD AUTO: 0.9 K/UL (ref 0.3–1)
MONOCYTES NFR BLD: 10.1 % (ref 4–15)
NEUTROPHILS # BLD AUTO: 7.1 K/UL (ref 1.8–7.7)
NEUTROPHILS NFR BLD: 77.4 % (ref 38–73)
NRBC BLD-RTO: 0 /100 WBC
PLATELET # BLD AUTO: 155 K/UL (ref 150–450)
PMV BLD AUTO: 9.8 FL (ref 9.2–12.9)
POCT GLUCOSE: 106 MG/DL (ref 70–110)
POCT GLUCOSE: 119 MG/DL (ref 70–110)
POCT GLUCOSE: 150 MG/DL (ref 70–110)
POTASSIUM SERPL-SCNC: 4 MMOL/L (ref 3.5–5.1)
RBC # BLD AUTO: 3.52 M/UL (ref 4.6–6.2)
SODIUM SERPL-SCNC: 142 MMOL/L (ref 136–145)
WBC # BLD AUTO: 9.14 K/UL (ref 3.9–12.7)

## 2021-04-02 PROCEDURE — 25000003 PHARM REV CODE 250: Performed by: COLON & RECTAL SURGERY

## 2021-04-02 PROCEDURE — 20600001 HC STEP DOWN PRIVATE ROOM

## 2021-04-02 PROCEDURE — 25000003 PHARM REV CODE 250: Performed by: STUDENT IN AN ORGANIZED HEALTH CARE EDUCATION/TRAINING PROGRAM

## 2021-04-02 PROCEDURE — 85025 COMPLETE CBC W/AUTO DIFF WBC: CPT | Performed by: STUDENT IN AN ORGANIZED HEALTH CARE EDUCATION/TRAINING PROGRAM

## 2021-04-02 PROCEDURE — 80048 BASIC METABOLIC PNL TOTAL CA: CPT | Performed by: STUDENT IN AN ORGANIZED HEALTH CARE EDUCATION/TRAINING PROGRAM

## 2021-04-02 PROCEDURE — 63600175 PHARM REV CODE 636 W HCPCS: Performed by: SURGERY

## 2021-04-02 PROCEDURE — 25000003 PHARM REV CODE 250: Performed by: SURGERY

## 2021-04-02 PROCEDURE — 36415 COLL VENOUS BLD VENIPUNCTURE: CPT | Performed by: STUDENT IN AN ORGANIZED HEALTH CARE EDUCATION/TRAINING PROGRAM

## 2021-04-02 RX ORDER — TALC
6 POWDER (GRAM) TOPICAL NIGHTLY PRN
Status: DISCONTINUED | OUTPATIENT
Start: 2021-04-02 | End: 2021-04-03 | Stop reason: HOSPADM

## 2021-04-02 RX ADMIN — DIPHENHYDRAMINE HYDROCHLORIDE 25 MG: 50 INJECTION, SOLUTION INTRAMUSCULAR; INTRAVENOUS at 03:04

## 2021-04-02 RX ADMIN — RIVAROXABAN 10 MG: 10 TABLET, FILM COATED ORAL at 05:04

## 2021-04-02 RX ADMIN — SODIUM CHLORIDE 1000 ML: 0.9 INJECTION, SOLUTION INTRAVENOUS at 05:04

## 2021-04-02 RX ADMIN — ACETAMINOPHEN 1000 MG: 500 TABLET ORAL at 02:04

## 2021-04-02 RX ADMIN — OXYCODONE HYDROCHLORIDE 10 MG: 10 TABLET ORAL at 07:04

## 2021-04-02 RX ADMIN — SOTALOL HYDROCHLORIDE 80 MG: 80 TABLET ORAL at 09:04

## 2021-04-02 RX ADMIN — ASPIRIN 81 MG: 81 TABLET, COATED ORAL at 09:04

## 2021-04-02 RX ADMIN — ESCITALOPRAM OXALATE 10 MG: 10 TABLET ORAL at 09:04

## 2021-04-02 RX ADMIN — ALLOPURINOL 300 MG: 100 TABLET ORAL at 06:04

## 2021-04-02 RX ADMIN — OXYCODONE HYDROCHLORIDE 10 MG: 10 TABLET ORAL at 02:04

## 2021-04-02 RX ADMIN — GABAPENTIN 300 MG: 300 CAPSULE ORAL at 09:04

## 2021-04-02 RX ADMIN — TRAMADOL HYDROCHLORIDE 50 MG: 50 TABLET, FILM COATED ORAL at 03:04

## 2021-04-02 RX ADMIN — ACETAMINOPHEN 1000 MG: 500 TABLET ORAL at 09:04

## 2021-04-02 RX ADMIN — ATORVASTATIN CALCIUM 40 MG: 20 TABLET, FILM COATED ORAL at 09:04

## 2021-04-02 RX ADMIN — MELATONIN TAB 3 MG 6 MG: 3 TAB at 11:04

## 2021-04-02 RX ADMIN — OXYCODONE HYDROCHLORIDE 10 MG: 10 TABLET ORAL at 03:04

## 2021-04-02 RX ADMIN — GABAPENTIN 300 MG: 300 CAPSULE ORAL at 02:04

## 2021-04-02 RX ADMIN — ALVIMOPAN 12 MG: 12 CAPSULE ORAL at 09:04

## 2021-04-02 RX ADMIN — MUPIROCIN: 20 OINTMENT TOPICAL at 09:04

## 2021-04-03 VITALS
DIASTOLIC BLOOD PRESSURE: 56 MMHG | RESPIRATION RATE: 16 BRPM | WEIGHT: 214 LBS | HEART RATE: 60 BPM | BODY MASS INDEX: 30.64 KG/M2 | TEMPERATURE: 99 F | OXYGEN SATURATION: 96 % | HEIGHT: 70 IN | SYSTOLIC BLOOD PRESSURE: 118 MMHG

## 2021-04-03 LAB
BASOPHILS # BLD AUTO: 0.01 K/UL (ref 0–0.2)
BASOPHILS NFR BLD: 0.2 % (ref 0–1.9)
CRP SERPL-MCNC: 56.3 MG/L (ref 0–8.2)
DIFFERENTIAL METHOD: ABNORMAL
EOSINOPHIL # BLD AUTO: 0.1 K/UL (ref 0–0.5)
EOSINOPHIL NFR BLD: 0.8 % (ref 0–8)
ERYTHROCYTE [DISTWIDTH] IN BLOOD BY AUTOMATED COUNT: 16.1 % (ref 11.5–14.5)
HCT VFR BLD AUTO: 29.4 % (ref 40–54)
HGB BLD-MCNC: 9.5 G/DL (ref 14–18)
IMM GRANULOCYTES # BLD AUTO: 0.02 K/UL (ref 0–0.04)
IMM GRANULOCYTES NFR BLD AUTO: 0.3 % (ref 0–0.5)
LYMPHOCYTES # BLD AUTO: 1.2 K/UL (ref 1–4.8)
LYMPHOCYTES NFR BLD: 17.7 % (ref 18–48)
MCH RBC QN AUTO: 30.6 PG (ref 27–31)
MCHC RBC AUTO-ENTMCNC: 32.3 G/DL (ref 32–36)
MCV RBC AUTO: 95 FL (ref 82–98)
MONOCYTES # BLD AUTO: 0.9 K/UL (ref 0.3–1)
MONOCYTES NFR BLD: 13 % (ref 4–15)
NEUTROPHILS # BLD AUTO: 4.5 K/UL (ref 1.8–7.7)
NEUTROPHILS NFR BLD: 68 % (ref 38–73)
NRBC BLD-RTO: 0 /100 WBC
PLATELET # BLD AUTO: 126 K/UL (ref 150–450)
PMV BLD AUTO: 9.4 FL (ref 9.2–12.9)
RBC # BLD AUTO: 3.1 M/UL (ref 4.6–6.2)
WBC # BLD AUTO: 6.54 K/UL (ref 3.9–12.7)

## 2021-04-03 PROCEDURE — 25000003 PHARM REV CODE 250: Performed by: STUDENT IN AN ORGANIZED HEALTH CARE EDUCATION/TRAINING PROGRAM

## 2021-04-03 PROCEDURE — 86140 C-REACTIVE PROTEIN: CPT | Performed by: SURGERY

## 2021-04-03 PROCEDURE — 25000003 PHARM REV CODE 250: Performed by: SURGERY

## 2021-04-03 PROCEDURE — 85025 COMPLETE CBC W/AUTO DIFF WBC: CPT | Performed by: STUDENT IN AN ORGANIZED HEALTH CARE EDUCATION/TRAINING PROGRAM

## 2021-04-03 RX ORDER — OXYCODONE HYDROCHLORIDE 5 MG/1
5 TABLET ORAL EVERY 4 HOURS PRN
Qty: 25 TABLET | Refills: 0 | Status: ON HOLD | OUTPATIENT
Start: 2021-04-03 | End: 2021-04-13 | Stop reason: HOSPADM

## 2021-04-03 RX ADMIN — ALLOPURINOL 300 MG: 100 TABLET ORAL at 05:04

## 2021-04-03 RX ADMIN — ESCITALOPRAM OXALATE 10 MG: 10 TABLET ORAL at 08:04

## 2021-04-03 RX ADMIN — SOTALOL HYDROCHLORIDE 80 MG: 80 TABLET ORAL at 08:04

## 2021-04-03 RX ADMIN — ALVIMOPAN 12 MG: 12 CAPSULE ORAL at 08:04

## 2021-04-03 RX ADMIN — ACETAMINOPHEN 1000 MG: 500 TABLET ORAL at 05:04

## 2021-04-03 RX ADMIN — GABAPENTIN 300 MG: 300 CAPSULE ORAL at 08:04

## 2021-04-03 RX ADMIN — MUPIROCIN: 20 OINTMENT TOPICAL at 09:04

## 2021-04-03 RX ADMIN — ASPIRIN 81 MG: 81 TABLET, COATED ORAL at 08:04

## 2021-04-06 ENCOUNTER — PATIENT MESSAGE (OUTPATIENT)
Dept: GASTROENTEROLOGY | Facility: CLINIC | Age: 81
End: 2021-04-06

## 2021-04-07 LAB
FINAL PATHOLOGIC DIAGNOSIS: ABNORMAL
GROSS: ABNORMAL
Lab: ABNORMAL

## 2021-04-10 PROBLEM — I95.9 HYPOTENSION: Status: ACTIVE | Noted: 2021-04-10

## 2021-04-11 PROBLEM — R50.82 POSTOPERATIVE ABDOMINAL PAIN WITH FEVER: Status: ACTIVE | Noted: 2021-04-11

## 2021-04-11 PROBLEM — R10.9 POSTOPERATIVE ABDOMINAL PAIN WITH FEVER: Status: ACTIVE | Noted: 2021-04-11

## 2021-04-11 PROBLEM — G89.18 POSTOPERATIVE ABDOMINAL PAIN WITH FEVER: Status: ACTIVE | Noted: 2021-04-11

## 2021-04-12 ENCOUNTER — TELEPHONE (OUTPATIENT)
Dept: SURGERY | Facility: CLINIC | Age: 81
End: 2021-04-12

## 2021-04-13 ENCOUNTER — TELEPHONE (OUTPATIENT)
Dept: SURGERY | Facility: CLINIC | Age: 81
End: 2021-04-13

## 2021-04-13 PROBLEM — K52.9 COLITIS, ACUTE: Status: ACTIVE | Noted: 2021-04-13

## 2021-04-15 ENCOUNTER — OFFICE VISIT (OUTPATIENT)
Dept: SURGERY | Facility: CLINIC | Age: 81
End: 2021-04-15
Payer: MEDICARE

## 2021-04-15 ENCOUNTER — TELEPHONE (OUTPATIENT)
Dept: SURGERY | Facility: CLINIC | Age: 81
End: 2021-04-15

## 2021-04-15 VITALS
SYSTOLIC BLOOD PRESSURE: 126 MMHG | DIASTOLIC BLOOD PRESSURE: 80 MMHG | WEIGHT: 212.88 LBS | BODY MASS INDEX: 30.48 KG/M2 | HEIGHT: 70 IN

## 2021-04-15 DIAGNOSIS — C18.7 MALIGNANT NEOPLASM OF SIGMOID COLON: Primary | ICD-10-CM

## 2021-04-15 PROCEDURE — 1126F AMNT PAIN NOTED NONE PRSNT: CPT | Mod: S$GLB,,, | Performed by: COLON & RECTAL SURGERY

## 2021-04-15 PROCEDURE — 99024 PR POST-OP FOLLOW-UP VISIT: ICD-10-PCS | Mod: S$GLB,,, | Performed by: COLON & RECTAL SURGERY

## 2021-04-15 PROCEDURE — 1126F PR PAIN SEVERITY QUANTIFIED, NO PAIN PRESENT: ICD-10-PCS | Mod: S$GLB,,, | Performed by: COLON & RECTAL SURGERY

## 2021-04-15 PROCEDURE — 99999 PR PBB SHADOW E&M-EST. PATIENT-LVL IV: CPT | Mod: PBBFAC,,, | Performed by: COLON & RECTAL SURGERY

## 2021-04-15 PROCEDURE — 99024 POSTOP FOLLOW-UP VISIT: CPT | Mod: S$GLB,,, | Performed by: COLON & RECTAL SURGERY

## 2021-04-15 PROCEDURE — 99999 PR PBB SHADOW E&M-EST. PATIENT-LVL IV: ICD-10-PCS | Mod: PBBFAC,,, | Performed by: COLON & RECTAL SURGERY

## 2021-04-16 ENCOUNTER — TELEPHONE (OUTPATIENT)
Dept: SURGERY | Facility: CLINIC | Age: 81
End: 2021-04-16

## 2021-04-22 ENCOUNTER — TELEPHONE (OUTPATIENT)
Dept: SURGERY | Facility: CLINIC | Age: 81
End: 2021-04-22

## 2021-05-01 ENCOUNTER — HOSPITAL ENCOUNTER (INPATIENT)
Facility: HOSPITAL | Age: 81
LOS: 3 days | Discharge: HOME | DRG: 871 | End: 2021-05-04
Attending: EMERGENCY MEDICINE | Admitting: EMERGENCY MEDICINE
Payer: MEDICARE

## 2021-05-01 DIAGNOSIS — E11.9 TYPE 2 DIABETES MELLITUS WITHOUT COMPLICATION, WITHOUT LONG-TERM CURRENT USE OF INSULIN: ICD-10-CM

## 2021-05-01 DIAGNOSIS — I95.9 HYPOTENSION, UNSPECIFIED HYPOTENSION TYPE: ICD-10-CM

## 2021-05-01 DIAGNOSIS — R10.9 ABDOMINAL PAIN: ICD-10-CM

## 2021-05-01 DIAGNOSIS — A49.8 CLOSTRIDIUM DIFFICILE INFECTION: ICD-10-CM

## 2021-05-01 DIAGNOSIS — R10.13 EPIGASTRIC PAIN: ICD-10-CM

## 2021-05-01 DIAGNOSIS — R57.9 SHOCK, UNSPECIFIED: ICD-10-CM

## 2021-05-01 DIAGNOSIS — A41.9 SEPSIS, DUE TO UNSPECIFIED ORGANISM, UNSPECIFIED WHETHER ACUTE ORGAN DYSFUNCTION PRESENT: Primary | ICD-10-CM

## 2021-05-01 LAB
ALBUMIN SERPL BCP-MCNC: 3 G/DL (ref 3.5–5.2)
ALP SERPL-CCNC: 43 U/L (ref 55–135)
ALT SERPL W/O P-5'-P-CCNC: 20 U/L (ref 10–44)
ANION GAP SERPL CALC-SCNC: 10 MMOL/L (ref 8–16)
ANISOCYTOSIS BLD QL SMEAR: SLIGHT
APTT BLDCRRT: 27.6 SEC (ref 21–32)
AST SERPL-CCNC: 25 U/L (ref 10–40)
BASOPHILS # BLD AUTO: 0.01 K/UL (ref 0–0.2)
BASOPHILS NFR BLD: 0.2 % (ref 0–1.9)
BILIRUB SERPL-MCNC: 1.8 MG/DL (ref 0.1–1)
BUN SERPL-MCNC: 14 MG/DL (ref 6–30)
BUN SERPL-MCNC: 14 MG/DL (ref 8–23)
CALCIUM SERPL-MCNC: 8.2 MG/DL (ref 8.7–10.5)
CHLORIDE SERPL-SCNC: 103 MMOL/L (ref 95–110)
CHLORIDE SERPL-SCNC: 105 MMOL/L (ref 95–110)
CO2 SERPL-SCNC: 24 MMOL/L (ref 23–29)
CREAT SERPL-MCNC: 1.3 MG/DL (ref 0.5–1.4)
CREAT SERPL-MCNC: 1.3 MG/DL (ref 0.5–1.4)
CTP QC/QA: YES
DIFFERENTIAL METHOD: ABNORMAL
EOSINOPHIL # BLD AUTO: 0 K/UL (ref 0–0.5)
EOSINOPHIL NFR BLD: 0.5 % (ref 0–8)
ERYTHROCYTE [DISTWIDTH] IN BLOOD BY AUTOMATED COUNT: 16.1 % (ref 11.5–14.5)
EST. GFR  (AFRICAN AMERICAN): 59.6 ML/MIN/1.73 M^2
EST. GFR  (NON AFRICAN AMERICAN): 51.5 ML/MIN/1.73 M^2
GIANT PLATELETS BLD QL SMEAR: PRESENT
GLUCOSE SERPL-MCNC: 109 MG/DL (ref 70–110)
GLUCOSE SERPL-MCNC: 111 MG/DL (ref 70–110)
HCT VFR BLD AUTO: 35.5 % (ref 40–54)
HCT VFR BLD CALC: 35 %PCV (ref 36–54)
HGB BLD-MCNC: 11.5 G/DL (ref 14–18)
HYPOCHROMIA BLD QL SMEAR: ABNORMAL
IMM GRANULOCYTES # BLD AUTO: 0.01 K/UL (ref 0–0.04)
IMM GRANULOCYTES NFR BLD AUTO: 0.2 % (ref 0–0.5)
INR PPP: 1.2 (ref 0.8–1.2)
LACTATE SERPL-SCNC: 2.6 MMOL/L (ref 0.5–2.2)
LACTATE SERPL-SCNC: 2.7 MMOL/L (ref 0.5–2.2)
LIPASE SERPL-CCNC: 11 U/L (ref 4–60)
LYMPHOCYTES # BLD AUTO: 0.7 K/UL (ref 1–4.8)
LYMPHOCYTES NFR BLD: 13 % (ref 18–48)
MCH RBC QN AUTO: 31.6 PG (ref 27–31)
MCHC RBC AUTO-ENTMCNC: 32.4 G/DL (ref 32–36)
MCV RBC AUTO: 98 FL (ref 82–98)
MONOCYTES # BLD AUTO: 1 K/UL (ref 0.3–1)
MONOCYTES NFR BLD: 17.5 % (ref 4–15)
NEUTROPHILS # BLD AUTO: 3.8 K/UL (ref 1.8–7.7)
NEUTROPHILS NFR BLD: 68.6 % (ref 38–73)
NRBC BLD-RTO: 0 /100 WBC
OVALOCYTES BLD QL SMEAR: ABNORMAL
PLATELET # BLD AUTO: 166 K/UL (ref 150–450)
PLATELET BLD QL SMEAR: ABNORMAL
PMV BLD AUTO: 10.2 FL (ref 9.2–12.9)
POC IONIZED CALCIUM: 1.04 MMOL/L (ref 1.06–1.42)
POC TCO2 (MEASURED): 23 MMOL/L (ref 23–29)
POIKILOCYTOSIS BLD QL SMEAR: SLIGHT
POTASSIUM BLD-SCNC: 3.6 MMOL/L (ref 3.5–5.1)
POTASSIUM SERPL-SCNC: 3.4 MMOL/L (ref 3.5–5.1)
PROT SERPL-MCNC: 6 G/DL (ref 6–8.4)
PROTHROMBIN TIME: 13 SEC (ref 9–12.5)
RBC # BLD AUTO: 3.64 M/UL (ref 4.6–6.2)
SAMPLE: ABNORMAL
SARS-COV-2 RDRP RESP QL NAA+PROBE: NEGATIVE
SODIUM BLD-SCNC: 139 MMOL/L (ref 136–145)
SODIUM SERPL-SCNC: 139 MMOL/L (ref 136–145)
TROPONIN I SERPL DL<=0.01 NG/ML-MCNC: 0.02 NG/ML (ref 0–0.03)
WBC # BLD AUTO: 5.53 K/UL (ref 3.9–12.7)

## 2021-05-01 PROCEDURE — 80047 BASIC METABLC PNL IONIZED CA: CPT

## 2021-05-01 PROCEDURE — 93010 EKG 12-LEAD: ICD-10-PCS | Mod: ,,, | Performed by: INTERNAL MEDICINE

## 2021-05-01 PROCEDURE — 93005 ELECTROCARDIOGRAM TRACING: CPT

## 2021-05-01 PROCEDURE — 63600175 PHARM REV CODE 636 W HCPCS: Performed by: PHYSICIAN ASSISTANT

## 2021-05-01 PROCEDURE — 87329 GIARDIA AG IA: CPT | Performed by: STUDENT IN AN ORGANIZED HEALTH CARE EDUCATION/TRAINING PROGRAM

## 2021-05-01 PROCEDURE — 80053 COMPREHEN METABOLIC PANEL: CPT | Performed by: PHYSICIAN ASSISTANT

## 2021-05-01 PROCEDURE — 63600175 PHARM REV CODE 636 W HCPCS: Performed by: STUDENT IN AN ORGANIZED HEALTH CARE EDUCATION/TRAINING PROGRAM

## 2021-05-01 PROCEDURE — 63600175 PHARM REV CODE 636 W HCPCS: Performed by: EMERGENCY MEDICINE

## 2021-05-01 PROCEDURE — 25000003 PHARM REV CODE 250: Performed by: STUDENT IN AN ORGANIZED HEALTH CARE EDUCATION/TRAINING PROGRAM

## 2021-05-01 PROCEDURE — 25500020 PHARM REV CODE 255: Performed by: EMERGENCY MEDICINE

## 2021-05-01 PROCEDURE — 84484 ASSAY OF TROPONIN QUANT: CPT | Performed by: PHYSICIAN ASSISTANT

## 2021-05-01 PROCEDURE — 99291 CRITICAL CARE FIRST HOUR: CPT | Mod: CS,,, | Performed by: EMERGENCY MEDICINE

## 2021-05-01 PROCEDURE — 87209 SMEAR COMPLEX STAIN: CPT | Performed by: STUDENT IN AN ORGANIZED HEALTH CARE EDUCATION/TRAINING PROGRAM

## 2021-05-01 PROCEDURE — 83605 ASSAY OF LACTIC ACID: CPT | Performed by: PHYSICIAN ASSISTANT

## 2021-05-01 PROCEDURE — 25000003 PHARM REV CODE 250: Performed by: PHYSICIAN ASSISTANT

## 2021-05-01 PROCEDURE — 83605 ASSAY OF LACTIC ACID: CPT | Mod: 91 | Performed by: EMERGENCY MEDICINE

## 2021-05-01 PROCEDURE — 85610 PROTHROMBIN TIME: CPT | Performed by: PHYSICIAN ASSISTANT

## 2021-05-01 PROCEDURE — 87324 CLOSTRIDIUM AG IA: CPT | Performed by: PHYSICIAN ASSISTANT

## 2021-05-01 PROCEDURE — 96361 HYDRATE IV INFUSION ADD-ON: CPT

## 2021-05-01 PROCEDURE — 99285 EMERGENCY DEPT VISIT HI MDM: CPT | Mod: 25

## 2021-05-01 PROCEDURE — U0002 COVID-19 LAB TEST NON-CDC: HCPCS | Performed by: PHYSICIAN ASSISTANT

## 2021-05-01 PROCEDURE — 87040 BLOOD CULTURE FOR BACTERIA: CPT | Performed by: PHYSICIAN ASSISTANT

## 2021-05-01 PROCEDURE — 87425 ROTAVIRUS AG IA: CPT | Performed by: STUDENT IN AN ORGANIZED HEALTH CARE EDUCATION/TRAINING PROGRAM

## 2021-05-01 PROCEDURE — 96365 THER/PROPH/DIAG IV INF INIT: CPT

## 2021-05-01 PROCEDURE — 83690 ASSAY OF LIPASE: CPT | Performed by: PHYSICIAN ASSISTANT

## 2021-05-01 PROCEDURE — 25000003 PHARM REV CODE 250: Performed by: EMERGENCY MEDICINE

## 2021-05-01 PROCEDURE — 20000000 HC ICU ROOM

## 2021-05-01 PROCEDURE — 87449 NOS EACH ORGANISM AG IA: CPT | Performed by: PHYSICIAN ASSISTANT

## 2021-05-01 PROCEDURE — 85025 COMPLETE CBC W/AUTO DIFF WBC: CPT | Performed by: PHYSICIAN ASSISTANT

## 2021-05-01 PROCEDURE — 83605 ASSAY OF LACTIC ACID: CPT | Mod: 91 | Performed by: STUDENT IN AN ORGANIZED HEALTH CARE EDUCATION/TRAINING PROGRAM

## 2021-05-01 PROCEDURE — 93010 ELECTROCARDIOGRAM REPORT: CPT | Mod: ,,, | Performed by: INTERNAL MEDICINE

## 2021-05-01 PROCEDURE — 85730 THROMBOPLASTIN TIME PARTIAL: CPT | Performed by: PHYSICIAN ASSISTANT

## 2021-05-01 PROCEDURE — 89055 LEUKOCYTE ASSESSMENT FECAL: CPT | Performed by: STUDENT IN AN ORGANIZED HEALTH CARE EDUCATION/TRAINING PROGRAM

## 2021-05-01 PROCEDURE — 99291 PR CRITICAL CARE, E/M 30-74 MINUTES: ICD-10-PCS | Mod: CS,,, | Performed by: EMERGENCY MEDICINE

## 2021-05-01 RX ORDER — ALPRAZOLAM 0.5 MG/1
0.5 TABLET ORAL NIGHTLY PRN
Status: DISCONTINUED | OUTPATIENT
Start: 2021-05-01 | End: 2021-05-02

## 2021-05-01 RX ORDER — NITROGLYCERIN 0.4 MG/1
0.4 TABLET SUBLINGUAL EVERY 5 MIN PRN
Status: DISCONTINUED | OUTPATIENT
Start: 2021-05-01 | End: 2021-05-04 | Stop reason: HOSPADM

## 2021-05-01 RX ORDER — SODIUM CHLORIDE 0.9 % (FLUSH) 0.9 %
10 SYRINGE (ML) INJECTION
Status: DISCONTINUED | OUTPATIENT
Start: 2021-05-01 | End: 2021-05-04 | Stop reason: HOSPADM

## 2021-05-01 RX ORDER — MAGNESIUM SULFATE 1 G/100ML
1 INJECTION INTRAVENOUS ONCE
Status: COMPLETED | OUTPATIENT
Start: 2021-05-01 | End: 2021-05-02

## 2021-05-01 RX ORDER — ASPIRIN 81 MG/1
81 TABLET ORAL DAILY
Status: DISCONTINUED | OUTPATIENT
Start: 2021-05-02 | End: 2021-05-04 | Stop reason: HOSPADM

## 2021-05-01 RX ORDER — ALLOPURINOL 300 MG/1
300 TABLET ORAL EVERY MORNING
Status: DISCONTINUED | OUTPATIENT
Start: 2021-05-02 | End: 2021-05-04 | Stop reason: HOSPADM

## 2021-05-01 RX ORDER — SODIUM CHLORIDE AND POTASSIUM CHLORIDE 150; 450 MG/100ML; MG/100ML
INJECTION, SOLUTION INTRAVENOUS CONTINUOUS
Status: DISPENSED | OUTPATIENT
Start: 2021-05-01 | End: 2021-05-02

## 2021-05-01 RX ORDER — ATORVASTATIN CALCIUM 20 MG/1
40 TABLET, FILM COATED ORAL NIGHTLY
Status: DISCONTINUED | OUTPATIENT
Start: 2021-05-01 | End: 2021-05-04 | Stop reason: HOSPADM

## 2021-05-01 RX ORDER — ACETAMINOPHEN 500 MG
1000 TABLET ORAL
Status: COMPLETED | OUTPATIENT
Start: 2021-05-01 | End: 2021-05-01

## 2021-05-01 RX ORDER — NOREPINEPHRINE BITARTRATE/D5W 4MG/250ML
0.05 PLASTIC BAG, INJECTION (ML) INTRAVENOUS CONTINUOUS
Status: DISCONTINUED | OUTPATIENT
Start: 2021-05-01 | End: 2021-05-02

## 2021-05-01 RX ORDER — ONDANSETRON 8 MG/1
8 TABLET, ORALLY DISINTEGRATING ORAL EVERY 6 HOURS PRN
Status: DISCONTINUED | OUTPATIENT
Start: 2021-05-02 | End: 2021-05-04 | Stop reason: HOSPADM

## 2021-05-01 RX ADMIN — SODIUM CHLORIDE, SODIUM LACTATE, POTASSIUM CHLORIDE, AND CALCIUM CHLORIDE 1000 ML: .6; .31; .03; .02 INJECTION, SOLUTION INTRAVENOUS at 07:05

## 2021-05-01 RX ADMIN — MAGNESIUM SULFATE HEPTAHYDRATE 1 G: 500 INJECTION, SOLUTION INTRAMUSCULAR; INTRAVENOUS at 11:05

## 2021-05-01 RX ADMIN — SODIUM CHLORIDE, SODIUM LACTATE, POTASSIUM CHLORIDE, AND CALCIUM CHLORIDE 1190 ML: .6; .31; .03; .02 INJECTION, SOLUTION INTRAVENOUS at 04:05

## 2021-05-01 RX ADMIN — PIPERACILLIN SODIUM AND TAZOBACTAM SODIUM 4.5 G: 4; .5 INJECTION, POWDER, FOR SOLUTION INTRAVENOUS at 11:05

## 2021-05-01 RX ADMIN — IOHEXOL 100 ML: 350 INJECTION, SOLUTION INTRAVENOUS at 04:05

## 2021-05-01 RX ADMIN — ACETAMINOPHEN 1000 MG: 500 TABLET, FILM COATED ORAL at 02:05

## 2021-05-01 RX ADMIN — SODIUM CHLORIDE, SODIUM LACTATE, POTASSIUM CHLORIDE, AND CALCIUM CHLORIDE 1000 ML: .6; .31; .03; .02 INJECTION, SOLUTION INTRAVENOUS at 09:05

## 2021-05-01 RX ADMIN — POTASSIUM CHLORIDE AND SODIUM CHLORIDE: 450; 150 INJECTION, SOLUTION INTRAVENOUS at 10:05

## 2021-05-01 RX ADMIN — SODIUM CHLORIDE 1000 ML: 0.9 INJECTION, SOLUTION INTRAVENOUS at 02:05

## 2021-05-01 RX ADMIN — Medication 0.05 MCG/KG/MIN: at 08:05

## 2021-05-01 RX ADMIN — POTASSIUM BICARBONATE 25 MEQ: 978 TABLET, EFFERVESCENT ORAL at 11:05

## 2021-05-01 RX ADMIN — PIPERACILLIN SODIUM AND TAZOBACTAM SODIUM 4.5 G: 4; .5 INJECTION, POWDER, FOR SOLUTION INTRAVENOUS at 03:05

## 2021-05-01 RX ADMIN — ATORVASTATIN CALCIUM 40 MG: 20 TABLET, FILM COATED ORAL at 09:05

## 2021-05-02 PROBLEM — R65.21 SEPTIC SHOCK: Status: ACTIVE | Noted: 2021-05-01

## 2021-05-02 PROBLEM — E78.5 HLD (HYPERLIPIDEMIA): Status: ACTIVE | Noted: 2021-05-02

## 2021-05-02 PROBLEM — A49.8 CLOSTRIDIUM DIFFICILE INFECTION: Status: ACTIVE | Noted: 2021-05-02

## 2021-05-02 PROBLEM — R33.9 URINARY RETENTION: Status: ACTIVE | Noted: 2021-05-02

## 2021-05-02 LAB
ALBUMIN SERPL BCP-MCNC: 2.7 G/DL (ref 3.5–5.2)
ALP SERPL-CCNC: 36 U/L (ref 55–135)
ALT SERPL W/O P-5'-P-CCNC: 21 U/L (ref 10–44)
ANION GAP SERPL CALC-SCNC: 11 MMOL/L (ref 8–16)
ANION GAP SERPL CALC-SCNC: 9 MMOL/L (ref 8–16)
ANISOCYTOSIS BLD QL SMEAR: SLIGHT
ASCENDING AORTA: 3.93 CM
AST SERPL-CCNC: 37 U/L (ref 10–40)
AV INDEX (PROSTH): 0.79
AV MEAN GRADIENT: 4 MMHG
AV PEAK GRADIENT: 6 MMHG
AV VALVE AREA: 3.26 CM2
AV VELOCITY RATIO: 0.73
BACTERIA #/AREA URNS AUTO: NORMAL /HPF
BASOPHILS # BLD AUTO: 0.03 K/UL (ref 0–0.2)
BASOPHILS NFR BLD: 0.5 % (ref 0–1.9)
BILIRUB SERPL-MCNC: 1.8 MG/DL (ref 0.1–1)
BILIRUB UR QL STRIP: NEGATIVE
BSA FOR ECHO PROCEDURE: 2.21 M2
BUN SERPL-MCNC: 17 MG/DL (ref 8–23)
BUN SERPL-MCNC: 18 MG/DL (ref 8–23)
C DIFF GDH STL QL: POSITIVE
C DIFF TOX A+B STL QL IA: POSITIVE
CALCIUM SERPL-MCNC: 7.7 MG/DL (ref 8.7–10.5)
CALCIUM SERPL-MCNC: 7.9 MG/DL (ref 8.7–10.5)
CHLORIDE SERPL-SCNC: 104 MMOL/L (ref 95–110)
CHLORIDE SERPL-SCNC: 106 MMOL/L (ref 95–110)
CLARITY UR REFRACT.AUTO: ABNORMAL
CO2 SERPL-SCNC: 19 MMOL/L (ref 23–29)
CO2 SERPL-SCNC: 22 MMOL/L (ref 23–29)
COLOR UR AUTO: ABNORMAL
CREAT SERPL-MCNC: 1 MG/DL (ref 0.5–1.4)
CREAT SERPL-MCNC: 1.4 MG/DL (ref 0.5–1.4)
CV ECHO LV RWT: 0.42 CM
DIFFERENTIAL METHOD: ABNORMAL
DOP CALC AO PEAK VEL: 1.22 M/S
DOP CALC AO VTI: 23.03 CM
DOP CALC LVOT AREA: 4.1 CM2
DOP CALC LVOT DIAMETER: 2.29 CM
DOP CALC LVOT PEAK VEL: 0.89 M/S
DOP CALC LVOT STROKE VOLUME: 75.09 CM3
DOP CALCLVOT PEAK VEL VTI: 18.24 CM
E WAVE DECELERATION TIME: 260.81 MSEC
E/A RATIO: 1.07
E/E' RATIO: 13.14 M/S
ECHO LV POSTERIOR WALL: 0.97 CM (ref 0.6–1.1)
EJECTION FRACTION: 55 %
EOSINOPHIL # BLD AUTO: 0.1 K/UL (ref 0–0.5)
EOSINOPHIL NFR BLD: 1.4 % (ref 0–8)
ERYTHROCYTE [DISTWIDTH] IN BLOOD BY AUTOMATED COUNT: 16.9 % (ref 11.5–14.5)
EST. GFR  (AFRICAN AMERICAN): 54.5 ML/MIN/1.73 M^2
EST. GFR  (AFRICAN AMERICAN): >60 ML/MIN/1.73 M^2
EST. GFR  (NON AFRICAN AMERICAN): 47.1 ML/MIN/1.73 M^2
EST. GFR  (NON AFRICAN AMERICAN): >60 ML/MIN/1.73 M^2
FRACTIONAL SHORTENING: 27 % (ref 28–44)
GLUCOSE SERPL-MCNC: 101 MG/DL (ref 70–110)
GLUCOSE SERPL-MCNC: 68 MG/DL (ref 70–110)
GLUCOSE UR QL STRIP: NEGATIVE
HCT VFR BLD AUTO: 31.8 % (ref 40–54)
HGB BLD-MCNC: 10.4 G/DL (ref 14–18)
HGB UR QL STRIP: NEGATIVE
HYALINE CASTS UR QL AUTO: 0 /LPF
IMM GRANULOCYTES # BLD AUTO: 0.08 K/UL (ref 0–0.04)
IMM GRANULOCYTES NFR BLD AUTO: 1.3 % (ref 0–0.5)
INTERVENTRICULAR SEPTUM: 1.1 CM (ref 0.6–1.1)
IVRT: 94.2 MSEC
KETONES UR QL STRIP: NEGATIVE
LA MAJOR: 6.75 CM
LA MINOR: 6.77 CM
LA WIDTH: 4.21 CM
LACTATE SERPL-SCNC: 2.1 MMOL/L (ref 0.5–2.2)
LACTATE SERPL-SCNC: 2.5 MMOL/L (ref 0.5–2.2)
LEFT ATRIUM SIZE: 3.89 CM
LEFT ATRIUM VOLUME INDEX MOD: 37.3 ML/M2
LEFT ATRIUM VOLUME INDEX: 43.4 ML/M2
LEFT ATRIUM VOLUME MOD: 81 CM3
LEFT ATRIUM VOLUME: 94.1 CM3
LEFT INTERNAL DIMENSION IN SYSTOLE: 3.35 CM (ref 2.1–4)
LEFT VENTRICLE DIASTOLIC VOLUME INDEX: 44.38 ML/M2
LEFT VENTRICLE DIASTOLIC VOLUME: 96.31 ML
LEFT VENTRICLE MASS INDEX: 76 G/M2
LEFT VENTRICLE SYSTOLIC VOLUME INDEX: 21.1 ML/M2
LEFT VENTRICLE SYSTOLIC VOLUME: 45.75 ML
LEFT VENTRICULAR INTERNAL DIMENSION IN DIASTOLE: 4.58 CM (ref 3.5–6)
LEFT VENTRICULAR MASS: 165.34 G
LEUKOCYTE ESTERASE UR QL STRIP: NEGATIVE
LV LATERAL E/E' RATIO: 11.5 M/S
LV SEPTAL E/E' RATIO: 15.33 M/S
LYMPHOCYTES # BLD AUTO: 0.5 K/UL (ref 1–4.8)
LYMPHOCYTES NFR BLD: 8 % (ref 18–48)
MAGNESIUM SERPL-MCNC: 1.3 MG/DL (ref 1.6–2.6)
MAGNESIUM SERPL-MCNC: 1.6 MG/DL (ref 1.6–2.6)
MCH RBC QN AUTO: 32.4 PG (ref 27–31)
MCHC RBC AUTO-ENTMCNC: 32.7 G/DL (ref 32–36)
MCV RBC AUTO: 99 FL (ref 82–98)
MICROSCOPIC COMMENT: NORMAL
MONOCYTES # BLD AUTO: 0.9 K/UL (ref 0.3–1)
MONOCYTES NFR BLD: 14.8 % (ref 4–15)
MV PEAK A VEL: 0.86 M/S
MV PEAK E VEL: 0.92 M/S
MV STENOSIS PRESSURE HALF TIME: 75.64 MS
MV VALVE AREA P 1/2 METHOD: 2.91 CM2
NEUTROPHILS # BLD AUTO: 4.7 K/UL (ref 1.8–7.7)
NEUTROPHILS NFR BLD: 74 % (ref 38–73)
NITRITE UR QL STRIP: NEGATIVE
NRBC BLD-RTO: 0 /100 WBC
OVALOCYTES BLD QL SMEAR: ABNORMAL
PH UR STRIP: 5 [PH] (ref 5–8)
PHOSPHATE SERPL-MCNC: 2.1 MG/DL (ref 2.7–4.5)
PHOSPHATE SERPL-MCNC: 3.7 MG/DL (ref 2.7–4.5)
PISA TR MAX VEL: 3.05 M/S
PLATELET # BLD AUTO: 121 K/UL (ref 150–450)
PLATELET BLD QL SMEAR: ABNORMAL
PMV BLD AUTO: 11.1 FL (ref 9.2–12.9)
POCT GLUCOSE: 72 MG/DL (ref 70–110)
POCT GLUCOSE: 82 MG/DL (ref 70–110)
POCT GLUCOSE: 83 MG/DL (ref 70–110)
POIKILOCYTOSIS BLD QL SMEAR: SLIGHT
POTASSIUM SERPL-SCNC: 4 MMOL/L (ref 3.5–5.1)
POTASSIUM SERPL-SCNC: 4.6 MMOL/L (ref 3.5–5.1)
PROT SERPL-MCNC: 6 G/DL (ref 6–8.4)
PROT UR QL STRIP: ABNORMAL
RA MAJOR: 5.68 CM
RA PRESSURE: 3 MMHG
RA WIDTH: 4.1 CM
RBC # BLD AUTO: 3.21 M/UL (ref 4.6–6.2)
RBC #/AREA URNS AUTO: 2 /HPF (ref 0–4)
RIGHT ATRIAL AREA: 18.5 CM2
RIGHT VENTRICULAR END-DIASTOLIC DIMENSION: 3.68 CM
RV TISSUE DOPPLER FREE WALL SYSTOLIC VELOCITY 1 (APICAL 4 CHAMBER VIEW): 13.9 CM/S
SINUS: 4.2 CM
SODIUM SERPL-SCNC: 134 MMOL/L (ref 136–145)
SODIUM SERPL-SCNC: 137 MMOL/L (ref 136–145)
SP GR UR STRIP: >=1.03 (ref 1–1.03)
SQUAMOUS #/AREA URNS AUTO: 0 /HPF
STJ: 3.29 CM
TDI LATERAL: 0.08 M/S
TDI SEPTAL: 0.06 M/S
TDI: 0.07 M/S
TR MAX PG: 37 MMHG
TRICUSPID ANNULAR PLANE SYSTOLIC EXCURSION: 2.28 CM
TV REST PULMONARY ARTERY PRESSURE: 40 MMHG
URN SPEC COLLECT METH UR: ABNORMAL
WBC # BLD AUTO: 6.35 K/UL (ref 3.9–12.7)
WBC #/AREA STL HPF: ABNORMAL /[HPF]
WBC #/AREA URNS AUTO: 1 /HPF (ref 0–5)

## 2021-05-02 PROCEDURE — 87045 FECES CULTURE AEROBIC BACT: CPT | Performed by: STUDENT IN AN ORGANIZED HEALTH CARE EDUCATION/TRAINING PROGRAM

## 2021-05-02 PROCEDURE — 25000003 PHARM REV CODE 250: Performed by: EMERGENCY MEDICINE

## 2021-05-02 PROCEDURE — 63600175 PHARM REV CODE 636 W HCPCS: Performed by: STUDENT IN AN ORGANIZED HEALTH CARE EDUCATION/TRAINING PROGRAM

## 2021-05-02 PROCEDURE — 83735 ASSAY OF MAGNESIUM: CPT | Mod: 91 | Performed by: INTERNAL MEDICINE

## 2021-05-02 PROCEDURE — 84100 ASSAY OF PHOSPHORUS: CPT | Mod: 91 | Performed by: INTERNAL MEDICINE

## 2021-05-02 PROCEDURE — 80048 BASIC METABOLIC PNL TOTAL CA: CPT | Performed by: INTERNAL MEDICINE

## 2021-05-02 PROCEDURE — 99223 PR INITIAL HOSPITAL CARE,LEVL III: ICD-10-PCS | Mod: GC,,, | Performed by: INTERNAL MEDICINE

## 2021-05-02 PROCEDURE — 99223 1ST HOSP IP/OBS HIGH 75: CPT | Mod: GC,,, | Performed by: INTERNAL MEDICINE

## 2021-05-02 PROCEDURE — 85025 COMPLETE CBC W/AUTO DIFF WBC: CPT | Performed by: STUDENT IN AN ORGANIZED HEALTH CARE EDUCATION/TRAINING PROGRAM

## 2021-05-02 PROCEDURE — 25000003 PHARM REV CODE 250: Performed by: STUDENT IN AN ORGANIZED HEALTH CARE EDUCATION/TRAINING PROGRAM

## 2021-05-02 PROCEDURE — 87427 SHIGA-LIKE TOXIN AG IA: CPT | Performed by: STUDENT IN AN ORGANIZED HEALTH CARE EDUCATION/TRAINING PROGRAM

## 2021-05-02 PROCEDURE — 80053 COMPREHEN METABOLIC PANEL: CPT | Performed by: INTERNAL MEDICINE

## 2021-05-02 PROCEDURE — 81001 URINALYSIS AUTO W/SCOPE: CPT | Performed by: PHYSICIAN ASSISTANT

## 2021-05-02 PROCEDURE — 87046 STOOL CULTR AEROBIC BACT EA: CPT | Mod: 59 | Performed by: STUDENT IN AN ORGANIZED HEALTH CARE EDUCATION/TRAINING PROGRAM

## 2021-05-02 PROCEDURE — 20000000 HC ICU ROOM

## 2021-05-02 PROCEDURE — 83735 ASSAY OF MAGNESIUM: CPT | Performed by: INTERNAL MEDICINE

## 2021-05-02 PROCEDURE — 99900035 HC TECH TIME PER 15 MIN (STAT)

## 2021-05-02 PROCEDURE — S0030 INJECTION, METRONIDAZOLE: HCPCS | Performed by: STUDENT IN AN ORGANIZED HEALTH CARE EDUCATION/TRAINING PROGRAM

## 2021-05-02 PROCEDURE — 94761 N-INVAS EAR/PLS OXIMETRY MLT: CPT

## 2021-05-02 PROCEDURE — 25000003 PHARM REV CODE 250: Performed by: INTERNAL MEDICINE

## 2021-05-02 PROCEDURE — 83605 ASSAY OF LACTIC ACID: CPT | Performed by: STUDENT IN AN ORGANIZED HEALTH CARE EDUCATION/TRAINING PROGRAM

## 2021-05-02 PROCEDURE — 84100 ASSAY OF PHOSPHORUS: CPT | Performed by: INTERNAL MEDICINE

## 2021-05-02 RX ORDER — VANCOMYCIN HCL IN 5 % DEXTROSE 1G/250ML
1000 PLASTIC BAG, INJECTION (ML) INTRAVENOUS
Status: DISCONTINUED | OUTPATIENT
Start: 2021-05-03 | End: 2021-05-04

## 2021-05-02 RX ORDER — METRONIDAZOLE 500 MG/100ML
500 INJECTION, SOLUTION INTRAVENOUS
Status: DISCONTINUED | OUTPATIENT
Start: 2021-05-02 | End: 2021-05-04 | Stop reason: HOSPADM

## 2021-05-02 RX ORDER — DIPHENHYDRAMINE HCL 25 MG
25 CAPSULE ORAL EVERY 6 HOURS PRN
Status: DISCONTINUED | OUTPATIENT
Start: 2021-05-02 | End: 2021-05-04 | Stop reason: HOSPADM

## 2021-05-02 RX ORDER — FAMOTIDINE 20 MG/1
20 TABLET, FILM COATED ORAL DAILY
Status: DISCONTINUED | OUTPATIENT
Start: 2021-05-02 | End: 2021-05-03

## 2021-05-02 RX ORDER — MAGNESIUM SULFATE HEPTAHYDRATE 40 MG/ML
2 INJECTION, SOLUTION INTRAVENOUS ONCE
Status: COMPLETED | OUTPATIENT
Start: 2021-05-02 | End: 2021-05-02

## 2021-05-02 RX ORDER — FAMOTIDINE 20 MG/1
20 TABLET, FILM COATED ORAL 2 TIMES DAILY
Status: DISCONTINUED | OUTPATIENT
Start: 2021-05-02 | End: 2021-05-02

## 2021-05-02 RX ORDER — DIPHENHYDRAMINE HCL 25 MG
25 CAPSULE ORAL EVERY 6 HOURS PRN
Status: DISCONTINUED | OUTPATIENT
Start: 2021-05-02 | End: 2021-05-02

## 2021-05-02 RX ADMIN — DIPHENHYDRAMINE HYDROCHLORIDE 25 MG: 25 CAPSULE ORAL at 09:05

## 2021-05-02 RX ADMIN — Medication 0.1 MCG/KG/MIN: at 04:05

## 2021-05-02 RX ADMIN — VANCOMYCIN HYDROCHLORIDE 500 MG: KIT at 05:05

## 2021-05-02 RX ADMIN — ATORVASTATIN CALCIUM 40 MG: 20 TABLET, FILM COATED ORAL at 09:05

## 2021-05-02 RX ADMIN — POTASSIUM CHLORIDE AND SODIUM CHLORIDE 100 ML/HR: 450; 150 INJECTION, SOLUTION INTRAVENOUS at 11:05

## 2021-05-02 RX ADMIN — VANCOMYCIN HYDROCHLORIDE 500 MG: KIT at 11:05

## 2021-05-02 RX ADMIN — SODIUM PHOSPHATE, MONOBASIC, MONOHYDRATE 15 MMOL: 276; 142 INJECTION, SOLUTION INTRAVENOUS at 09:05

## 2021-05-02 RX ADMIN — VANCOMYCIN HYDROCHLORIDE 500 MG: KIT at 02:05

## 2021-05-02 RX ADMIN — MAGNESIUM SULFATE 2 G: 2 INJECTION INTRAVENOUS at 08:05

## 2021-05-02 RX ADMIN — METRONIDAZOLE 500 MG: 500 INJECTION, SOLUTION INTRAVENOUS at 02:05

## 2021-05-02 RX ADMIN — METRONIDAZOLE 500 MG: 500 INJECTION, SOLUTION INTRAVENOUS at 05:05

## 2021-05-02 RX ADMIN — VANCOMYCIN HYDROCHLORIDE 2000 MG: 5 INJECTION, POWDER, LYOPHILIZED, FOR SOLUTION INTRAVENOUS at 11:05

## 2021-05-02 RX ADMIN — ALLOPURINOL 300 MG: 100 TABLET ORAL at 06:05

## 2021-05-02 RX ADMIN — DIPHENHYDRAMINE HYDROCHLORIDE 25 MG: 25 CAPSULE ORAL at 02:05

## 2021-05-02 RX ADMIN — SODIUM CHLORIDE, SODIUM LACTATE, POTASSIUM CHLORIDE, AND CALCIUM CHLORIDE 500 ML: .6; .31; .03; .02 INJECTION, SOLUTION INTRAVENOUS at 11:05

## 2021-05-02 RX ADMIN — MAGNESIUM SULFATE 2 G: 2 INJECTION INTRAVENOUS at 11:05

## 2021-05-02 RX ADMIN — ASPIRIN 81 MG: 81 TABLET, COATED ORAL at 09:05

## 2021-05-02 RX ADMIN — METRONIDAZOLE 500 MG: 500 INJECTION, SOLUTION INTRAVENOUS at 10:05

## 2021-05-02 RX ADMIN — FAMOTIDINE 20 MG: 20 TABLET, FILM COATED ORAL at 09:05

## 2021-05-02 RX ADMIN — RIVAROXABAN 10 MG: 10 TABLET, FILM COATED ORAL at 05:05

## 2021-05-03 PROBLEM — C18.9 COLON CANCER: Chronic | Status: ACTIVE | Noted: 2021-03-31

## 2021-05-03 PROBLEM — E78.5 HLD (HYPERLIPIDEMIA): Chronic | Status: ACTIVE | Noted: 2021-05-02

## 2021-05-03 PROBLEM — I95.9 HYPOTENSION: Status: RESOLVED | Noted: 2021-04-10 | Resolved: 2021-05-03

## 2021-05-03 PROBLEM — E11.9 TYPE 2 DIABETES MELLITUS WITHOUT COMPLICATION, WITHOUT LONG-TERM CURRENT USE OF INSULIN: Chronic | Status: ACTIVE | Noted: 2017-08-18

## 2021-05-03 LAB
ALBUMIN SERPL BCP-MCNC: 2.5 G/DL (ref 3.5–5.2)
ALP SERPL-CCNC: 50 U/L (ref 55–135)
ALT SERPL W/O P-5'-P-CCNC: 20 U/L (ref 10–44)
ANION GAP SERPL CALC-SCNC: 6 MMOL/L (ref 8–16)
ANISOCYTOSIS BLD QL SMEAR: SLIGHT
AST SERPL-CCNC: 31 U/L (ref 10–40)
BASOPHILS # BLD AUTO: 0.03 K/UL (ref 0–0.2)
BASOPHILS NFR BLD: 0.4 % (ref 0–1.9)
BILIRUB SERPL-MCNC: 0.6 MG/DL (ref 0.1–1)
BUN SERPL-MCNC: 15 MG/DL (ref 8–23)
CALCIUM SERPL-MCNC: 8 MG/DL (ref 8.7–10.5)
CHLORIDE SERPL-SCNC: 106 MMOL/L (ref 95–110)
CO2 SERPL-SCNC: 26 MMOL/L (ref 23–29)
CREAT SERPL-MCNC: 0.9 MG/DL (ref 0.5–1.4)
CRYPTOSP AG STL QL IA: NEGATIVE
DIFFERENTIAL METHOD: ABNORMAL
E COLI SXT1 STL QL IA: NEGATIVE
E COLI SXT2 STL QL IA: NEGATIVE
EOSINOPHIL # BLD AUTO: 0.3 K/UL (ref 0–0.5)
EOSINOPHIL NFR BLD: 4.1 % (ref 0–8)
ERYTHROCYTE [DISTWIDTH] IN BLOOD BY AUTOMATED COUNT: 16.5 % (ref 11.5–14.5)
EST. GFR  (AFRICAN AMERICAN): >60 ML/MIN/1.73 M^2
EST. GFR  (NON AFRICAN AMERICAN): >60 ML/MIN/1.73 M^2
G LAMBLIA AG STL QL IA: NEGATIVE
GLUCOSE SERPL-MCNC: 75 MG/DL (ref 70–110)
HCT VFR BLD AUTO: 29.8 % (ref 40–54)
HGB BLD-MCNC: 9.6 G/DL (ref 14–18)
HYPOCHROMIA BLD QL SMEAR: ABNORMAL
IMM GRANULOCYTES # BLD AUTO: 0.06 K/UL (ref 0–0.04)
IMM GRANULOCYTES NFR BLD AUTO: 0.8 % (ref 0–0.5)
LYMPHOCYTES # BLD AUTO: 0.7 K/UL (ref 1–4.8)
LYMPHOCYTES NFR BLD: 9.1 % (ref 18–48)
MAGNESIUM SERPL-MCNC: 2.2 MG/DL (ref 1.6–2.6)
MCH RBC QN AUTO: 31.9 PG (ref 27–31)
MCHC RBC AUTO-ENTMCNC: 32.2 G/DL (ref 32–36)
MCV RBC AUTO: 99 FL (ref 82–98)
MONOCYTES # BLD AUTO: 0.7 K/UL (ref 0.3–1)
MONOCYTES NFR BLD: 10.1 % (ref 4–15)
NEUTROPHILS # BLD AUTO: 5.5 K/UL (ref 1.8–7.7)
NEUTROPHILS NFR BLD: 75.5 % (ref 38–73)
NRBC BLD-RTO: 0 /100 WBC
PHOSPHATE SERPL-MCNC: 2.6 MG/DL (ref 2.7–4.5)
PLATELET # BLD AUTO: 113 K/UL (ref 150–450)
PLATELET BLD QL SMEAR: ABNORMAL
PMV BLD AUTO: 10.2 FL (ref 9.2–12.9)
POCT GLUCOSE: 110 MG/DL (ref 70–110)
POCT GLUCOSE: 73 MG/DL (ref 70–110)
POCT GLUCOSE: 97 MG/DL (ref 70–110)
POTASSIUM SERPL-SCNC: 4.1 MMOL/L (ref 3.5–5.1)
PROT SERPL-MCNC: 5.5 G/DL (ref 6–8.4)
RBC # BLD AUTO: 3.01 M/UL (ref 4.6–6.2)
RV AG STL QL IA.RAPID: NEGATIVE
SODIUM SERPL-SCNC: 138 MMOL/L (ref 136–145)
WBC # BLD AUTO: 7.33 K/UL (ref 3.9–12.7)

## 2021-05-03 PROCEDURE — 84100 ASSAY OF PHOSPHORUS: CPT | Performed by: STUDENT IN AN ORGANIZED HEALTH CARE EDUCATION/TRAINING PROGRAM

## 2021-05-03 PROCEDURE — 25000003 PHARM REV CODE 250: Performed by: INTERNAL MEDICINE

## 2021-05-03 PROCEDURE — S0030 INJECTION, METRONIDAZOLE: HCPCS | Performed by: STUDENT IN AN ORGANIZED HEALTH CARE EDUCATION/TRAINING PROGRAM

## 2021-05-03 PROCEDURE — 83735 ASSAY OF MAGNESIUM: CPT | Performed by: STUDENT IN AN ORGANIZED HEALTH CARE EDUCATION/TRAINING PROGRAM

## 2021-05-03 PROCEDURE — 87040 BLOOD CULTURE FOR BACTERIA: CPT | Mod: 59 | Performed by: STUDENT IN AN ORGANIZED HEALTH CARE EDUCATION/TRAINING PROGRAM

## 2021-05-03 PROCEDURE — 80053 COMPREHEN METABOLIC PANEL: CPT | Performed by: STUDENT IN AN ORGANIZED HEALTH CARE EDUCATION/TRAINING PROGRAM

## 2021-05-03 PROCEDURE — 25000003 PHARM REV CODE 250: Performed by: STUDENT IN AN ORGANIZED HEALTH CARE EDUCATION/TRAINING PROGRAM

## 2021-05-03 PROCEDURE — 27000221 HC OXYGEN, UP TO 24 HOURS

## 2021-05-03 PROCEDURE — 99900035 HC TECH TIME PER 15 MIN (STAT)

## 2021-05-03 PROCEDURE — 99233 SBSQ HOSP IP/OBS HIGH 50: CPT | Mod: GC,,, | Performed by: INTERNAL MEDICINE

## 2021-05-03 PROCEDURE — 99233 PR SUBSEQUENT HOSPITAL CARE,LEVL III: ICD-10-PCS | Mod: GC,,, | Performed by: INTERNAL MEDICINE

## 2021-05-03 PROCEDURE — 63600175 PHARM REV CODE 636 W HCPCS: Performed by: STUDENT IN AN ORGANIZED HEALTH CARE EDUCATION/TRAINING PROGRAM

## 2021-05-03 PROCEDURE — 94761 N-INVAS EAR/PLS OXIMETRY MLT: CPT

## 2021-05-03 PROCEDURE — 11000001 HC ACUTE MED/SURG PRIVATE ROOM

## 2021-05-03 PROCEDURE — 85025 COMPLETE CBC W/AUTO DIFF WBC: CPT | Performed by: STUDENT IN AN ORGANIZED HEALTH CARE EDUCATION/TRAINING PROGRAM

## 2021-05-03 RX ORDER — SODIUM CHLORIDE 9 MG/ML
INJECTION, SOLUTION INTRAVENOUS
Status: DISCONTINUED | OUTPATIENT
Start: 2021-05-03 | End: 2021-05-04 | Stop reason: HOSPADM

## 2021-05-03 RX ADMIN — METRONIDAZOLE 500 MG: 500 INJECTION, SOLUTION INTRAVENOUS at 05:05

## 2021-05-03 RX ADMIN — VANCOMYCIN HYDROCHLORIDE 500 MG: KIT at 11:05

## 2021-05-03 RX ADMIN — ATORVASTATIN CALCIUM 40 MG: 20 TABLET, FILM COATED ORAL at 08:05

## 2021-05-03 RX ADMIN — ASPIRIN 81 MG: 81 TABLET, COATED ORAL at 08:05

## 2021-05-03 RX ADMIN — ALLOPURINOL 300 MG: 100 TABLET ORAL at 08:05

## 2021-05-03 RX ADMIN — VANCOMYCIN HYDROCHLORIDE 1000 MG: 1 INJECTION, POWDER, LYOPHILIZED, FOR SOLUTION INTRAVENOUS at 11:05

## 2021-05-03 RX ADMIN — SODIUM CHLORIDE: 0.9 INJECTION, SOLUTION INTRAVENOUS at 05:05

## 2021-05-03 RX ADMIN — METRONIDAZOLE 500 MG: 500 INJECTION, SOLUTION INTRAVENOUS at 03:05

## 2021-05-03 RX ADMIN — RIVAROXABAN 10 MG: 10 TABLET, FILM COATED ORAL at 06:05

## 2021-05-03 RX ADMIN — VANCOMYCIN HYDROCHLORIDE 500 MG: KIT at 06:05

## 2021-05-03 RX ADMIN — FAMOTIDINE 20 MG: 20 TABLET, FILM COATED ORAL at 08:05

## 2021-05-03 RX ADMIN — METRONIDAZOLE 500 MG: 500 INJECTION, SOLUTION INTRAVENOUS at 09:05

## 2021-05-04 VITALS
DIASTOLIC BLOOD PRESSURE: 76 MMHG | HEIGHT: 70 IN | HEART RATE: 63 BPM | WEIGHT: 219 LBS | BODY MASS INDEX: 31.35 KG/M2 | SYSTOLIC BLOOD PRESSURE: 163 MMHG | TEMPERATURE: 98 F | OXYGEN SATURATION: 93 % | RESPIRATION RATE: 20 BRPM

## 2021-05-04 PROBLEM — I82.509 CHRONIC DEEP VEIN THROMBOSIS (DVT) OF LOWER EXTREMITY: Chronic | Status: ACTIVE | Noted: 2020-12-18

## 2021-05-04 PROBLEM — A41.9 SEPTIC SHOCK: Status: RESOLVED | Noted: 2021-05-01 | Resolved: 2021-05-04

## 2021-05-04 PROBLEM — R65.21 SEPTIC SHOCK: Status: RESOLVED | Noted: 2021-05-01 | Resolved: 2021-05-04

## 2021-05-04 LAB
ALBUMIN SERPL BCP-MCNC: 2.5 G/DL (ref 3.5–5.2)
ALP SERPL-CCNC: 60 U/L (ref 55–135)
ALT SERPL W/O P-5'-P-CCNC: 18 U/L (ref 10–44)
ANION GAP SERPL CALC-SCNC: 9 MMOL/L (ref 8–16)
AST SERPL-CCNC: 25 U/L (ref 10–40)
BACTERIA BLD CULT: ABNORMAL
BASOPHILS # BLD AUTO: 0.02 K/UL (ref 0–0.2)
BASOPHILS NFR BLD: 0.2 % (ref 0–1.9)
BILIRUB SERPL-MCNC: 0.4 MG/DL (ref 0.1–1)
BUN SERPL-MCNC: 12 MG/DL (ref 8–23)
CALCIUM SERPL-MCNC: 7.8 MG/DL (ref 8.7–10.5)
CHLORIDE SERPL-SCNC: 107 MMOL/L (ref 95–110)
CO2 SERPL-SCNC: 24 MMOL/L (ref 23–29)
CREAT SERPL-MCNC: 0.8 MG/DL (ref 0.5–1.4)
DIFFERENTIAL METHOD: ABNORMAL
EOSINOPHIL # BLD AUTO: 0.4 K/UL (ref 0–0.5)
EOSINOPHIL NFR BLD: 4.6 % (ref 0–8)
ERYTHROCYTE [DISTWIDTH] IN BLOOD BY AUTOMATED COUNT: 16.3 % (ref 11.5–14.5)
EST. GFR  (AFRICAN AMERICAN): >60 ML/MIN/1.73 M^2
EST. GFR  (NON AFRICAN AMERICAN): >60 ML/MIN/1.73 M^2
GLUCOSE SERPL-MCNC: 104 MG/DL (ref 70–110)
HCT VFR BLD AUTO: 29.9 % (ref 40–54)
HGB BLD-MCNC: 9.8 G/DL (ref 14–18)
IMM GRANULOCYTES # BLD AUTO: 0.03 K/UL (ref 0–0.04)
IMM GRANULOCYTES NFR BLD AUTO: 0.4 % (ref 0–0.5)
LYMPHOCYTES # BLD AUTO: 0.8 K/UL (ref 1–4.8)
LYMPHOCYTES NFR BLD: 9.6 % (ref 18–48)
MAGNESIUM SERPL-MCNC: 1.7 MG/DL (ref 1.6–2.6)
MCH RBC QN AUTO: 31.8 PG (ref 27–31)
MCHC RBC AUTO-ENTMCNC: 32.8 G/DL (ref 32–36)
MCV RBC AUTO: 97 FL (ref 82–98)
MONOCYTES # BLD AUTO: 0.7 K/UL (ref 0.3–1)
MONOCYTES NFR BLD: 8 % (ref 4–15)
NEUTROPHILS # BLD AUTO: 6.5 K/UL (ref 1.8–7.7)
NEUTROPHILS NFR BLD: 77.2 % (ref 38–73)
NRBC BLD-RTO: 0 /100 WBC
O+P STL MICRO: NORMAL
PHOSPHATE SERPL-MCNC: 1.9 MG/DL (ref 2.7–4.5)
PLATELET # BLD AUTO: 132 K/UL (ref 150–450)
PLATELET BLD QL SMEAR: ABNORMAL
PMV BLD AUTO: 10.3 FL (ref 9.2–12.9)
POTASSIUM SERPL-SCNC: 3.2 MMOL/L (ref 3.5–5.1)
PROT SERPL-MCNC: 5.6 G/DL (ref 6–8.4)
RBC # BLD AUTO: 3.08 M/UL (ref 4.6–6.2)
SODIUM SERPL-SCNC: 140 MMOL/L (ref 136–145)
WBC # BLD AUTO: 8.42 K/UL (ref 3.9–12.7)

## 2021-05-04 PROCEDURE — 25000003 PHARM REV CODE 250: Performed by: HOSPITALIST

## 2021-05-04 PROCEDURE — S0030 INJECTION, METRONIDAZOLE: HCPCS | Performed by: STUDENT IN AN ORGANIZED HEALTH CARE EDUCATION/TRAINING PROGRAM

## 2021-05-04 PROCEDURE — 84100 ASSAY OF PHOSPHORUS: CPT | Performed by: STUDENT IN AN ORGANIZED HEALTH CARE EDUCATION/TRAINING PROGRAM

## 2021-05-04 PROCEDURE — 80053 COMPREHEN METABOLIC PANEL: CPT | Performed by: STUDENT IN AN ORGANIZED HEALTH CARE EDUCATION/TRAINING PROGRAM

## 2021-05-04 PROCEDURE — 63600175 PHARM REV CODE 636 W HCPCS: Performed by: STUDENT IN AN ORGANIZED HEALTH CARE EDUCATION/TRAINING PROGRAM

## 2021-05-04 PROCEDURE — 36415 COLL VENOUS BLD VENIPUNCTURE: CPT | Performed by: STUDENT IN AN ORGANIZED HEALTH CARE EDUCATION/TRAINING PROGRAM

## 2021-05-04 PROCEDURE — 99239 PR HOSPITAL DISCHARGE DAY,>30 MIN: ICD-10-PCS | Mod: ,,, | Performed by: HOSPITALIST

## 2021-05-04 PROCEDURE — 25000003 PHARM REV CODE 250: Performed by: STUDENT IN AN ORGANIZED HEALTH CARE EDUCATION/TRAINING PROGRAM

## 2021-05-04 PROCEDURE — 83735 ASSAY OF MAGNESIUM: CPT | Performed by: STUDENT IN AN ORGANIZED HEALTH CARE EDUCATION/TRAINING PROGRAM

## 2021-05-04 PROCEDURE — 99239 HOSP IP/OBS DSCHRG MGMT >30: CPT | Mod: ,,, | Performed by: HOSPITALIST

## 2021-05-04 PROCEDURE — 97161 PT EVAL LOW COMPLEX 20 MIN: CPT

## 2021-05-04 PROCEDURE — 97165 OT EVAL LOW COMPLEX 30 MIN: CPT

## 2021-05-04 PROCEDURE — 85025 COMPLETE CBC W/AUTO DIFF WBC: CPT | Performed by: STUDENT IN AN ORGANIZED HEALTH CARE EDUCATION/TRAINING PROGRAM

## 2021-05-04 RX ORDER — ESCITALOPRAM OXALATE 10 MG/1
10 TABLET ORAL DAILY
Status: DISCONTINUED | OUTPATIENT
Start: 2021-05-04 | End: 2021-05-04 | Stop reason: HOSPADM

## 2021-05-04 RX ORDER — ACETAMINOPHEN 325 MG/1
650 TABLET ORAL EVERY 6 HOURS PRN
Status: DISCONTINUED | OUTPATIENT
Start: 2021-05-04 | End: 2021-05-04 | Stop reason: HOSPADM

## 2021-05-04 RX ORDER — CALCIUM CARBONATE 200(500)MG
500 TABLET,CHEWABLE ORAL 3 TIMES DAILY PRN
Status: DISCONTINUED | OUTPATIENT
Start: 2021-05-04 | End: 2021-05-04 | Stop reason: HOSPADM

## 2021-05-04 RX ORDER — SOTALOL HYDROCHLORIDE 80 MG/1
80 TABLET ORAL EVERY 12 HOURS
Status: DISCONTINUED | OUTPATIENT
Start: 2021-05-04 | End: 2021-05-04 | Stop reason: HOSPADM

## 2021-05-04 RX ORDER — SERTRALINE HYDROCHLORIDE 50 MG/1
100 TABLET, FILM COATED ORAL NIGHTLY
Status: DISCONTINUED | OUTPATIENT
Start: 2021-05-04 | End: 2021-05-04 | Stop reason: HOSPADM

## 2021-05-04 RX ADMIN — ASPIRIN 81 MG: 81 TABLET, COATED ORAL at 09:05

## 2021-05-04 RX ADMIN — METRONIDAZOLE 500 MG: 500 INJECTION, SOLUTION INTRAVENOUS at 09:05

## 2021-05-04 RX ADMIN — SOTALOL HYDROCHLORIDE 80 MG: 80 TABLET ORAL at 09:05

## 2021-05-04 RX ADMIN — ALLOPURINOL 300 MG: 100 TABLET ORAL at 06:05

## 2021-05-04 RX ADMIN — VANCOMYCIN HYDROCHLORIDE 1000 MG: 1 INJECTION, POWDER, LYOPHILIZED, FOR SOLUTION INTRAVENOUS at 12:05

## 2021-05-04 RX ADMIN — VANCOMYCIN HYDROCHLORIDE 500 MG: KIT at 06:05

## 2021-05-04 RX ADMIN — METRONIDAZOLE 500 MG: 500 INJECTION, SOLUTION INTRAVENOUS at 01:05

## 2021-05-04 RX ADMIN — VANCOMYCIN HYDROCHLORIDE 500 MG: KIT at 12:05

## 2021-05-04 RX ADMIN — DIPHENHYDRAMINE HYDROCHLORIDE 25 MG: 25 CAPSULE ORAL at 12:05

## 2021-05-04 RX ADMIN — ESCITALOPRAM OXALATE 10 MG: 10 TABLET ORAL at 09:05

## 2021-05-05 LAB — BACTERIA STL CULT: NORMAL

## 2021-05-06 ENCOUNTER — PATIENT OUTREACH (OUTPATIENT)
Dept: ADMINISTRATIVE | Facility: CLINIC | Age: 81
End: 2021-05-06

## 2021-05-06 ENCOUNTER — PATIENT MESSAGE (OUTPATIENT)
Dept: ADMINISTRATIVE | Facility: CLINIC | Age: 81
End: 2021-05-06

## 2021-05-06 LAB — BACTERIA BLD CULT: NORMAL

## 2021-05-08 LAB
BACTERIA BLD CULT: NORMAL
BACTERIA BLD CULT: NORMAL

## 2021-05-10 ENCOUNTER — TELEPHONE (OUTPATIENT)
Dept: SURGERY | Facility: CLINIC | Age: 81
End: 2021-05-10

## 2021-05-11 ENCOUNTER — LAB VISIT (OUTPATIENT)
Dept: LAB | Facility: HOSPITAL | Age: 81
End: 2021-05-11
Attending: INTERNAL MEDICINE
Payer: MEDICARE

## 2021-05-11 DIAGNOSIS — D50.9 IRON DEFICIENCY ANEMIA, UNSPECIFIED IRON DEFICIENCY ANEMIA TYPE: ICD-10-CM

## 2021-05-11 LAB
FERRITIN SERPL-MCNC: 140 NG/ML (ref 20–300)
HGB BLD-MCNC: 11.2 G/DL (ref 14–18)
IRON SERPL-MCNC: 87 UG/DL (ref 45–160)
SATURATED IRON: 32 % (ref 20–50)
TOTAL IRON BINDING CAPACITY: 272 UG/DL (ref 250–450)
TRANSFERRIN SERPL-MCNC: 184 MG/DL (ref 200–375)

## 2021-05-11 PROCEDURE — 36415 COLL VENOUS BLD VENIPUNCTURE: CPT | Performed by: INTERNAL MEDICINE

## 2021-05-11 PROCEDURE — 82728 ASSAY OF FERRITIN: CPT | Performed by: INTERNAL MEDICINE

## 2021-05-11 PROCEDURE — 83540 ASSAY OF IRON: CPT | Performed by: INTERNAL MEDICINE

## 2021-05-11 PROCEDURE — 85018 HEMOGLOBIN: CPT | Performed by: INTERNAL MEDICINE

## 2021-05-13 ENCOUNTER — OFFICE VISIT (OUTPATIENT)
Dept: SURGERY | Facility: CLINIC | Age: 81
End: 2021-05-13
Payer: MEDICARE

## 2021-05-13 VITALS
DIASTOLIC BLOOD PRESSURE: 80 MMHG | HEIGHT: 70 IN | SYSTOLIC BLOOD PRESSURE: 146 MMHG | WEIGHT: 211.88 LBS | HEART RATE: 80 BPM | BODY MASS INDEX: 30.33 KG/M2

## 2021-05-13 DIAGNOSIS — C18.7 MALIGNANT NEOPLASM OF SIGMOID COLON: Primary | ICD-10-CM

## 2021-05-13 PROCEDURE — 99999 PR PBB SHADOW E&M-EST. PATIENT-LVL IV: ICD-10-PCS | Mod: PBBFAC,,, | Performed by: COLON & RECTAL SURGERY

## 2021-05-13 PROCEDURE — 99024 POSTOP FOLLOW-UP VISIT: CPT | Mod: S$GLB,,, | Performed by: COLON & RECTAL SURGERY

## 2021-05-13 PROCEDURE — 99999 PR PBB SHADOW E&M-EST. PATIENT-LVL IV: CPT | Mod: PBBFAC,,, | Performed by: COLON & RECTAL SURGERY

## 2021-05-13 PROCEDURE — 3288F PR FALLS RISK ASSESSMENT DOCUMENTED: ICD-10-PCS | Mod: CPTII,S$GLB,, | Performed by: COLON & RECTAL SURGERY

## 2021-05-13 PROCEDURE — 1101F PT FALLS ASSESS-DOCD LE1/YR: CPT | Mod: CPTII,S$GLB,, | Performed by: COLON & RECTAL SURGERY

## 2021-05-13 PROCEDURE — 1126F PR PAIN SEVERITY QUANTIFIED, NO PAIN PRESENT: ICD-10-PCS | Mod: S$GLB,,, | Performed by: COLON & RECTAL SURGERY

## 2021-05-13 PROCEDURE — 99024 PR POST-OP FOLLOW-UP VISIT: ICD-10-PCS | Mod: S$GLB,,, | Performed by: COLON & RECTAL SURGERY

## 2021-05-13 PROCEDURE — 1101F PR PT FALLS ASSESS DOC 0-1 FALLS W/OUT INJ PAST YR: ICD-10-PCS | Mod: CPTII,S$GLB,, | Performed by: COLON & RECTAL SURGERY

## 2021-05-13 PROCEDURE — 1126F AMNT PAIN NOTED NONE PRSNT: CPT | Mod: S$GLB,,, | Performed by: COLON & RECTAL SURGERY

## 2021-05-13 PROCEDURE — 3288F FALL RISK ASSESSMENT DOCD: CPT | Mod: CPTII,S$GLB,, | Performed by: COLON & RECTAL SURGERY

## 2021-05-17 ENCOUNTER — PATIENT MESSAGE (OUTPATIENT)
Dept: RHEUMATOLOGY | Facility: CLINIC | Age: 81
End: 2021-05-17

## 2021-05-19 ENCOUNTER — OFFICE VISIT (OUTPATIENT)
Dept: HEMATOLOGY/ONCOLOGY | Facility: CLINIC | Age: 81
End: 2021-05-19
Payer: MEDICARE

## 2021-05-19 ENCOUNTER — LAB VISIT (OUTPATIENT)
Dept: LAB | Facility: HOSPITAL | Age: 81
End: 2021-05-19
Attending: INTERNAL MEDICINE
Payer: MEDICARE

## 2021-05-19 VITALS
DIASTOLIC BLOOD PRESSURE: 72 MMHG | OXYGEN SATURATION: 96 % | HEIGHT: 70 IN | SYSTOLIC BLOOD PRESSURE: 131 MMHG | WEIGHT: 213.38 LBS | HEART RATE: 67 BPM | BODY MASS INDEX: 30.55 KG/M2

## 2021-05-19 DIAGNOSIS — A49.8 CLOSTRIDIUM DIFFICILE INFECTION: ICD-10-CM

## 2021-05-19 DIAGNOSIS — I10 HYPERTENSION, UNSPECIFIED TYPE: ICD-10-CM

## 2021-05-19 DIAGNOSIS — C18.7 MALIGNANT NEOPLASM OF SIGMOID COLON: ICD-10-CM

## 2021-05-19 DIAGNOSIS — D50.0 IRON DEFICIENCY ANEMIA DUE TO CHRONIC BLOOD LOSS: ICD-10-CM

## 2021-05-19 DIAGNOSIS — E11.9 TYPE 2 DIABETES MELLITUS WITHOUT COMPLICATION, WITHOUT LONG-TERM CURRENT USE OF INSULIN: Chronic | ICD-10-CM

## 2021-05-19 DIAGNOSIS — I82.5Z9 CHRONIC DEEP VEIN THROMBOSIS (DVT) OF DISTAL VEIN OF LOWER EXTREMITY, UNSPECIFIED LATERALITY: Chronic | ICD-10-CM

## 2021-05-19 DIAGNOSIS — M85.89 OSTEOPENIA OF MULTIPLE SITES: ICD-10-CM

## 2021-05-19 DIAGNOSIS — C18.7 MALIGNANT NEOPLASM OF SIGMOID COLON: Primary | ICD-10-CM

## 2021-05-19 DIAGNOSIS — D51.0 PERNICIOUS ANEMIA: ICD-10-CM

## 2021-05-19 LAB
ALBUMIN SERPL BCP-MCNC: 3.3 G/DL (ref 3.5–5.2)
ALP SERPL-CCNC: 55 U/L (ref 55–135)
ALT SERPL W/O P-5'-P-CCNC: 25 U/L (ref 10–44)
ANION GAP SERPL CALC-SCNC: 8 MMOL/L (ref 8–16)
AST SERPL-CCNC: 31 U/L (ref 10–40)
BASOPHILS # BLD AUTO: 0.04 K/UL (ref 0–0.2)
BASOPHILS NFR BLD: 0.7 % (ref 0–1.9)
BILIRUB SERPL-MCNC: 0.7 MG/DL (ref 0.1–1)
BUN SERPL-MCNC: 9 MG/DL (ref 8–23)
CALCIUM SERPL-MCNC: 8.8 MG/DL (ref 8.7–10.5)
CHLORIDE SERPL-SCNC: 106 MMOL/L (ref 95–110)
CO2 SERPL-SCNC: 30 MMOL/L (ref 23–29)
CREAT SERPL-MCNC: 1 MG/DL (ref 0.5–1.4)
DIFFERENTIAL METHOD: ABNORMAL
EOSINOPHIL # BLD AUTO: 0.2 K/UL (ref 0–0.5)
EOSINOPHIL NFR BLD: 2.6 % (ref 0–8)
ERYTHROCYTE [DISTWIDTH] IN BLOOD BY AUTOMATED COUNT: 16 % (ref 11.5–14.5)
EST. GFR  (AFRICAN AMERICAN): >60 ML/MIN/1.73 M^2
EST. GFR  (NON AFRICAN AMERICAN): >60 ML/MIN/1.73 M^2
GLUCOSE SERPL-MCNC: 96 MG/DL (ref 70–110)
HCT VFR BLD AUTO: 35.3 % (ref 40–54)
HGB BLD-MCNC: 11.4 G/DL (ref 14–18)
IMM GRANULOCYTES # BLD AUTO: 0.01 K/UL (ref 0–0.04)
IMM GRANULOCYTES NFR BLD AUTO: 0.2 % (ref 0–0.5)
LYMPHOCYTES # BLD AUTO: 1.5 K/UL (ref 1–4.8)
LYMPHOCYTES NFR BLD: 26.5 % (ref 18–48)
MCH RBC QN AUTO: 31.6 PG (ref 27–31)
MCHC RBC AUTO-ENTMCNC: 32.3 G/DL (ref 32–36)
MCV RBC AUTO: 98 FL (ref 82–98)
MONOCYTES # BLD AUTO: 0.5 K/UL (ref 0.3–1)
MONOCYTES NFR BLD: 9.5 % (ref 4–15)
NEUTROPHILS # BLD AUTO: 3.4 K/UL (ref 1.8–7.7)
NEUTROPHILS NFR BLD: 60.5 % (ref 38–73)
NRBC BLD-RTO: 0 /100 WBC
PLATELET # BLD AUTO: 185 K/UL (ref 150–450)
PMV BLD AUTO: 8.8 FL (ref 9.2–12.9)
POTASSIUM SERPL-SCNC: 4.8 MMOL/L (ref 3.5–5.1)
PROT SERPL-MCNC: 6.7 G/DL (ref 6–8.4)
RBC # BLD AUTO: 3.61 M/UL (ref 4.6–6.2)
SODIUM SERPL-SCNC: 144 MMOL/L (ref 136–145)
WBC # BLD AUTO: 5.67 K/UL (ref 3.9–12.7)

## 2021-05-19 PROCEDURE — 1126F AMNT PAIN NOTED NONE PRSNT: CPT | Mod: S$GLB,,, | Performed by: INTERNAL MEDICINE

## 2021-05-19 PROCEDURE — 1159F MED LIST DOCD IN RCRD: CPT | Mod: S$GLB,,, | Performed by: INTERNAL MEDICINE

## 2021-05-19 PROCEDURE — 1101F PR PT FALLS ASSESS DOC 0-1 FALLS W/OUT INJ PAST YR: ICD-10-PCS | Mod: CPTII,S$GLB,, | Performed by: INTERNAL MEDICINE

## 2021-05-19 PROCEDURE — 99999 PR PBB SHADOW E&M-EST. PATIENT-LVL IV: CPT | Mod: PBBFAC,,, | Performed by: INTERNAL MEDICINE

## 2021-05-19 PROCEDURE — 85025 COMPLETE CBC W/AUTO DIFF WBC: CPT | Performed by: INTERNAL MEDICINE

## 2021-05-19 PROCEDURE — 1126F PR PAIN SEVERITY QUANTIFIED, NO PAIN PRESENT: ICD-10-PCS | Mod: S$GLB,,, | Performed by: INTERNAL MEDICINE

## 2021-05-19 PROCEDURE — 36415 COLL VENOUS BLD VENIPUNCTURE: CPT | Performed by: INTERNAL MEDICINE

## 2021-05-19 PROCEDURE — 99999 PR PBB SHADOW E&M-EST. PATIENT-LVL IV: ICD-10-PCS | Mod: PBBFAC,,, | Performed by: INTERNAL MEDICINE

## 2021-05-19 PROCEDURE — 3288F FALL RISK ASSESSMENT DOCD: CPT | Mod: CPTII,S$GLB,, | Performed by: INTERNAL MEDICINE

## 2021-05-19 PROCEDURE — 99205 OFFICE O/P NEW HI 60 MIN: CPT | Mod: S$GLB,,, | Performed by: INTERNAL MEDICINE

## 2021-05-19 PROCEDURE — 99205 PR OFFICE/OUTPT VISIT, NEW, LEVL V, 60-74 MIN: ICD-10-PCS | Mod: S$GLB,,, | Performed by: INTERNAL MEDICINE

## 2021-05-19 PROCEDURE — 3288F PR FALLS RISK ASSESSMENT DOCUMENTED: ICD-10-PCS | Mod: CPTII,S$GLB,, | Performed by: INTERNAL MEDICINE

## 2021-05-19 PROCEDURE — 1159F PR MEDICATION LIST DOCUMENTED IN MEDICAL RECORD: ICD-10-PCS | Mod: S$GLB,,, | Performed by: INTERNAL MEDICINE

## 2021-05-19 PROCEDURE — 1101F PT FALLS ASSESS-DOCD LE1/YR: CPT | Mod: CPTII,S$GLB,, | Performed by: INTERNAL MEDICINE

## 2021-05-19 PROCEDURE — 80053 COMPREHEN METABOLIC PANEL: CPT | Performed by: INTERNAL MEDICINE

## 2021-05-23 ENCOUNTER — PATIENT MESSAGE (OUTPATIENT)
Dept: HEMATOLOGY/ONCOLOGY | Facility: CLINIC | Age: 81
End: 2021-05-23

## 2021-05-28 DIAGNOSIS — D50.0 IRON DEFICIENCY ANEMIA DUE TO CHRONIC BLOOD LOSS: Primary | ICD-10-CM

## 2021-05-28 RX ORDER — FERROUS SULFATE 325(65) MG
325 TABLET ORAL EVERY 12 HOURS
Qty: 60 TABLET | Refills: 4 | Status: SHIPPED | OUTPATIENT
Start: 2021-05-28 | End: 2022-03-07

## 2021-05-31 ENCOUNTER — TELEPHONE (OUTPATIENT)
Dept: GASTROENTEROLOGY | Facility: CLINIC | Age: 81
End: 2021-05-31

## 2021-07-08 ENCOUNTER — PATIENT MESSAGE (OUTPATIENT)
Dept: SURGERY | Facility: CLINIC | Age: 81
End: 2021-07-08

## 2021-07-12 DIAGNOSIS — C18.7 MALIGNANT NEOPLASM OF SIGMOID COLON: Primary | ICD-10-CM

## 2021-07-13 ENCOUNTER — TELEPHONE (OUTPATIENT)
Dept: SURGERY | Facility: CLINIC | Age: 81
End: 2021-07-13

## 2021-07-13 DIAGNOSIS — M85.89 OSTEOPENIA OF MULTIPLE SITES: ICD-10-CM

## 2021-07-14 RX ORDER — ALENDRONATE SODIUM 70 MG/1
TABLET ORAL
Qty: 4 TABLET | Refills: 1 | OUTPATIENT
Start: 2021-07-14

## 2021-07-19 PROBLEM — G89.18 POSTOPERATIVE ABDOMINAL PAIN WITH FEVER: Status: RESOLVED | Noted: 2021-04-11 | Resolved: 2021-07-19

## 2021-07-19 PROBLEM — R50.82 POSTOPERATIVE ABDOMINAL PAIN WITH FEVER: Status: RESOLVED | Noted: 2021-04-11 | Resolved: 2021-07-19

## 2021-07-19 PROBLEM — R10.9 POSTOPERATIVE ABDOMINAL PAIN WITH FEVER: Status: RESOLVED | Noted: 2021-04-11 | Resolved: 2021-07-19

## 2021-07-29 ENCOUNTER — PATIENT MESSAGE (OUTPATIENT)
Dept: SURGERY | Facility: CLINIC | Age: 81
End: 2021-07-29

## 2021-08-13 ENCOUNTER — PATIENT MESSAGE (OUTPATIENT)
Dept: GASTROENTEROLOGY | Facility: CLINIC | Age: 81
End: 2021-08-13

## 2021-08-15 ENCOUNTER — PATIENT MESSAGE (OUTPATIENT)
Dept: GASTROENTEROLOGY | Facility: CLINIC | Age: 81
End: 2021-08-15

## 2021-08-15 DIAGNOSIS — D50.9 IRON DEFICIENCY ANEMIA, UNSPECIFIED IRON DEFICIENCY ANEMIA TYPE: Primary | ICD-10-CM

## 2021-08-16 ENCOUNTER — TELEPHONE (OUTPATIENT)
Dept: ENDOSCOPY | Facility: HOSPITAL | Age: 81
End: 2021-08-16

## 2021-08-19 ENCOUNTER — TELEPHONE (OUTPATIENT)
Dept: ENDOSCOPY | Facility: HOSPITAL | Age: 81
End: 2021-08-19

## 2021-08-25 ENCOUNTER — OFFICE VISIT (OUTPATIENT)
Dept: HEMATOLOGY/ONCOLOGY | Facility: CLINIC | Age: 81
End: 2021-08-25
Payer: MEDICARE

## 2021-08-25 VITALS
DIASTOLIC BLOOD PRESSURE: 70 MMHG | TEMPERATURE: 98 F | WEIGHT: 212.06 LBS | BODY MASS INDEX: 30.36 KG/M2 | OXYGEN SATURATION: 95 % | HEIGHT: 70 IN | HEART RATE: 60 BPM | SYSTOLIC BLOOD PRESSURE: 123 MMHG

## 2021-08-25 DIAGNOSIS — F32.A DEPRESSION, UNSPECIFIED DEPRESSION TYPE: ICD-10-CM

## 2021-08-25 DIAGNOSIS — Z12.89 ENCOUNTER FOR SCREENING FOR MALIGNANT NEOPLASM OF OTHER SITES: ICD-10-CM

## 2021-08-25 DIAGNOSIS — D51.0 PERNICIOUS ANEMIA: ICD-10-CM

## 2021-08-25 DIAGNOSIS — I82.5Z9 CHRONIC DEEP VEIN THROMBOSIS (DVT) OF DISTAL VEIN OF LOWER EXTREMITY, UNSPECIFIED LATERALITY: Chronic | ICD-10-CM

## 2021-08-25 DIAGNOSIS — R53.81 DEBILITY: ICD-10-CM

## 2021-08-25 DIAGNOSIS — C18.7 MALIGNANT NEOPLASM OF SIGMOID COLON: Primary | ICD-10-CM

## 2021-08-25 DIAGNOSIS — R19.7 DIARRHEA, UNSPECIFIED TYPE: ICD-10-CM

## 2021-08-25 PROCEDURE — 1126F AMNT PAIN NOTED NONE PRSNT: CPT | Mod: CPTII,S$GLB,, | Performed by: INTERNAL MEDICINE

## 2021-08-25 PROCEDURE — 1100F PR PT FALLS ASSESS DOC 2+ FALLS/FALL W/INJURY/YR: ICD-10-PCS | Mod: CPTII,S$GLB,, | Performed by: INTERNAL MEDICINE

## 2021-08-25 PROCEDURE — 3074F PR MOST RECENT SYSTOLIC BLOOD PRESSURE < 130 MM HG: ICD-10-PCS | Mod: CPTII,S$GLB,, | Performed by: INTERNAL MEDICINE

## 2021-08-25 PROCEDURE — 99215 PR OFFICE/OUTPT VISIT, EST, LEVL V, 40-54 MIN: ICD-10-PCS | Mod: S$GLB,,, | Performed by: INTERNAL MEDICINE

## 2021-08-25 PROCEDURE — 3074F SYST BP LT 130 MM HG: CPT | Mod: CPTII,S$GLB,, | Performed by: INTERNAL MEDICINE

## 2021-08-25 PROCEDURE — 3078F DIAST BP <80 MM HG: CPT | Mod: CPTII,S$GLB,, | Performed by: INTERNAL MEDICINE

## 2021-08-25 PROCEDURE — 1100F PTFALLS ASSESS-DOCD GE2>/YR: CPT | Mod: CPTII,S$GLB,, | Performed by: INTERNAL MEDICINE

## 2021-08-25 PROCEDURE — 3288F FALL RISK ASSESSMENT DOCD: CPT | Mod: CPTII,S$GLB,, | Performed by: INTERNAL MEDICINE

## 2021-08-25 PROCEDURE — 99999 PR PBB SHADOW E&M-EST. PATIENT-LVL V: ICD-10-PCS | Mod: PBBFAC,,, | Performed by: INTERNAL MEDICINE

## 2021-08-25 PROCEDURE — 1126F PR PAIN SEVERITY QUANTIFIED, NO PAIN PRESENT: ICD-10-PCS | Mod: CPTII,S$GLB,, | Performed by: INTERNAL MEDICINE

## 2021-08-25 PROCEDURE — 3078F PR MOST RECENT DIASTOLIC BLOOD PRESSURE < 80 MM HG: ICD-10-PCS | Mod: CPTII,S$GLB,, | Performed by: INTERNAL MEDICINE

## 2021-08-25 PROCEDURE — 1159F PR MEDICATION LIST DOCUMENTED IN MEDICAL RECORD: ICD-10-PCS | Mod: CPTII,S$GLB,, | Performed by: INTERNAL MEDICINE

## 2021-08-25 PROCEDURE — 3288F PR FALLS RISK ASSESSMENT DOCUMENTED: ICD-10-PCS | Mod: CPTII,S$GLB,, | Performed by: INTERNAL MEDICINE

## 2021-08-25 PROCEDURE — 99215 OFFICE O/P EST HI 40 MIN: CPT | Mod: S$GLB,,, | Performed by: INTERNAL MEDICINE

## 2021-08-25 PROCEDURE — 99999 PR PBB SHADOW E&M-EST. PATIENT-LVL V: CPT | Mod: PBBFAC,,, | Performed by: INTERNAL MEDICINE

## 2021-08-25 PROCEDURE — 1159F MED LIST DOCD IN RCRD: CPT | Mod: CPTII,S$GLB,, | Performed by: INTERNAL MEDICINE

## 2021-08-25 RX ORDER — DIPHENHYDRAMINE HCL 2 %
25 CREAM (GRAM) TOPICAL NIGHTLY PRN
COMMUNITY
Start: 2021-08-18

## 2021-08-25 RX ORDER — LOPERAMIDE HYDROCHLORIDE 2 MG/1
2 TABLET, FILM COATED ORAL 2 TIMES DAILY
COMMUNITY
Start: 2021-08-18

## 2021-08-25 RX ORDER — BLACK COHOSH ROOT 200 MG
250 CAPSULE ORAL 2 TIMES DAILY
COMMUNITY
Start: 2021-08-18

## 2021-08-26 PROBLEM — R53.81 DEBILITY: Status: ACTIVE | Noted: 2021-08-26

## 2021-08-26 PROBLEM — F32.A DEPRESSION: Status: ACTIVE | Noted: 2021-08-26

## 2021-10-04 ENCOUNTER — PATIENT MESSAGE (OUTPATIENT)
Dept: SURGERY | Facility: CLINIC | Age: 81
End: 2021-10-04

## 2021-10-05 ENCOUNTER — TELEPHONE (OUTPATIENT)
Dept: SURGERY | Facility: CLINIC | Age: 81
End: 2021-10-05

## 2021-10-05 DIAGNOSIS — C18.7 MALIGNANT NEOPLASM OF SIGMOID COLON: Primary | ICD-10-CM

## 2021-10-05 DIAGNOSIS — M54.6 ACUTE THORACIC BACK PAIN, UNSPECIFIED BACK PAIN LATERALITY: ICD-10-CM

## 2021-10-06 ENCOUNTER — TELEPHONE (OUTPATIENT)
Dept: SURGERY | Facility: CLINIC | Age: 81
End: 2021-10-06

## 2021-10-06 DIAGNOSIS — M54.9 DORSALGIA, UNSPECIFIED: ICD-10-CM

## 2021-10-06 DIAGNOSIS — Z12.89 ENCOUNTER FOR SCREENING FOR MALIGNANT NEOPLASM OF OTHER SITES: ICD-10-CM

## 2021-10-06 DIAGNOSIS — M54.6 ACUTE THORACIC BACK PAIN, UNSPECIFIED BACK PAIN LATERALITY: Primary | ICD-10-CM

## 2021-12-01 ENCOUNTER — OFFICE VISIT (OUTPATIENT)
Dept: HEMATOLOGY/ONCOLOGY | Facility: CLINIC | Age: 81
End: 2021-12-01
Payer: MEDICARE

## 2021-12-01 VITALS
OXYGEN SATURATION: 96 % | BODY MASS INDEX: 30.76 KG/M2 | HEART RATE: 62 BPM | WEIGHT: 214.38 LBS | SYSTOLIC BLOOD PRESSURE: 124 MMHG | DIASTOLIC BLOOD PRESSURE: 75 MMHG

## 2021-12-01 DIAGNOSIS — C18.7 MALIGNANT NEOPLASM OF SIGMOID COLON: Primary | ICD-10-CM

## 2021-12-01 DIAGNOSIS — F32.A DEPRESSION, UNSPECIFIED DEPRESSION TYPE: ICD-10-CM

## 2021-12-01 DIAGNOSIS — I82.5Z9 CHRONIC DEEP VEIN THROMBOSIS (DVT) OF DISTAL VEIN OF LOWER EXTREMITY, UNSPECIFIED LATERALITY: Chronic | ICD-10-CM

## 2021-12-01 DIAGNOSIS — D51.0 PERNICIOUS ANEMIA: ICD-10-CM

## 2021-12-01 PROCEDURE — 99215 PR OFFICE/OUTPT VISIT, EST, LEVL V, 40-54 MIN: ICD-10-PCS | Mod: S$GLB,,, | Performed by: INTERNAL MEDICINE

## 2021-12-01 PROCEDURE — 99999 PR PBB SHADOW E&M-EST. PATIENT-LVL V: ICD-10-PCS | Mod: PBBFAC,,, | Performed by: INTERNAL MEDICINE

## 2021-12-01 PROCEDURE — 99215 OFFICE O/P EST HI 40 MIN: CPT | Mod: S$GLB,,, | Performed by: INTERNAL MEDICINE

## 2021-12-01 PROCEDURE — 99999 PR PBB SHADOW E&M-EST. PATIENT-LVL V: CPT | Mod: PBBFAC,,, | Performed by: INTERNAL MEDICINE

## 2021-12-14 ENCOUNTER — OFFICE VISIT (OUTPATIENT)
Dept: PSYCHIATRY | Facility: CLINIC | Age: 81
End: 2021-12-14
Payer: MEDICARE

## 2021-12-14 DIAGNOSIS — F33.1 MODERATE EPISODE OF RECURRENT MAJOR DEPRESSIVE DISORDER: Primary | ICD-10-CM

## 2021-12-14 DIAGNOSIS — F32.A DEPRESSION, UNSPECIFIED DEPRESSION TYPE: ICD-10-CM

## 2021-12-14 PROCEDURE — 90791 PR PSYCHIATRIC DIAGNOSTIC EVALUATION: ICD-10-PCS | Mod: S$GLB,,, | Performed by: PSYCHOLOGIST

## 2021-12-14 PROCEDURE — 99999 PR PBB SHADOW E&M-EST. PATIENT-LVL II: CPT | Mod: PBBFAC,,, | Performed by: PSYCHOLOGIST

## 2021-12-14 PROCEDURE — 99999 PR PBB SHADOW E&M-EST. PATIENT-LVL II: ICD-10-PCS | Mod: PBBFAC,,, | Performed by: PSYCHOLOGIST

## 2021-12-14 PROCEDURE — 90791 PSYCH DIAGNOSTIC EVALUATION: CPT | Mod: S$GLB,,, | Performed by: PSYCHOLOGIST

## 2021-12-17 PROBLEM — F33.1 MODERATE EPISODE OF RECURRENT MAJOR DEPRESSIVE DISORDER: Status: ACTIVE | Noted: 2021-08-26

## 2021-12-29 ENCOUNTER — TELEPHONE (OUTPATIENT)
Dept: INFUSION THERAPY | Facility: HOSPITAL | Age: 81
End: 2021-12-29
Payer: MEDICARE

## 2021-12-29 ENCOUNTER — TELEPHONE (OUTPATIENT)
Dept: PSYCHIATRY | Facility: CLINIC | Age: 81
End: 2021-12-29
Payer: MEDICARE

## 2022-01-04 ENCOUNTER — TELEPHONE (OUTPATIENT)
Dept: PSYCHIATRY | Facility: CLINIC | Age: 82
End: 2022-01-04
Payer: MEDICARE

## 2022-01-04 NOTE — TELEPHONE ENCOUNTER
"Message returned from patient's wife. They are concerned that his new antidepressant (Miratzepine 15 mg) is not working. She does recognize that onset takes several weeks. They would like to set a psychiatry appt for medication management. SHe is also concerned that Mr. 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  Will contact psychiatry for appt.  Next appt with me 1/21/22.  DANTE Lantigua, PhD  "

## 2022-01-06 ENCOUNTER — TELEPHONE (OUTPATIENT)
Dept: INFUSION THERAPY | Facility: HOSPITAL | Age: 82
End: 2022-01-06
Payer: MEDICARE

## 2022-01-10 ENCOUNTER — TELEPHONE (OUTPATIENT)
Dept: INFUSION THERAPY | Facility: HOSPITAL | Age: 82
End: 2022-01-10
Payer: MEDICARE

## 2022-03-02 ENCOUNTER — TELEPHONE (OUTPATIENT)
Dept: ENDOSCOPY | Facility: HOSPITAL | Age: 82
End: 2022-03-02

## 2022-03-02 NOTE — TELEPHONE ENCOUNTER
----- Message from Selina Galloway sent at 3/2/2022  9:00 AM CST -----  Regarding: questions  Contact: [phone]  Pt Questions    Questions: Pt calling to speak with the dr about getting an colonoscopy   Call Back number: [phone]

## 2022-03-07 DIAGNOSIS — K31.A0 GASTRIC INTESTINAL METAPLASIA: ICD-10-CM

## 2022-03-07 DIAGNOSIS — C18.7 ADENOCARCINOMA OF SIGMOID COLON: Primary | ICD-10-CM

## 2022-03-10 ENCOUNTER — TELEPHONE (OUTPATIENT)
Dept: ENDOSCOPY | Facility: HOSPITAL | Age: 82
End: 2022-03-10
Payer: MEDICARE

## 2022-03-10 NOTE — TELEPHONE ENCOUNTER
Pt needs to be scheduled for an EGD and colonoscopy. Pt is currently taking Xarelto  prescribed by Dr. Brewer at [facility].  Faxed request for holding instructions to [phone].  Phone [phone].      Cardiac clearance request faxed to Dr. Garcia at [facility].  Faxed to [phone] and Phone [phone]    Scheduling pending response.

## 2022-03-11 ENCOUNTER — LAB VISIT (OUTPATIENT)
Dept: LAB | Facility: HOSPITAL | Age: 82
End: 2022-03-11
Attending: INTERNAL MEDICINE
Payer: MEDICARE

## 2022-03-11 DIAGNOSIS — C18.7 MALIGNANT NEOPLASM OF SIGMOID COLON: ICD-10-CM

## 2022-03-11 LAB
ALBUMIN SERPL BCP-MCNC: 3.7 G/DL (ref 3.5–5.2)
ALP SERPL-CCNC: 67 U/L (ref 55–135)
ALT SERPL W/O P-5'-P-CCNC: 27 U/L (ref 10–44)
ANION GAP SERPL CALC-SCNC: 10 MMOL/L (ref 8–16)
AST SERPL-CCNC: 32 U/L (ref 10–40)
BASOPHILS # BLD AUTO: 0.03 K/UL (ref 0–0.2)
BASOPHILS NFR BLD: 0.5 % (ref 0–1.9)
BILIRUB SERPL-MCNC: 1.1 MG/DL (ref 0.1–1)
BUN SERPL-MCNC: 17 MG/DL (ref 8–23)
CALCIUM SERPL-MCNC: 9.2 MG/DL (ref 8.7–10.5)
CEA SERPL-MCNC: 2.1 NG/ML (ref 0–5)
CHLORIDE SERPL-SCNC: 105 MMOL/L (ref 95–110)
CO2 SERPL-SCNC: 30 MMOL/L (ref 23–29)
CREAT SERPL-MCNC: 0.9 MG/DL (ref 0.5–1.4)
DIFFERENTIAL METHOD: ABNORMAL
EOSINOPHIL # BLD AUTO: 0.1 K/UL (ref 0–0.5)
EOSINOPHIL NFR BLD: 1.5 % (ref 0–8)
ERYTHROCYTE [DISTWIDTH] IN BLOOD BY AUTOMATED COUNT: 14.3 % (ref 11.5–14.5)
EST. GFR  (AFRICAN AMERICAN): >60 ML/MIN/1.73 M^2
EST. GFR  (NON AFRICAN AMERICAN): >60 ML/MIN/1.73 M^2
GLUCOSE SERPL-MCNC: 95 MG/DL (ref 70–110)
HCT VFR BLD AUTO: 38.5 % (ref 40–54)
HGB BLD-MCNC: 12.3 G/DL (ref 14–18)
IMM GRANULOCYTES # BLD AUTO: 0.01 K/UL (ref 0–0.04)
IMM GRANULOCYTES NFR BLD AUTO: 0.2 % (ref 0–0.5)
LYMPHOCYTES # BLD AUTO: 1.5 K/UL (ref 1–4.8)
LYMPHOCYTES NFR BLD: 27 % (ref 18–48)
MCH RBC QN AUTO: 31.8 PG (ref 27–31)
MCHC RBC AUTO-ENTMCNC: 31.9 G/DL (ref 32–36)
MCV RBC AUTO: 100 FL (ref 82–98)
MONOCYTES # BLD AUTO: 0.5 K/UL (ref 0.3–1)
MONOCYTES NFR BLD: 8.9 % (ref 4–15)
NEUTROPHILS # BLD AUTO: 3.4 K/UL (ref 1.8–7.7)
NEUTROPHILS NFR BLD: 61.9 % (ref 38–73)
NRBC BLD-RTO: 0 /100 WBC
PLATELET # BLD AUTO: 155 K/UL (ref 150–450)
PMV BLD AUTO: 9.1 FL (ref 9.2–12.9)
POTASSIUM SERPL-SCNC: 4.3 MMOL/L (ref 3.5–5.1)
PROT SERPL-MCNC: 7 G/DL (ref 6–8.4)
RBC # BLD AUTO: 3.87 M/UL (ref 4.6–6.2)
SODIUM SERPL-SCNC: 145 MMOL/L (ref 136–145)
WBC # BLD AUTO: 5.49 K/UL (ref 3.9–12.7)

## 2022-03-11 PROCEDURE — 36415 COLL VENOUS BLD VENIPUNCTURE: CPT | Performed by: INTERNAL MEDICINE

## 2022-03-11 PROCEDURE — 82378 CARCINOEMBRYONIC ANTIGEN: CPT | Performed by: INTERNAL MEDICINE

## 2022-03-11 PROCEDURE — 85025 COMPLETE CBC W/AUTO DIFF WBC: CPT | Performed by: INTERNAL MEDICINE

## 2022-03-11 PROCEDURE — 80053 COMPREHEN METABOLIC PANEL: CPT | Performed by: INTERNAL MEDICINE

## 2022-03-15 ENCOUNTER — OFFICE VISIT (OUTPATIENT)
Dept: HEMATOLOGY/ONCOLOGY | Facility: CLINIC | Age: 82
End: 2022-03-15
Payer: MEDICARE

## 2022-03-15 VITALS
BODY MASS INDEX: 31.78 KG/M2 | WEIGHT: 222 LBS | HEIGHT: 70 IN | TEMPERATURE: 99 F | DIASTOLIC BLOOD PRESSURE: 73 MMHG | OXYGEN SATURATION: 95 % | HEART RATE: 82 BPM | RESPIRATION RATE: 18 BRPM | SYSTOLIC BLOOD PRESSURE: 145 MMHG

## 2022-03-15 DIAGNOSIS — D51.0 PERNICIOUS ANEMIA: ICD-10-CM

## 2022-03-15 DIAGNOSIS — F33.1 MODERATE EPISODE OF RECURRENT MAJOR DEPRESSIVE DISORDER: ICD-10-CM

## 2022-03-15 DIAGNOSIS — C18.7 MALIGNANT NEOPLASM OF SIGMOID COLON: Primary | ICD-10-CM

## 2022-03-15 DIAGNOSIS — R19.7 DIARRHEA, UNSPECIFIED TYPE: ICD-10-CM

## 2022-03-15 PROCEDURE — 3078F DIAST BP <80 MM HG: CPT | Mod: CPTII,S$GLB,, | Performed by: INTERNAL MEDICINE

## 2022-03-15 PROCEDURE — 99214 OFFICE O/P EST MOD 30 MIN: CPT | Mod: S$GLB,,, | Performed by: INTERNAL MEDICINE

## 2022-03-15 PROCEDURE — 3288F PR FALLS RISK ASSESSMENT DOCUMENTED: ICD-10-PCS | Mod: CPTII,S$GLB,, | Performed by: INTERNAL MEDICINE

## 2022-03-15 PROCEDURE — 99999 PR PBB SHADOW E&M-EST. PATIENT-LVL IV: ICD-10-PCS | Mod: PBBFAC,,, | Performed by: INTERNAL MEDICINE

## 2022-03-15 PROCEDURE — 3078F PR MOST RECENT DIASTOLIC BLOOD PRESSURE < 80 MM HG: ICD-10-PCS | Mod: CPTII,S$GLB,, | Performed by: INTERNAL MEDICINE

## 2022-03-15 PROCEDURE — 1101F PT FALLS ASSESS-DOCD LE1/YR: CPT | Mod: CPTII,S$GLB,, | Performed by: INTERNAL MEDICINE

## 2022-03-15 PROCEDURE — 3077F PR MOST RECENT SYSTOLIC BLOOD PRESSURE >= 140 MM HG: ICD-10-PCS | Mod: CPTII,S$GLB,, | Performed by: INTERNAL MEDICINE

## 2022-03-15 PROCEDURE — 3288F FALL RISK ASSESSMENT DOCD: CPT | Mod: CPTII,S$GLB,, | Performed by: INTERNAL MEDICINE

## 2022-03-15 PROCEDURE — 1101F PR PT FALLS ASSESS DOC 0-1 FALLS W/OUT INJ PAST YR: ICD-10-PCS | Mod: CPTII,S$GLB,, | Performed by: INTERNAL MEDICINE

## 2022-03-15 PROCEDURE — 1126F AMNT PAIN NOTED NONE PRSNT: CPT | Mod: CPTII,S$GLB,, | Performed by: INTERNAL MEDICINE

## 2022-03-15 PROCEDURE — 1126F PR PAIN SEVERITY QUANTIFIED, NO PAIN PRESENT: ICD-10-PCS | Mod: CPTII,S$GLB,, | Performed by: INTERNAL MEDICINE

## 2022-03-15 PROCEDURE — 99999 PR PBB SHADOW E&M-EST. PATIENT-LVL IV: CPT | Mod: PBBFAC,,, | Performed by: INTERNAL MEDICINE

## 2022-03-15 PROCEDURE — 1159F MED LIST DOCD IN RCRD: CPT | Mod: CPTII,S$GLB,, | Performed by: INTERNAL MEDICINE

## 2022-03-15 PROCEDURE — 1159F PR MEDICATION LIST DOCUMENTED IN MEDICAL RECORD: ICD-10-PCS | Mod: CPTII,S$GLB,, | Performed by: INTERNAL MEDICINE

## 2022-03-15 PROCEDURE — 99214 PR OFFICE/OUTPT VISIT, EST, LEVL IV, 30-39 MIN: ICD-10-PCS | Mod: S$GLB,,, | Performed by: INTERNAL MEDICINE

## 2022-03-15 PROCEDURE — 3077F SYST BP >= 140 MM HG: CPT | Mod: CPTII,S$GLB,, | Performed by: INTERNAL MEDICINE

## 2022-03-15 NOTE — PROGRESS NOTES
PROGRESS NOTE    Subjective:       Patient ID: Placido Howe is a 81 y.o. male.  MRN: [id number]  : 1940    Chief Complaint: Colon Cancer (Depression,fatigue) and Pain (back)  Sigmoid colon adenocarcinoma, stage III    History of Present Illness:   Placido Howe is a 81 y.o. male who presents with sigmoid colon adenocarcinoma.  He is accompanied by his wife today.       As previously documented, the patient was found to have iron deficiency anemia as well as intermittent diarrhea earlier in . He underwent colonoscopy on 03/10/2021 for further evaluation and a mass was found in distal descending colon.  He also reported a weight loss of about 25 lb over the past 1 year.      He has other medical issues including a history of DVT, PE, status post IVC filter.  He also has a permanent pacemaker, hypertension, hyperlipidemia, diabetes mellitus.     Staging workup was negative.  He saw Dr. Mayo and underwent a colon resection and lymph node evaluation on 2021.  Tumor was 2.2 cm, one of 23 lymph nodes were positive for adenocarcinoma, he was MSI stable.     His postop course was complicated by recurrent colitis.  He was admitted to [facility] from 4/10/21-21 for colitis.  C diff was negative at that time.  He was treated with ciprofloxacin and metronidazole.   He was admitted to the hospital again  from 21-21 when he was found to have C diff colitis.  He was initially treated with metronidazole and vancomycin and discharged home to complete 2.5 weeks of vancomycin.     Interim history:  He presents today to discuss his lab, symptoms and further recommendations.  He has colonoscopy scheduled for 22.   He has completed PT and is now exercising at the gym at this time. His depression is slowly improving, he has not seen onc-psych yet.   Her reports intermittent diarrhea controlled with 2 imodium a day.   He reports poor  appetite, has gained some weight.     Oncology History:  Oncology History   Colon cancer   3/31/2021 Initial Diagnosis    Colon cancer     5/19/2021 Cancer Staged    Staging form: Colon and Rectum, AJCC 8th Edition  - Clinical: Stage IIIB (cT3, cN1, cM0)       3/31/21  Path:  1. SIGMOID COLON, RESECTION:   Adenocarcinoma, moderately differentiated, 2.2cm   Tumor invades subserosal adipose tissue   Margins are all negative   One of twenty three lymph nodes positive for metastatic adenocarcinoma (1/23  Pathologic Staging - p T3 N1a Mx   History:  Past Medical History:   Diagnosis Date    Alopecia     Anemia     Anxiety     Arthritis     Colon cancer     sigmoid adenocarcinoma s/p sigmoid colectomy 3/31/21    Deep vein thrombosis     Depression     Diabetes mellitus     Hx of psychiatric care     Hyperlipidemia     Hypertension     Hypertension 4/3/2014    Impaired glucose tolerance 4/3/2014    Obesity (BMI 30-39.9) 4/3/2014    Pulmonary embolism     S/P parathyroidectomy 8/29/2014    Therapy     Type 2 diabetes mellitus, uncontrolled 4/7/2014    Vitamin B12 deficiency 7/10/2014    Vitamin D deficiency 7/10/2014      Past Surgical History:   Procedure Laterality Date    ABDOMINAL SURGERY      CARDIAC PACEMAKER PLACEMENT      CARDIAC PACEMAKER PLACEMENT      CARDIAC PACEMAKER PLACEMENT      CARPAL TUNNEL RELEASE  2013    right hand    carpel tunnel      right hand    CATARACT EXTRACTION  2001    left and right eyes    CHOLECYSTECTOMY      COLONOSCOPY N/A 3/10/2021    Procedure: COLONOSCOPY;  Surgeon: Iván Colunga MD;  Location: [facility] ([address] [place] [address]);  Service: Endoscopy;  Laterality: N/A;  ok to hold xarelto x3 days per Dr. Brewer, see media 12/23-KPvt  medtronic pacemaker  C-Diff negative - see micro - ERW  COVID + on 1/22/21 , per endo protocol, no further testing needed - ERW    ENDOSCOPIC ULTRASOUND OF UPPER GASTROINTESTINAL TRACT N/A 2/24/2021    Procedure: ULTRASOUND, UPPER GI  TRACT, ENDOSCOPIC;  Surgeon: Alessio Wilde MD;  Location: [facility];  Service: Endoscopy;  Laterality: N/A;  Upper endoscopy the pancreas for evaluation of weight loss diarrhea for a year early satiety anorexia and family history of her first-degree relative with pancreatic cancer his father at age 63    ESOPHAGOGASTRODUODENOSCOPY  02/24/2021    ESOPHAGOGASTRODUODENOSCOPY N/A 2/24/2021    Procedure: EGD (ESOPHAGOGASTRODUODENOSCOPY);  Surgeon: Alessio Wilde MD;  Location: [facility];  Service: Endoscopy;  Laterality: N/A;  Please rule out celiac sprue/NO covid needed MP    ESOPHAGOGASTRODUODENOSCOPY N/A 3/10/2021    Procedure: EGD (ESOPHAGOGASTRODUODENOSCOPY);  Surgeon: Iván Colunga MD;  Location: [facility] (4TH FLR);  Service: Endoscopy;  Laterality: N/A;  Will need to test for gastric pH day of case.  Patient needs to be Off all Proton Pump Inhibitors and H2 blocker medications for 2 (Two) weeks prior to EGD.  Nexium (esomeprazole)  Prilosec (omeprazole)   Protonix (pantoprazole)  Prevacid (lansoprazole    FLEXIBLE SIGMOIDOSCOPY N/A 3/31/2021    Procedure: SIGMOIDOSCOPY, FLEXIBLE;  Surgeon: JOHNNY Mayo MD;  [facility];  Service: Colon and Rectal;  Laterality: N/A;    IVC FILTER RETRIEVAL      JOINT REPLACEMENT      quincy knees    KNEE ARTHROSCOPY W/ MENISCAL REPAIR      right    LAPAROSCOPIC SIGMOIDECTOMY N/A 3/31/2021    Procedure: COLECTOMY, SIGMOID, LAPAROSCOPIC, ERAS high;  Surgeon: JOHNNY Mayo MD;  [facility];  Service: Colon and Rectal;  Laterality: N/A;    MOBILIZATION OF SPLENIC FLEXURE N/A 3/31/2021    Procedure: MOBILIZATION, SPLENIC FLEXURE;  Surgeon: JOHNNY Mayo MD;  [facility];  Service: Colon and Rectal;  Laterality: N/A;    THYROID SURGERY  08/2014    TONSILLECTOMY      when pt at 3 y/o    Upper EUS  02/24/2021     Family History   Problem Relation Age of Onset    Rheum arthritis Mother     Diabetes Mellitus  Mother     Alzheimer's disease Mother     Cancer Father     Liver cancer Father 63        Possibly pancreatic cancer mets to the liver    Pancreatic cancer Father 63    Cancer Brother 8        ALL    Leukemia Brother     Breast cancer Sister     Celiac disease Neg Hx     Cirrhosis Neg Hx     Colon cancer Neg Hx     Colon polyps Neg Hx     Esophageal cancer Neg Hx     Stomach cancer Neg Hx     Ulcerative colitis Neg Hx      Social History     Tobacco Use    Smoking status: Never Smoker    Smokeless tobacco: Never Used    Tobacco comment: 5 cigars a year   Substance and Sexual Activity    Alcohol use: Yes     Comment: SOCIALLY    Drug use: No    Sexual activity: Yes     Partners: Female        ROS:   Review of Systems   Constitutional: Negative for fever, malaise/fatigue and weight loss.   HENT: Negative for congestion, ear pain, nosebleeds and sore throat.    Eyes: Negative for blurred vision, double vision and photophobia.   Respiratory: Negative for cough, hemoptysis, sputum production, shortness of breath, wheezing and stridor.    Cardiovascular: Negative for chest pain, palpitations, orthopnea and leg swelling.   Gastrointestinal: Negative for abdominal pain, blood in stool, constipation, diarrhea, heartburn, melena, nausea and vomiting.   Genitourinary: Negative for dysuria and hematuria.   Musculoskeletal: Positive for back pain. Negative for myalgias and neck pain.   Skin: Negative for itching and rash.   Neurological: Positive for weakness. Negative for dizziness, focal weakness, seizures and headaches.   Endo/Heme/Allergies: Negative for polydipsia. Does not bruise/bleed easily.   Psychiatric/Behavioral: Positive for depression. Negative for memory loss. The patient is nervous/anxious. The patient does not have insomnia.         Objective:     Vitals:    03/15/22 1133   BP: (!) 145/73   Pulse: 82   Resp: 18   Temp: 98.8 °F (37.1 °C)   TempSrc: Oral   SpO2: 95%   Weight: 100.7 kg (222 lb  "0.1 oz)   Height: 5' 10" (1.778 m)   PainSc: 0-No pain     Wt Readings from Last 10 Encounters:   03/15/22 100.7 kg (222 lb 0.1 oz)   12/01/21 97.3 kg (214 lb 6.4 oz)   08/25/21 96.2 kg (212 lb 1.3 oz)   05/19/21 96.8 kg (213 lb 6.5 oz)   05/13/21 96.1 kg (211 lb 13.8 oz)   05/02/21 99.3 kg (219 lb)   04/15/21 96.6 kg (212 lb 13.7 oz)   04/11/21 97.5 kg (215 lb)   04/10/21 96.2 kg (212 lb)   03/31/21 97.1 kg (214 lb)       Physical Examination:   Physical Exam  Vitals and nursing note reviewed.   Constitutional:       General: He is not in acute distress.     Appearance: He is not diaphoretic.   HENT:      Head: Normocephalic.      Mouth/Throat:      Pharynx: No oropharyngeal exudate.   Eyes:      General: No scleral icterus.     Conjunctiva/sclera: Conjunctivae normal.   Neck:      Thyroid: No thyromegaly.   Cardiovascular:      Rate and Rhythm: Normal rate and regular rhythm.      Heart sounds: Normal heart sounds. No murmur heard.  Pulmonary:      Effort: Pulmonary effort is normal. No respiratory distress.      Breath sounds: No stridor. No wheezing or rales.   Chest:      Chest wall: No tenderness.   Abdominal:      General: Bowel sounds are normal. There is no distension.      Palpations: Abdomen is soft. There is no mass.      Tenderness: There is no abdominal tenderness. There is no rebound.   Musculoskeletal:         General: No tenderness or deformity. Normal range of motion.      Cervical back: Neck supple.   Lymphadenopathy:      Cervical: No cervical adenopathy.   Skin:     General: Skin is warm and dry.      Findings: No erythema or rash.   Neurological:      Mental Status: He is alert and oriented to person, place, and time.      Cranial Nerves: No cranial nerve deficit.      Coordination: Coordination normal.      Gait: Gait is intact.   Psychiatric:         Mood and Affect: Affect normal.         Cognition and Memory: Memory normal.         Judgment: Judgment normal.          Diagnostic " Tests:  Significant Imaging: I have reviewed and interpreted all pertinent imaging results/findings.      Laboratory Data:  All pertinent labs have been reviewed.  Labs:   Lab Results   Component Value Date    WBC 5.49 03/11/2022    RBC 3.87 (L) 03/11/2022    HGB 12.3 (L) 03/11/2022    HCT 38.5 (L) 03/11/2022     (H) 03/11/2022     03/11/2022    GLU 95 03/11/2022     03/11/2022    K 4.3 03/11/2022    BUN 17 03/11/2022    CREATININE 0.9 03/11/2022    AST 32 03/11/2022    ALT 27 03/11/2022    BILITOT 1.1 (H) 03/11/2022       Assessment/Plan:   Malignant neoplasm of sigmoid colon  pT3N1a, Stage III   He is status post resection.  Adjuvant chemotherapy for stage III disease was discussed at length with the patient and his family during the initial visit.  See  consult for details.  He declined adjuvant chemotherapy.  Labs and scan were discussed.  There is no evidence for recurrent or metastatic disease.  Labs are stable.  CEA is normal.        We will continue active surveillance.    We will continue to see him every 3-6 months, with CT scans done every 6 months for 3-5  Years.  CTs will be done prior to his next visit.  He will undergo a repeat colonoscopy 1 year post surgery, scheduled for May.   -     CBC Auto Differential; Standing  -     CMP; Future; Expected date: 03/15/2022  -     CEA; Future; Expected date: 03/15/2022  -     CT Chest Abdomen Pelvis With Contrast; Future; Expected date: 03/15/2022    Pernicious anemia  Continue B12 injections with PMD.    Diarrhea, unspecified type  Continue Imodium as needed.  Continue GI follow-up.    Moderate episode of recurrent major depressive disorder  Continue Zoloft.  Generally improving.     ECOG SCORE    1 - Restricted in strenuous activity-ambulatory and able to carry out work of a light nature           Discussion:   Follow up in about 3 months (around 6/15/2022) for MD, Lab Results, Chemo.    Plan was discussed with the patient at length, and he  verbalized understanding. Placido was given an opportunity to ask questions that were answered to his satisfaction, and he was advised to call in the interval if any problems or questions arise.    Electronically signed by Tasha Venegas MD

## 2022-03-15 NOTE — Clinical Note
Cbc, cmp, cea, ct chest abd pelvis with contrast end of May or early June. RTC to see me in 3 months after labs and scans

## 2022-04-07 ENCOUNTER — PATIENT MESSAGE (OUTPATIENT)
Dept: ENDOSCOPY | Facility: HOSPITAL | Age: 82
End: 2022-04-07
Payer: MEDICARE

## 2022-04-07 DIAGNOSIS — Z12.11 SPECIAL SCREENING FOR MALIGNANT NEOPLASMS, COLON: Primary | ICD-10-CM

## 2022-04-07 RX ORDER — SODIUM, POTASSIUM,MAG SULFATES 17.5-3.13G
1 SOLUTION, RECONSTITUTED, ORAL ORAL DAILY
Qty: 1 KIT | Refills: 0 | Status: SHIPPED | OUTPATIENT
Start: 2022-04-07 | End: 2022-12-22

## 2022-05-25 ENCOUNTER — TELEPHONE (OUTPATIENT)
Dept: ENDOSCOPY | Facility: HOSPITAL | Age: 82
End: 2022-05-25
Payer: MEDICARE

## 2022-05-26 ENCOUNTER — ANESTHESIA (OUTPATIENT)
Dept: ENDOSCOPY | Facility: HOSPITAL | Age: 82
End: 2022-05-26
Payer: MEDICARE

## 2022-05-26 ENCOUNTER — HOSPITAL ENCOUNTER (OUTPATIENT)
Facility: HOSPITAL | Age: 82
Discharge: HOME | End: 2022-05-26
Attending: INTERNAL MEDICINE | Admitting: INTERNAL MEDICINE
Payer: MEDICARE

## 2022-05-26 ENCOUNTER — ANESTHESIA EVENT (OUTPATIENT)
Dept: ENDOSCOPY | Facility: HOSPITAL | Age: 82
End: 2022-05-26
Payer: MEDICARE

## 2022-05-26 VITALS
BODY MASS INDEX: 30.06 KG/M2 | RESPIRATION RATE: 16 BRPM | TEMPERATURE: 98 F | WEIGHT: 210 LBS | DIASTOLIC BLOOD PRESSURE: 74 MMHG | SYSTOLIC BLOOD PRESSURE: 144 MMHG | HEART RATE: 60 BPM | OXYGEN SATURATION: 96 % | HEIGHT: 70 IN

## 2022-05-26 DIAGNOSIS — K31.A0 GASTRIC INTESTINAL METAPLASIA: ICD-10-CM

## 2022-05-26 DIAGNOSIS — Z79.01 CURRENT USE OF LONG TERM ANTICOAGULATION: ICD-10-CM

## 2022-05-26 DIAGNOSIS — K29.40 ATROPHIC GASTRITIS WITHOUT HEMORRHAGE: Primary | ICD-10-CM

## 2022-05-26 DIAGNOSIS — Z85.038 HISTORY OF COLON CANCER: ICD-10-CM

## 2022-05-26 LAB — GLUCOSE SERPL-MCNC: 103 MG/DL (ref 70–110)

## 2022-05-26 PROCEDURE — 27201012 HC FORCEPS, HOT/COLD, DISP: Performed by: INTERNAL MEDICINE

## 2022-05-26 PROCEDURE — 25000003 PHARM REV CODE 250: Performed by: NURSE ANESTHETIST, CERTIFIED REGISTERED

## 2022-05-26 PROCEDURE — 63600175 PHARM REV CODE 636 W HCPCS: Performed by: NURSE ANESTHETIST, CERTIFIED REGISTERED

## 2022-05-26 PROCEDURE — 88342 IMHCHEM/IMCYTCHM 1ST ANTB: CPT | Performed by: PATHOLOGY

## 2022-05-26 PROCEDURE — 88305 TISSUE EXAM BY PATHOLOGIST: CPT | Performed by: PATHOLOGY

## 2022-05-26 PROCEDURE — 82962 GLUCOSE BLOOD TEST: CPT | Performed by: INTERNAL MEDICINE

## 2022-05-26 PROCEDURE — G0105 COLORECTAL SCRN; HI RISK IND: ICD-10-PCS | Mod: ,,, | Performed by: INTERNAL MEDICINE

## 2022-05-26 PROCEDURE — 43239 PR EGD, FLEX, W/BIOPSY, SGL/MULTI: ICD-10-PCS | Mod: 51,,, | Performed by: INTERNAL MEDICINE

## 2022-05-26 PROCEDURE — 88342 CHG IMMUNOCYTOCHEMISTRY: ICD-10-PCS | Mod: 26,,, | Performed by: PATHOLOGY

## 2022-05-26 PROCEDURE — 43239 EGD BIOPSY SINGLE/MULTIPLE: CPT | Mod: 51,,, | Performed by: INTERNAL MEDICINE

## 2022-05-26 PROCEDURE — 88342 IMHCHEM/IMCYTCHM 1ST ANTB: CPT | Mod: 26,,, | Performed by: PATHOLOGY

## 2022-05-26 PROCEDURE — 37000009 HC ANESTHESIA EA ADD 15 MINS: Performed by: INTERNAL MEDICINE

## 2022-05-26 PROCEDURE — 88305 TISSUE EXAM BY PATHOLOGIST: ICD-10-PCS | Mod: 26,,, | Performed by: PATHOLOGY

## 2022-05-26 PROCEDURE — G0105 COLORECTAL SCRN; HI RISK IND: HCPCS | Performed by: INTERNAL MEDICINE

## 2022-05-26 PROCEDURE — G0105 COLORECTAL SCRN; HI RISK IND: HCPCS | Mod: ,,, | Performed by: INTERNAL MEDICINE

## 2022-05-26 PROCEDURE — 37000008 HC ANESTHESIA 1ST 15 MINUTES: Performed by: INTERNAL MEDICINE

## 2022-05-26 PROCEDURE — E9220 PRA ENDO ANESTHESIA: ICD-10-PCS | Mod: ,,, | Performed by: NURSE ANESTHETIST, CERTIFIED REGISTERED

## 2022-05-26 PROCEDURE — 25000003 PHARM REV CODE 250: Performed by: INTERNAL MEDICINE

## 2022-05-26 PROCEDURE — 88305 TISSUE EXAM BY PATHOLOGIST: CPT | Mod: 26,,, | Performed by: PATHOLOGY

## 2022-05-26 PROCEDURE — 43239 EGD BIOPSY SINGLE/MULTIPLE: CPT | Performed by: INTERNAL MEDICINE

## 2022-05-26 PROCEDURE — E9220 PRA ENDO ANESTHESIA: HCPCS | Mod: ,,, | Performed by: NURSE ANESTHETIST, CERTIFIED REGISTERED

## 2022-05-26 RX ORDER — SODIUM CHLORIDE 9 MG/ML
INJECTION, SOLUTION INTRAVENOUS CONTINUOUS
Status: DISCONTINUED | OUTPATIENT
Start: 2022-05-26 | End: 2022-05-26 | Stop reason: HOSPADM

## 2022-05-26 RX ORDER — PROPOFOL 10 MG/ML
VIAL (ML) INTRAVENOUS CONTINUOUS PRN
Status: DISCONTINUED | OUTPATIENT
Start: 2022-05-26 | End: 2022-05-26

## 2022-05-26 RX ORDER — PROPOFOL 10 MG/ML
VIAL (ML) INTRAVENOUS
Status: DISCONTINUED | OUTPATIENT
Start: 2022-05-26 | End: 2022-05-26

## 2022-05-26 RX ORDER — DEXMEDETOMIDINE HYDROCHLORIDE 100 UG/ML
INJECTION, SOLUTION INTRAVENOUS
Status: DISCONTINUED | OUTPATIENT
Start: 2022-05-26 | End: 2022-05-26

## 2022-05-26 RX ORDER — PANTOPRAZOLE SODIUM 40 MG/1
40 TABLET, DELAYED RELEASE ORAL
Qty: 90 TABLET | Refills: 0 | Status: SHIPPED | OUTPATIENT
Start: 2022-05-26 | End: 2022-09-02 | Stop reason: SDUPTHER

## 2022-05-26 RX ORDER — EPHEDRINE SULFATE 50 MG/ML
INJECTION, SOLUTION INTRAVENOUS
Status: DISCONTINUED | OUTPATIENT
Start: 2022-05-26 | End: 2022-05-26

## 2022-05-26 RX ORDER — LIDOCAINE HCL/PF 100 MG/5ML
SYRINGE (ML) INTRAVENOUS
Status: DISCONTINUED | OUTPATIENT
Start: 2022-05-26 | End: 2022-05-26

## 2022-05-26 RX ORDER — PHENYLEPHRINE HYDROCHLORIDE 10 MG/ML
INJECTION INTRAVENOUS
Status: DISCONTINUED | OUTPATIENT
Start: 2022-05-26 | End: 2022-05-26

## 2022-05-26 RX ADMIN — PHENYLEPHRINE HYDROCHLORIDE 100 MCG: 10 INJECTION INTRAVENOUS at 02:05

## 2022-05-26 RX ADMIN — DEXMEDETOMIDINE HYDROCHLORIDE 8 MCG: 100 INJECTION, SOLUTION, CONCENTRATE INTRAVENOUS at 02:05

## 2022-05-26 RX ADMIN — PROPOFOL 30 MG: 10 INJECTION, EMULSION INTRAVENOUS at 02:05

## 2022-05-26 RX ADMIN — EPHEDRINE SULFATE 5 MG: 50 INJECTION INTRAVENOUS at 02:05

## 2022-05-26 RX ADMIN — PROPOFOL 150 MCG/KG/MIN: 10 INJECTION, EMULSION INTRAVENOUS at 02:05

## 2022-05-26 RX ADMIN — EPHEDRINE SULFATE 10 MG: 50 INJECTION INTRAVENOUS at 02:05

## 2022-05-26 RX ADMIN — SODIUM CHLORIDE: 0.9 INJECTION, SOLUTION INTRAVENOUS at 01:05

## 2022-05-26 RX ADMIN — Medication 40 MG: at 02:05

## 2022-05-26 RX ADMIN — DEXMEDETOMIDINE HYDROCHLORIDE 4 MCG: 100 INJECTION, SOLUTION, CONCENTRATE INTRAVENOUS at 02:05

## 2022-05-26 RX ADMIN — PHENYLEPHRINE HYDROCHLORIDE 50 MCG: 10 INJECTION INTRAVENOUS at 02:05

## 2022-05-26 NOTE — PROVATION PATIENT INSTRUCTIONS
Discharge Summary/Instructions after an Endoscopic Procedure  Patient Name: Placido Howe  Patient MRN: [id number]  Patient YOB: 1940  Thursday, May 26, 2022  Iván Colunga MD  Dear patient,  As a result of recent federal legislation (The Federal Cures Act), you may   receive lab or pathology results from your procedure in your [facility]   account before your physician is able to contact you. Your physician or   their representative will relay the results to you with their   recommendations at their soonest availability.  Thank you,  RESTRICTIONS:  During your procedure today, you received medications for sedation.  These   medications may affect your judgment, balance and coordination.  Therefore,   for 24 hours, you have the following restrictions:   - DO NOT drive a car, operate machinery, make legal/financial decisions,   sign important papers or drink alcohol.    ACTIVITY:  Today: no heavy lifting, straining or running due to procedural   sedation/anesthesia.  The following day: return to full activity including work.  DIET:  Eat and drink normally unless instructed otherwise.     TREATMENT FOR COMMON SIDE EFFECTS:  - Mild abdominal pain, nausea, belching, bloating or excessive gas:  rest,   eat lightly and use a heating pad.  - Sore Throat: treat with throat lozenges and/or gargle with warm salt   water.  - Because air was used during the procedure, expelling large amounts of air   from your rectum or belching is normal.  - If a bowel prep was taken, you may not have a bowel movement for 1-3 days.    This is normal.  SYMPTOMS TO WATCH FOR AND REPORT TO YOUR PHYSICIAN:  1. Abdominal pain or bloating, other than gas cramps.  2. Chest pain.  3. Back pain.  4. Signs of infection such as: chills or fever occurring within 24 hours   after the procedure.  5. Rectal bleeding, which would show as bright red, maroon, or black stools.   (A tablespoon of blood from the rectum is not serious, especially  if   hemorrhoids are present.)  6. Vomiting.  7. Weakness or dizziness.  GO DIRECTLY TO THE NEAREST EMERGENCY ROOM IF YOU HAVE ANY OF THE FOLLOWING:      Difficulty breathing              Chills and/or fever over 101 F   Persistent vomiting and/or vomiting blood   Severe abdominal pain   Severe chest pain   Black, tarry stools   Bleeding- more than one tablespoon   Any other symptom or condition that you feel may need urgent attention  Your doctor recommends these additional instructions:  If any biopsies were taken, your doctors clinic will contact you in 1 to 2   weeks with any results.  - Discharge patient to home.   - Use Protonix 40 mg once daily for 8 weeks (or any other full strength   proton pump inhibitor) - best taken 45 minutes before your first protein   containing meal.   - Await pathology results.   - Telephone endoscopist for pathology results in 3 weeks.   - Resume Xarelto (rivaroxaban) at prior dose tomorrow.   - The findings and recommendations were discussed with the patient.  For questions, problems or results please call your physician - Iván Colunga MD at Work:  [phone].  [facility], EMERGENCY ROOM PHONE NUMBER: [phone]  IF A COMPLICATION OR EMERGENCY SITUATION ARISES AND YOU ARE UNABLE TO REACH   YOUR PHYSICIAN - GO DIRECTLY TO THE EMERGENCY ROOM.  Iván Colunga MD  5/26/2022 2:57:39 PM  This report has been verified and signed electronically.  Dear patient,  As a result of recent federal legislation (The Federal Cures Act), you may   receive lab or pathology results from your procedure in your [facility]   account before your physician is able to contact you. Your physician or   their representative will relay the results to you with their   recommendations at their soonest availability.  Thank you,  PROVATION

## 2022-05-26 NOTE — H&P
[facility]  History & Physical    Subjective:      Chief Complaint/Reason for Admission:     EGD and colonoscopy    Placido Howe is a 81 y.o. male.    Past Medical History:   Diagnosis Date    Alopecia     Anemia     Anxiety     Arthritis     Colon cancer     sigmoid adenocarcinoma s/p sigmoid colectomy 3/31/21    Deep vein thrombosis     Depression     Diabetes mellitus     Hx of psychiatric care     Hyperlipidemia     Hypertension     Hypertension 4/3/2014    Impaired glucose tolerance 4/3/2014    Obesity (BMI 30-39.9) 4/3/2014    Pulmonary embolism     S/P parathyroidectomy 8/29/2014    Therapy     Type 2 diabetes mellitus, uncontrolled 4/7/2014    Vitamin B12 deficiency 7/10/2014    Vitamin D deficiency 7/10/2014     Past Surgical History:   Procedure Laterality Date    ABDOMINAL SURGERY      CARDIAC PACEMAKER PLACEMENT      CARDIAC PACEMAKER PLACEMENT      CARDIAC PACEMAKER PLACEMENT      CARPAL TUNNEL RELEASE  2013    right hand    carpel tunnel      right hand    CATARACT EXTRACTION  2001    left and right eyes    CHOLECYSTECTOMY      COLONOSCOPY N/A 3/10/2021    Procedure: COLONOSCOPY;  Surgeon: Iván Colunga MD;  Location: [facility] ([address] [place] [address]);  Service: Endoscopy;  Laterality: N/A;  ok to hold xarelto x3 days per Dr. Brewer, see media 12/23-KPvt  medtronic pacemaker  C-Diff negative - see micro - ERW  COVID + on 1/22/21 , per endo protocol, no further testing needed - ERW    ENDOSCOPIC ULTRASOUND OF UPPER GASTROINTESTINAL TRACT N/A 2/24/2021    Procedure: ULTRASOUND, UPPER GI TRACT, ENDOSCOPIC;  Surgeon: Alessio Wilde MD;  Location: [facility];  Service: Endoscopy;  Laterality: N/A;  Upper endoscopy the pancreas for evaluation of weight loss diarrhea for a year early satiety anorexia and family history of her first-degree relative with pancreatic cancer his father at age 63    ESOPHAGOGASTRODUODENOSCOPY  02/24/2021     ESOPHAGOGASTRODUODENOSCOPY N/A 2/24/2021    Procedure: EGD (ESOPHAGOGASTRODUODENOSCOPY);  Surgeon: Alessio Wilde MD;  Location: [facility];  Service: Endoscopy;  Laterality: N/A;  Please rule out celiac sprue/NO covid needed MP    ESOPHAGOGASTRODUODENOSCOPY N/A 3/10/2021    Procedure: EGD (ESOPHAGOGASTRODUODENOSCOPY);  Surgeon: Iván Colunga MD;  Location: [facility] (4TH FLR);  Service: Endoscopy;  Laterality: N/A;  Will need to test for gastric pH day of case.  Patient needs to be Off all Proton Pump Inhibitors and H2 blocker medications for 2 (Two) weeks prior to EGD.  Nexium (esomeprazole)  Prilosec (omeprazole)   Protonix (pantoprazole)  Prevacid (lansoprazole    FLEXIBLE SIGMOIDOSCOPY N/A 3/31/2021    Procedure: SIGMOIDOSCOPY, FLEXIBLE;  Surgeon: JOHNNY Mayo MD;  [facility];  Service: Colon and Rectal;  Laterality: N/A;    IVC FILTER RETRIEVAL      JOINT REPLACEMENT      quincy knees    KNEE ARTHROSCOPY W/ MENISCAL REPAIR      right    LAPAROSCOPIC SIGMOIDECTOMY N/A 3/31/2021    Procedure: COLECTOMY, SIGMOID, LAPAROSCOPIC, ERAS high;  Surgeon: JOHNNY Mayo MD;  Location: [facility] OR 2ND FLR;  Service: Colon and Rectal;  Laterality: N/A;    MOBILIZATION OF SPLENIC FLEXURE N/A 3/31/2021    Procedure: MOBILIZATION, SPLENIC FLEXURE;  Surgeon: JOHNNY Mayo MD;  [facility];  Service: Colon and Rectal;  Laterality: N/A;    THYROID SURGERY  08/2014    TONSILLECTOMY      when pt at 5 y/o    Upper EUS  02/24/2021     Family History   Problem Relation Age of Onset    Rheum arthritis Mother     Diabetes Mellitus Mother     Alzheimer's disease Mother     Cancer Father     Liver cancer Father 63        Possibly pancreatic cancer mets to the liver    Pancreatic cancer Father 63    Cancer Brother 8        ALL    Leukemia Brother     Breast cancer Sister     Celiac disease Neg Hx     Cirrhosis Neg Hx     Colon cancer Neg Hx     Colon polyps Neg Hx      Esophageal cancer Neg Hx     Stomach cancer Neg Hx     Ulcerative colitis Neg Hx      Social History     Tobacco Use    Smoking status: Never Smoker    Smokeless tobacco: Never Used    Tobacco comment: 5 cigars a year   Substance Use Topics    Alcohol use: Yes     Comment: SOCIALLY    Drug use: No       PTA Medications   Medication Sig    acetaminophen (TYLENOL) 325 MG tablet Take 2 tablets (650 mg total) by mouth every 4 (four) hours as needed for Temperature greater than (temp > 100).    alendronate (FOSAMAX) 70 MG tablet TAKE ONE TABLET BY MOUTH EVERY 7 DAYS    allopurinol (ZYLOPRIM) 300 MG tablet Take 300 mg by mouth every morning.     alprazolam (XANAX) 0.5 MG tablet Take 0.5 mg by mouth 3 (three) times daily as needed.    aspirin (ECOTRIN) 81 MG EC tablet Take 81 mg by mouth once daily. PATIENT TAKES 3 X WEEKLY (M-W-F)    atorvastatin (LIPITOR) 40 MG tablet Take 40 mg by mouth every evening.     BENADRYL 25 mg capsule Take 25 mg by mouth nightly as needed.    FEROSUL 325 mg (65 mg iron) Tab tablet Take 1 tablet (325 mg total) by mouth every 12 (twelve) hours.    IMODIUM A-D 2 mg Tab Take 2 mg by mouth 2 (two) times daily.    NITROSTAT 0.4 mg SL tablet     pioglitazone (ACTOS) 30 MG tablet Take 30 mg by mouth every morning.     sertraline (ZOLOFT) 100 MG tablet Take 100 mg by mouth nightly.    sildenafiL (VIAGRA) 100 MG tablet Take 100 mg by mouth as needed. as directed.    sodium,potassium,mag sulfates (SUPREP BOWEL PREP KIT) 17.5-3.13-1.6 gram SolR Take 177 mLs by mouth once daily.    sotalol (BETAPACE) 120 MG Tab Take 80 mg by mouth every 12 (twelve) hours.    spironolactone (ALDACTONE) 25 MG tablet Take 1 tablet (25 mg total) by mouth every evening.    VITAMIN C 250 MG tablet Take 250 mg by mouth 2 (two) times daily.    XARELTO 10 mg Tab TAKE ONE TABLET BY MOUTH EVERY EVENING WITH FOOD     Review of patient's allergies indicates:  No Known Allergies     Review of Systems    Constitutional: Negative for chills and fever.   Respiratory: Negative for shortness of breath.    Cardiovascular: Negative for chest pain.   Gastrointestinal: Negative for abdominal pain.       Objective:      Vital Signs (Most Recent)       Vital Signs Range (Last 24H):       Physical Exam  Constitutional:       Appearance: Normal appearance.   Pulmonary:      Effort: Pulmonary effort is normal.   Neurological:      Mental Status: He is alert and oriented to person, place, and time.           Assessment:      There are no hospital problems to display for this patient.      Plan:    EGD and colonoscopy for gastric intestinal metaplasia and surveillance colon cancer surveillance.

## 2022-05-26 NOTE — TRANSFER OF CARE
"Anesthesia Transfer of Care Note    Patient: Placido Howe    Procedure(s) Performed: Procedure(s) (LRB):  EGD (ESOPHAGOGASTRODUODENOSCOPY) (N/A)  COLONOSCOPY (N/A)    Patient location: PACU    Anesthesia Type: general    Transport from OR: Transported from OR on room air with adequate spontaneous ventilation    Post pain: adequate analgesia    Post assessment: no apparent anesthetic complications and tolerated procedure well    Post vital signs: stable    Level of consciousness: awake    Nausea/Vomiting: no nausea/vomiting    Complications: none    Transfer of care protocol was followed      Last vitals:   Visit Vitals  /60 (BP Location: Left arm, Patient Position: Lying)   Pulse 60   Temp 36.5 °C (97.7 °F) (Temporal)   Resp 16   Ht 5' 10" (1.778 m)   Wt 95.3 kg (210 lb)   SpO2 96%   BMI 30.13 kg/m²     "

## 2022-05-26 NOTE — PLAN OF CARE
Procedure completed. Pt discharged with wife. In no acute distress. Discharge instructions given. Verbalizes understanding of post procedure restrictions and instructions.

## 2022-05-26 NOTE — ANESTHESIA PREPROCEDURE EVALUATION
[facility]  Anesthesia Pre-Operative Evaluation         Patient Name: Placido Howe  YOB: 1940  MRN: [id number]    SUBJECTIVE:     05/26/2022    Procedure(s) (LRB):  EGD (ESOPHAGOGASTRODUODENOSCOPY) (N/A)  COLONOSCOPY (N/A)    Placido Howe is a 81 y.o. male here for above procedure    Drips:    sodium chloride 0.9% 10 mL/hr at 05/26/22 1339       Patient Active Problem List   Diagnosis    Obesity (BMI 30-39.9)    Hypertension    Generalized osteoarthritis    Nontraumatic retroperitoneal hematoma    Hemorrhage while on warfarin therapy    Type 2 diabetes mellitus without complication, without long-term current use of insulin    Chondrocalcinosis    Osteopenia of multiple sites    Chronic deep vein thrombosis (DVT) of lower extremity    Abnormality of secretion of gastrin    Pernicious anemia    Elevated gastrin level    Weight loss, unintentional    Diarrhea    Iron deficiency anemia    Colon cancer    Colitis, acute    Clostridium difficile infection    HLD (hyperlipidemia)    Urinary retention    Debility    Moderate episode of recurrent major depressive disorder       Review of patient's allergies indicates:  No Known Allergies    No current facility-administered medications on file prior to encounter.     Current Outpatient Medications on File Prior to Encounter   Medication Sig Dispense Refill    sotalol (BETAPACE) 120 MG Tab Take 80 mg by mouth every 12 (twelve) hours.      spironolactone (ALDACTONE) 25 MG tablet Take 1 tablet (25 mg total) by mouth every evening. 30 tablet 11    acetaminophen (TYLENOL) 325 MG tablet Take 2 tablets (650 mg total) by mouth every 4 (four) hours as needed for Temperature greater than (temp > 100).  0    alendronate (FOSAMAX) 70 MG tablet TAKE ONE TABLET BY MOUTH EVERY 7 DAYS 4 tablet 1    allopurinol (ZYLOPRIM) 300 MG  tablet Take 300 mg by mouth every morning.       alprazolam (XANAX) 0.5 MG tablet Take 0.5 mg by mouth 3 (three) times daily as needed.  1    aspirin (ECOTRIN) 81 MG EC tablet Take 81 mg by mouth once daily. PATIENT TAKES 3 X WEEKLY (M-W-F)      atorvastatin (LIPITOR) 40 MG tablet Take 40 mg by mouth every evening.       BENADRYL 25 mg capsule Take 25 mg by mouth nightly as needed.      FEROSUL 325 mg (65 mg iron) Tab tablet Take 1 tablet (325 mg total) by mouth every 12 (twelve) hours. 60 tablet 4    IMODIUM A-D 2 mg Tab Take 2 mg by mouth 2 (two) times daily.      NITROSTAT 0.4 mg SL tablet   2    pioglitazone (ACTOS) 30 MG tablet Take 30 mg by mouth every morning.       sertraline (ZOLOFT) 100 MG tablet Take 100 mg by mouth nightly.      sildenafiL (VIAGRA) 100 MG tablet Take 100 mg by mouth as needed. as directed.      VITAMIN C 250 MG tablet Take 250 mg by mouth 2 (two) times daily.      XARELTO 10 mg Tab TAKE ONE TABLET BY MOUTH EVERY EVENING WITH FOOD  2       Past Surgical History:   Procedure Laterality Date    ABDOMINAL SURGERY      CARDIAC PACEMAKER PLACEMENT      CARDIAC PACEMAKER PLACEMENT      CARDIAC PACEMAKER PLACEMENT      CARPAL TUNNEL RELEASE  2013    right hand    carpel tunnel      right hand    CATARACT EXTRACTION  2001    left and right eyes    CHOLECYSTECTOMY      COLONOSCOPY N/A 3/10/2021    Procedure: COLONOSCOPY;  Surgeon: Iván Colunga MD;  Location: [facility] ([address] [place] [address]);  Service: Endoscopy;  Laterality: N/A;  ok to hold xarelto x3 days per Dr. Brewer, see media 12/23-KPvt  medtronic pacemaker  C-Diff negative - see micro - ERW  COVID + on 1/22/21 , per endo protocol, no further testing needed - ERW    ENDOSCOPIC ULTRASOUND OF UPPER GASTROINTESTINAL TRACT N/A 2/24/2021    Procedure: ULTRASOUND, UPPER GI TRACT, ENDOSCOPIC;  Surgeon: Alessio Wilde MD;  Location: [facility] ENDO;  Service: Endoscopy;  Laterality: N/A;  Upper endoscopy the pancreas for evaluation of weight  loss diarrhea for a year early satiety anorexia and family history of her first-degree relative with pancreatic cancer his father at age 63    ESOPHAGOGASTRODUODENOSCOPY  02/24/2021    ESOPHAGOGASTRODUODENOSCOPY N/A 2/24/2021    Procedure: EGD (ESOPHAGOGASTRODUODENOSCOPY);  Surgeon: Alessio Wilde MD;  Location: [facility];  Service: Endoscopy;  Laterality: N/A;  Please rule out celiac sprue/NO covid needed MP    ESOPHAGOGASTRODUODENOSCOPY N/A 3/10/2021    Procedure: EGD (ESOPHAGOGASTRODUODENOSCOPY);  Surgeon: Iván Colunga MD;  Location: [facility] (4TH FLR);  Service: Endoscopy;  Laterality: N/A;  Will need to test for gastric pH day of case.  Patient needs to be Off all Proton Pump Inhibitors and H2 blocker medications for 2 (Two) weeks prior to EGD.  Nexium (esomeprazole)  Prilosec (omeprazole)   Protonix (pantoprazole)  Prevacid (lansoprazole    FLEXIBLE SIGMOIDOSCOPY N/A 3/31/2021    Procedure: SIGMOIDOSCOPY, FLEXIBLE;  Surgeon: JOHNNY Mayo MD;  [facility];  Service: Colon and Rectal;  Laterality: N/A;    IVC FILTER RETRIEVAL      JOINT REPLACEMENT      quincy knees    KNEE ARTHROSCOPY W/ MENISCAL REPAIR      right    LAPAROSCOPIC SIGMOIDECTOMY N/A 3/31/2021    Procedure: COLECTOMY, SIGMOID, LAPAROSCOPIC, ERAS high;  Surgeon: JOHNNY Mayo MD;  Location: [facility] OR 2ND FLR;  Service: Colon and Rectal;  Laterality: N/A;    MOBILIZATION OF SPLENIC FLEXURE N/A 3/31/2021    Procedure: MOBILIZATION, SPLENIC FLEXURE;  Surgeon: JOHNNY Mayo MD;  [facility];  Service: Colon and Rectal;  Laterality: N/A;    THYROID SURGERY  08/2014    TONSILLECTOMY      when pt at 5 y/o    Upper EUS  02/24/2021       Social History     Socioeconomic History    Marital status:      Spouse name: Ceci    Number of children: 3   Tobacco Use    Smoking status: Never Smoker    Smokeless tobacco: Never Used    Tobacco comment: 5 cigars a year   Substance and Sexual  "Activity    Alcohol use: Yes     Comment: SOCIALLY    Drug use: No    Sexual activity: Yes     Partners: Female   Social History Narrative    Retired  and oil-field salesman    2 daughters (Yovana-[place], Angela-[place]), 1 son ([place])     58 years         OBJECTIVE:     Vital Signs Range (Last 24H):       Significant Labs:  Lab Results   Component Value Date    WBC 5.49 03/11/2022    HGB 12.3 (L) 03/11/2022    HCT 38.5 (L) 03/11/2022     03/11/2022    CHOL 147 07/10/2014    TRIG 116 07/10/2014    HDL 43 07/10/2014    ALT 27 03/11/2022    AST 32 03/11/2022     03/11/2022    K 4.3 03/11/2022     03/11/2022    CREATININE 0.9 03/11/2022    BUN 17 03/11/2022    CO2 30 (H) 03/11/2022    TSH 3.018 12/03/2020    INR 1.2 05/01/2021    HGBA1C 5.8 (H) 04/01/2021       Diagnostic Studies:    EKG:   Results for orders placed or performed during the hospital encounter of 05/01/21   EKG 12-lead    Collection Time: 05/01/21  2:33 PM    Narrative    Test Reason :     Vent. Rate : 066 BPM     Atrial Rate : 066 BPM     P-R Int : 216 ms          QRS Dur : 122 ms      QT Int : 428 ms       P-R-T Axes : 018 -37 030 degrees     QTc Int : 448 ms    Sinus rhythm with 1st degree A-V block with atrial-paced complexes  Left axis deviation  Nonspecific intraventricular conduction delay  Nonspecific ST and T wave abnormality  Abnormal ECG  When compared with ECG of 10-APR-2021 20:45,  Sinus rhythm Now present    Confirmed by Scot Dorman MD ([id number]) on 5/2/2021 11:16:49 AM    Referred By: [email or web address]   SELF           Confirmed By:Scot Dorman MD       2D ECHO:  TTE:  · 5/2/21: "The left ventricle is normal in size with normal systolic function.  · The estimated ejection fraction is 55-60%.  · Grade II left ventricular diastolic dysfunction.  · The sinuses of Valsalva is mildly dilated.  · Normal right ventricular size with normal right ventricular systolic function.  · The estimated PA systolic pressure " "is 40 mmHg.  · Normal central venous pressure (3 mmHg).  · Mild left atrial enlargement."     Pre-op Assessment    I have reviewed the Patient Summary Reports.     I have reviewed the Nursing Notes. I have reviewed the NPO Status.   I have reviewed the Medications.     Review of Systems  Anesthesia Hx:  No problems with previous Anesthesia  History of prior surgery of interest to airway management or planning:  Denies Personal Hx of Anesthesia complications.   Social:  Smoker    Hematology/Oncology:        Current/Recent Cancer. Other (see Oncology comments) surgery Oncology Comments: [place]     Cardiovascular:   Exercise tolerance: good Hypertension Denies MI.   Prior DVTs and PE on Xarelto     Pacemaker     Seen cardiology 4/6/22: Low risk for yogesh-op cardiac events and hold xarelto for 48-hours prior to procedure    Pulmonary:  Pulmonary Normal  Denies COPD.  Denies Asthma.  Denies Sleep Apnea.    Hepatic/GI:   Denies GERD.    Musculoskeletal:  Spine Disorders: lumbar    Neurological:  Neurology Normal Denies TIA.  Denies CVA. Denies Seizures.    Endocrine:   Diabetes    Psych:   depression          Physical Exam  General: Well nourished, Cooperative, Alert and Oriented    Airway:  Mallampati: / I  Mouth Opening: Normal  TM Distance: Normal  Tongue: Normal  Neck ROM: Normal ROM    Dental:  Denies loose teeth      Anesthesia Plan  Type of Anesthesia, risks & benefits discussed:    Anesthesia Type: Gen Natural Airway  Intra-op Monitoring Plan: Standard ASA Monitors  Post Op Pain Control Plan: multimodal analgesia  Induction:  IV  Informed Consent: Informed consent signed with the Patient and all parties understand the risks and agree with anesthesia plan.  All questions answered.   ASA Score: 3  Day of Surgery Review of History & Physical: H&P Update referred to the surgeon/provider.  Anesthesia Plan Notes: Finished bowel prep at 9 a.m.    Took 3 medicines, unknown which ones (part of med pack).  Thought to " include sotalol.     Ready For Surgery From Anesthesia Perspective.     .

## 2022-05-26 NOTE — PROVATION PATIENT INSTRUCTIONS
Discharge Summary/Instructions after an Endoscopic Procedure  Patient Name: Placido Howe  Patient MRN: [id number]  Patient YOB: 1940  Thursday, May 26, 2022  Iván Colunga MD  Dear patient,  As a result of recent federal legislation (The Federal Cures Act), you may   receive lab or pathology results from your procedure in your [facility]   account before your physician is able to contact you. Your physician or   their representative will relay the results to you with their   recommendations at their soonest availability.  Thank you,  RESTRICTIONS:  During your procedure today, you received medications for sedation.  These   medications may affect your judgment, balance and coordination.  Therefore,   for 24 hours, you have the following restrictions:   - DO NOT drive a car, operate machinery, make legal/financial decisions,   sign important papers or drink alcohol.    ACTIVITY:  Today: no heavy lifting, straining or running due to procedural   sedation/anesthesia.  The following day: return to full activity including work.  DIET:  Eat and drink normally unless instructed otherwise.     TREATMENT FOR COMMON SIDE EFFECTS:  - Mild abdominal pain, nausea, belching, bloating or excessive gas:  rest,   eat lightly and use a heating pad.  - Sore Throat: treat with throat lozenges and/or gargle with warm salt   water.  - Because air was used during the procedure, expelling large amounts of air   from your rectum or belching is normal.  - If a bowel prep was taken, you may not have a bowel movement for 1-3 days.    This is normal.  SYMPTOMS TO WATCH FOR AND REPORT TO YOUR PHYSICIAN:  1. Abdominal pain or bloating, other than gas cramps.  2. Chest pain.  3. Back pain.  4. Signs of infection such as: chills or fever occurring within 24 hours   after the procedure.  5. Rectal bleeding, which would show as bright red, maroon, or black stools.   (A tablespoon of blood from the rectum is not serious, especially  if   hemorrhoids are present.)  6. Vomiting.  7. Weakness or dizziness.  GO DIRECTLY TO THE NEAREST EMERGENCY ROOM IF YOU HAVE ANY OF THE FOLLOWING:      Difficulty breathing              Chills and/or fever over 101 F   Persistent vomiting and/or vomiting blood   Severe abdominal pain   Severe chest pain   Black, tarry stools   Bleeding- more than one tablespoon   Any other symptom or condition that you feel may need urgent attention  Your doctor recommends these additional instructions:  If any biopsies were taken, your doctors clinic will contact you in 1 to 2   weeks with any results.  - Discharge patient to home.   - Repeat colonoscopy in 3 years for surveillance.   - Return to primary care physician.   - Resume Xarelto (rivaroxaban) at prior dose tomorrow (due to EGD bxs).   - The findings and recommendations were discussed with the patient.  For questions, problems or results please call your physician - Iván Colunga MD at Work:  [phone].  [facility], EMERGENCY ROOM PHONE NUMBER: [phone]  IF A COMPLICATION OR EMERGENCY SITUATION ARISES AND YOU ARE UNABLE TO REACH   YOUR PHYSICIAN - GO DIRECTLY TO THE EMERGENCY ROOM.  Iván Colunga MD  5/26/2022 2:57:09 PM  This report has been verified and signed electronically.  Dear patient,  As a result of recent federal legislation (The Federal Cures Act), you may   receive lab or pathology results from your procedure in your [facility]   account before your physician is able to contact you. Your physician or   their representative will relay the results to you with their   recommendations at their soonest availability.  Thank you,  PROVATION

## 2022-05-26 NOTE — ANESTHESIA POSTPROCEDURE EVALUATION
Anesthesia Post Evaluation    Patient: Placido Howe    Procedure(s) Performed: Procedure(s) (LRB):  EGD (ESOPHAGOGASTRODUODENOSCOPY) (N/A)  COLONOSCOPY (N/A)    Final Anesthesia Type: general      Patient location during evaluation: PACU  Patient participation: Yes- Able to Participate  Level of consciousness: awake  Post-procedure vital signs: reviewed and stable  Pain management: adequate  Airway patency: patent    PONV status at discharge: No PONV  Anesthetic complications: no      Cardiovascular status: blood pressure returned to baseline  Respiratory status: unassisted  Hydration status: euvolemic  Follow-up not needed.  Comments: BP recovered after emergence and end of propofol infusion.  No issues in recovery and no issues with anesthesia.           Vitals Value Taken Time   /74 05/26/22 1531   Temp 36.5 °C (97.7 °F) 05/26/22 1500   Pulse 60 05/26/22 1531   Resp 16 05/26/22 1531   SpO2 96 % 05/26/22 1531         Event Time   Out of Recovery 15:36:31         Pain/Anuja Score: Anuja Score: 10 (5/26/2022  3:31 PM)

## 2022-05-30 LAB — POCT GLUCOSE: 103 MG/DL (ref 70–110)

## 2022-06-03 LAB
FINAL PATHOLOGIC DIAGNOSIS: NORMAL
GROSS: NORMAL
Lab: NORMAL

## 2022-06-12 NOTE — PROGRESS NOTES
Placido your EGD pathology was benign no dysplasia no in H pylori.    Your biopsy show gastric intestinal metaplasia which is a risk factor for stomach cancer but no evidence of cancer or precancer recommend you avoid pickle foods, avoid salty foods, avoid process lunch'n meats, avoid barbecued grilled foods, and try to eat more fresh fruits and vegetables.     1. Gastric antrum, biopsy:       -  Gastric antral id mucosa with findings of reactive gastropathy       -  Routine H&E stain is negative for Helicobacter pylori   Comment:  The specimen shows foveolar hyperplasia with associated lamina   propria edema and vascular ectasia, consistent with reactive gastropathy.   This pattern of injury is often seen in the setting of bile reflux, alcohol   use, stress ulceration and NSAID/aspirin use. There is no evidence of atrophy   or intestinal metaplasia.  The specimen is negative for dysplasia or   malignancy.   2. Insisura, biopsy:       -  Gastric oxyntic and antral mucosa with minimal chronic superficial   gastritis and focal intestinal           metaplasia       -  Immunohistochemical stain with appropriate control is negative for   Helicobacter pylori   Comment:  Negative for dysplasia or malignancy.   3. Gastric body, biopsy:       -  Gastric oxyntic and antral mucosa with mild chronic gastritis and   focal intestinal metaplasia       -  Immunohistochemical stain with appropriate control is negative for   Helicobacter pylori   Comment: Negative for dysplasia or malignancy.   4. Gastric fundus, biopsy:       -  Gastric antral mucosa with mild chronic gastritis       -  Immunohistochemical stain with appropriate control is negative for   Helicobacter pylori   Comment:  Negative for atrophy or intestinal metaplasia.  Negative for   dysplasia or malignancy.   5. Gastric cardia, biopsy:       -  Gastric oxyntic and antral mucosa with mild chronic gastritis       -  Immunohistochemical stain with appropriate control is  negative for   Helicobacter pylori   Comment:  Negative for atrophy or intestinal metaplasia.  Negative for   dysplasia or malignancy.   Comment: Interp By Cristy Mcmahan M.D., Signed on 06/03/2022

## 2022-06-13 ENCOUNTER — HOSPITAL ENCOUNTER (OUTPATIENT)
Dept: RADIOLOGY | Facility: HOSPITAL | Age: 82
Discharge: HOME | End: 2022-06-13
Attending: INTERNAL MEDICINE
Payer: MEDICARE

## 2022-06-13 DIAGNOSIS — C18.7 MALIGNANT NEOPLASM OF SIGMOID COLON: ICD-10-CM

## 2022-06-13 PROCEDURE — 71260 CT THORAX DX C+: CPT | Mod: TC

## 2022-06-13 PROCEDURE — 74177 CT CHEST ABDOMEN PELVIS WITH CONTRAST (XPD): ICD-10-PCS | Mod: 26,,, | Performed by: RADIOLOGY

## 2022-06-13 PROCEDURE — 71260 CT THORAX DX C+: CPT | Mod: 26,,, | Performed by: RADIOLOGY

## 2022-06-13 PROCEDURE — 74177 CT ABD & PELVIS W/CONTRAST: CPT | Mod: 26,,, | Performed by: RADIOLOGY

## 2022-06-13 PROCEDURE — 25500020 PHARM REV CODE 255: Performed by: INTERNAL MEDICINE

## 2022-06-13 PROCEDURE — 71260 CT CHEST ABDOMEN PELVIS WITH CONTRAST (XPD): ICD-10-PCS | Mod: 26,,, | Performed by: RADIOLOGY

## 2022-06-13 PROCEDURE — 74177 CT ABD & PELVIS W/CONTRAST: CPT | Mod: TC

## 2022-06-13 RX ADMIN — IOHEXOL 100 ML: 350 INJECTION, SOLUTION INTRAVENOUS at 09:06

## 2022-06-13 RX ADMIN — IOHEXOL 30 ML: 350 INJECTION, SOLUTION INTRAVENOUS at 09:06

## 2022-06-15 ENCOUNTER — OFFICE VISIT (OUTPATIENT)
Dept: HEMATOLOGY/ONCOLOGY | Facility: CLINIC | Age: 82
End: 2022-06-15
Payer: MEDICARE

## 2022-06-15 VITALS
SYSTOLIC BLOOD PRESSURE: 130 MMHG | HEIGHT: 70 IN | OXYGEN SATURATION: 95 % | TEMPERATURE: 98 F | RESPIRATION RATE: 18 BRPM | HEART RATE: 71 BPM | WEIGHT: 219.13 LBS | DIASTOLIC BLOOD PRESSURE: 70 MMHG | BODY MASS INDEX: 31.37 KG/M2

## 2022-06-15 DIAGNOSIS — C18.7 MALIGNANT NEOPLASM OF SIGMOID COLON: Primary | ICD-10-CM

## 2022-06-15 DIAGNOSIS — F32.A DEPRESSION, UNSPECIFIED DEPRESSION TYPE: ICD-10-CM

## 2022-06-15 PROCEDURE — 3075F SYST BP GE 130 - 139MM HG: CPT | Mod: CPTII,S$GLB,, | Performed by: INTERNAL MEDICINE

## 2022-06-15 PROCEDURE — 3078F PR MOST RECENT DIASTOLIC BLOOD PRESSURE < 80 MM HG: ICD-10-PCS | Mod: CPTII,S$GLB,, | Performed by: INTERNAL MEDICINE

## 2022-06-15 PROCEDURE — 1101F PR PT FALLS ASSESS DOC 0-1 FALLS W/OUT INJ PAST YR: ICD-10-PCS | Mod: CPTII,S$GLB,, | Performed by: INTERNAL MEDICINE

## 2022-06-15 PROCEDURE — 99999 PR PBB SHADOW E&M-EST. PATIENT-LVL V: CPT | Mod: PBBFAC,,, | Performed by: INTERNAL MEDICINE

## 2022-06-15 PROCEDURE — 1159F PR MEDICATION LIST DOCUMENTED IN MEDICAL RECORD: ICD-10-PCS | Mod: CPTII,S$GLB,, | Performed by: INTERNAL MEDICINE

## 2022-06-15 PROCEDURE — 3075F PR MOST RECENT SYSTOLIC BLOOD PRESS GE 130-139MM HG: ICD-10-PCS | Mod: CPTII,S$GLB,, | Performed by: INTERNAL MEDICINE

## 2022-06-15 PROCEDURE — 3288F PR FALLS RISK ASSESSMENT DOCUMENTED: ICD-10-PCS | Mod: CPTII,S$GLB,, | Performed by: INTERNAL MEDICINE

## 2022-06-15 PROCEDURE — 1126F AMNT PAIN NOTED NONE PRSNT: CPT | Mod: CPTII,S$GLB,, | Performed by: INTERNAL MEDICINE

## 2022-06-15 PROCEDURE — 1126F PR PAIN SEVERITY QUANTIFIED, NO PAIN PRESENT: ICD-10-PCS | Mod: CPTII,S$GLB,, | Performed by: INTERNAL MEDICINE

## 2022-06-15 PROCEDURE — 99999 PR PBB SHADOW E&M-EST. PATIENT-LVL V: ICD-10-PCS | Mod: PBBFAC,,, | Performed by: INTERNAL MEDICINE

## 2022-06-15 PROCEDURE — 1101F PT FALLS ASSESS-DOCD LE1/YR: CPT | Mod: CPTII,S$GLB,, | Performed by: INTERNAL MEDICINE

## 2022-06-15 PROCEDURE — 1159F MED LIST DOCD IN RCRD: CPT | Mod: CPTII,S$GLB,, | Performed by: INTERNAL MEDICINE

## 2022-06-15 PROCEDURE — 3078F DIAST BP <80 MM HG: CPT | Mod: CPTII,S$GLB,, | Performed by: INTERNAL MEDICINE

## 2022-06-15 PROCEDURE — 99214 PR OFFICE/OUTPT VISIT, EST, LEVL IV, 30-39 MIN: ICD-10-PCS | Mod: S$GLB,,, | Performed by: INTERNAL MEDICINE

## 2022-06-15 PROCEDURE — 3288F FALL RISK ASSESSMENT DOCD: CPT | Mod: CPTII,S$GLB,, | Performed by: INTERNAL MEDICINE

## 2022-06-15 PROCEDURE — 99214 OFFICE O/P EST MOD 30 MIN: CPT | Mod: S$GLB,,, | Performed by: INTERNAL MEDICINE

## 2022-06-15 NOTE — PROGRESS NOTES
PROGRESS NOTE    Subjective:       Patient ID: Placido Howe is a 81 y.o. male.  MRN: [id number]  : 1940    Chief Complaint: Colon Cancer (Depression,fatigue) and Pain (back)  Sigmoid colon adenocarcinoma, stage III    History of Present Illness:   Placido Howe is a 81 y.o. male who presents with sigmoid colon adenocarcinoma.  He is accompanied by his wife today.       As previously documented, the patient was found to have iron deficiency anemia as well as intermittent diarrhea earlier in . He underwent colonoscopy on 03/10/2021 for further evaluation and a mass was found in distal descending colon.  He also reported a weight loss of about 25 lb over the past 1 year.      He has other medical issues including a history of DVT, PE, status post IVC filter.  He also has a permanent pacemaker, hypertension, hyperlipidemia, diabetes mellitus.     Staging workup was negative.  He saw Dr. Mayo and underwent a colon resection and lymph node evaluation on 2021.  Tumor was 2.2 cm, one of 23 lymph nodes were positive for adenocarcinoma, he was MSI stable.     His postop course was complicated by recurrent colitis.  He was admitted to [facility] from 4/10/21-21 for colitis.  C diff was negative at that time.  He was treated with ciprofloxacin and metronidazole.   He was admitted to the hospital again  from 21-21 when he was found to have C diff colitis.  He was initially treated with metronidazole and vancomycin and discharged home to complete 2.5 weeks of vancomycin.     Interim history:  He presents today to discuss his lab, scans, symptoms and further recommendations.  He had colonoscopy on 22, showed diverticulosis, no polyps. Repeat in 3 years. EGD showed gastritis, Protonix started. Biopsy negative for malignancy.     He has completed PT and is now exercising at the gym at this time.  He reports worsening  depression, loss of appetite. Zoloft was recently increased to 100 mg by his PMD but he does not feel it is helping.     Her reports intermittent diarrhea controlled with 2 imodium a day.       Oncology History:  Oncology History   Colon cancer   3/31/2021 Initial Diagnosis    Colon cancer     5/19/2021 Cancer Staged    Staging form: Colon and Rectum, AJCC 8th Edition  - Clinical: Stage IIIB (cT3, cN1, cM0)       3/31/21  Path:  1. SIGMOID COLON, RESECTION:   Adenocarcinoma, moderately differentiated, 2.2cm   Tumor invades subserosal adipose tissue   Margins are all negative   One of twenty three lymph nodes positive for metastatic adenocarcinoma (1/23  Pathologic Staging - p T3 N1a Mx   History:  Past Medical History:   Diagnosis Date    Alopecia     Anemia     Anxiety     Arthritis     Colon cancer     sigmoid adenocarcinoma s/p sigmoid colectomy 3/31/21    Deep vein thrombosis     Depression     Diabetes mellitus     Hx of psychiatric care     Hyperlipidemia     Hypertension     Hypertension 4/3/2014    Impaired glucose tolerance 4/3/2014    Obesity (BMI 30-39.9) 4/3/2014    Pulmonary embolism     S/P parathyroidectomy 8/29/2014    Therapy     Type 2 diabetes mellitus, uncontrolled 4/7/2014    Vitamin B12 deficiency 7/10/2014    Vitamin D deficiency 7/10/2014      Past Surgical History:   Procedure Laterality Date    ABDOMINAL SURGERY      CARDIAC PACEMAKER PLACEMENT      CARDIAC PACEMAKER PLACEMENT      CARDIAC PACEMAKER PLACEMENT      CARPAL TUNNEL RELEASE  2013    right hand    carpel tunnel      right hand    CATARACT EXTRACTION  2001    left and right eyes    CHOLECYSTECTOMY      COLONOSCOPY N/A 3/10/2021    Procedure: COLONOSCOPY;  Surgeon: Iván Colunga MD;  Location: [address]);  Service: Endoscopy;  Laterality: N/A;  ok to hold xarelto x3 days per Dr. Brewer, see media 12/23-KPvt  medtronic pacemaker  C-Diff negative - see micro - ERW  COVID + on 1/22/21 , per  endo protocol, no further testing needed - ERW    COLONOSCOPY N/A 5/26/2022    Procedure: COLONOSCOPY;  Surgeon: Iván Colunga MD;  Location: [facility] (4TH FLR);  Service: Endoscopy;  Laterality: N/A;    ENDOSCOPIC ULTRASOUND OF UPPER GASTROINTESTINAL TRACT N/A 2/24/2021    Procedure: ULTRASOUND, UPPER GI TRACT, ENDOSCOPIC;  Surgeon: Alessio Wilde MD;  Location: [facility];  Service: Endoscopy;  Laterality: N/A;  Upper endoscopy the pancreas for evaluation of weight loss diarrhea for a year early satiety anorexia and family history of her first-degree relative with pancreatic cancer his father at age 63    ESOPHAGOGASTRODUODENOSCOPY  02/24/2021    ESOPHAGOGASTRODUODENOSCOPY N/A 2/24/2021    Procedure: EGD (ESOPHAGOGASTRODUODENOSCOPY);  Surgeon: Alessio Wilde MD;  Location: [facility];  Service: Endoscopy;  Laterality: N/A;  Please rule out celiac sprue/NO covid needed MP    ESOPHAGOGASTRODUODENOSCOPY N/A 3/10/2021    Procedure: EGD (ESOPHAGOGASTRODUODENOSCOPY);  Surgeon: Iván Colunga MD;  Location: [facility] (4TH FLR);  Service: Endoscopy;  Laterality: N/A;  Will need to test for gastric pH day of case.  Patient needs to be Off all Proton Pump Inhibitors and H2 blocker medications for 2 (Two) weeks prior to EGD.  Nexium (esomeprazole)  Prilosec (omeprazole)   Protonix (pantoprazole)  Prevacid (lansoprazole    ESOPHAGOGASTRODUODENOSCOPY N/A 5/26/2022    Procedure: EGD (ESOPHAGOGASTRODUODENOSCOPY);  Surgeon: Iván Colunga MD;  Location: [facility] (4TH FLR);  Service: Endoscopy;  Laterality: N/A;  Fully Vaccinated, Cardiac clearance and Hold Xarelto x 2 days per Dr. Garcia see media 4/7/22, Medtronic Pacemaker, prep instr mailed and portal -ml    FLEXIBLE SIGMOIDOSCOPY N/A 3/31/2021    Procedure: SIGMOIDOSCOPY, FLEXIBLE;  Surgeon: JOHNNY Mayo MD;  Location: [facility] OR 2ND FLR;  Service: Colon and Rectal;  Laterality: N/A;    IVC FILTER RETRIEVAL      JOINT REPLACEMENT      quincy knees    KNEE  ARTHROSCOPY W/ MENISCAL REPAIR      right    LAPAROSCOPIC SIGMOIDECTOMY N/A 3/31/2021    Procedure: COLECTOMY, SIGMOID, LAPAROSCOPIC, ERAS high;  Surgeon: JOHNNY Mayo MD;  Location: [facility] OR 2ND FLR;  Service: Colon and Rectal;  Laterality: N/A;    MOBILIZATION OF SPLENIC FLEXURE N/A 3/31/2021    Procedure: MOBILIZATION, SPLENIC FLEXURE;  Surgeon: JOHNNY Mayo MD;  Location: [facility] OR 2ND FLR;  Service: Colon and Rectal;  Laterality: N/A;    THYROID SURGERY  08/2014    TONSILLECTOMY      when pt at 3 y/o    Upper EUS  02/24/2021     Family History   Problem Relation Age of Onset    Rheum arthritis Mother     Diabetes Mellitus Mother     Alzheimer's disease Mother     Cancer Father     Liver cancer Father 63        Possibly pancreatic cancer mets to the liver    Pancreatic cancer Father 63    Cancer Brother 8        ALL    Leukemia Brother     Breast cancer Sister     Celiac disease Neg Hx     Cirrhosis Neg Hx     Colon cancer Neg Hx     Colon polyps Neg Hx     Esophageal cancer Neg Hx     Stomach cancer Neg Hx     Ulcerative colitis Neg Hx      Social History     Tobacco Use    Smoking status: Never Smoker    Smokeless tobacco: Never Used    Tobacco comment: 5 cigars a year   Substance and Sexual Activity    Alcohol use: Yes     Comment: SOCIALLY    Drug use: No    Sexual activity: Yes     Partners: Female        ROS:   Review of Systems   Constitutional: Negative for fever, malaise/fatigue and weight loss.   HENT: Negative for congestion, ear pain, nosebleeds and sore throat.    Eyes: Negative for blurred vision, double vision and photophobia.   Respiratory: Negative for cough, hemoptysis, sputum production, shortness of breath, wheezing and stridor.    Cardiovascular: Negative for chest pain, palpitations, orthopnea and leg swelling.   Gastrointestinal: Positive for diarrhea. Negative for abdominal pain, blood in stool, constipation, heartburn, melena, nausea and vomiting.  "       Loss of appetite    Genitourinary: Negative for dysuria and hematuria.   Musculoskeletal: Positive for back pain. Negative for myalgias and neck pain.   Skin: Negative for itching and rash.   Neurological: Negative for dizziness, focal weakness, seizures, weakness and headaches.   Endo/Heme/Allergies: Negative for polydipsia. Does not bruise/bleed easily.   Psychiatric/Behavioral: Positive for depression. Negative for memory loss. The patient is nervous/anxious. The patient does not have insomnia.         Objective:     Vitals:    06/15/22 1018   BP: 130/70   Pulse: 71   Resp: 18   Temp: 97.8 °F (36.6 °C)   TempSrc: Oral   SpO2: 95%   Weight: 99.4 kg (219 lb 2.2 oz)   Height: 5' 10" (1.778 m)   PainSc: 0-No pain     Wt Readings from Last 10 Encounters:   06/15/22 99.4 kg (219 lb 2.2 oz)   05/26/22 95.3 kg (210 lb)   03/15/22 100.7 kg (222 lb 0.1 oz)   12/01/21 97.3 kg (214 lb 6.4 oz)   08/25/21 96.2 kg (212 lb 1.3 oz)   05/19/21 96.8 kg (213 lb 6.5 oz)   05/13/21 96.1 kg (211 lb 13.8 oz)   05/02/21 99.3 kg (219 lb)   04/15/21 96.6 kg (212 lb 13.7 oz)   04/11/21 97.5 kg (215 lb)       Physical Examination:   Physical Exam  Vitals and nursing note reviewed.   Constitutional:       General: He is not in acute distress.     Appearance: He is not diaphoretic.   HENT:      Head: Normocephalic.      Mouth/Throat:      Pharynx: No oropharyngeal exudate.   Eyes:      General: No scleral icterus.     Conjunctiva/sclera: Conjunctivae normal.   Neck:      Thyroid: No thyromegaly.   Cardiovascular:      Rate and Rhythm: Normal rate and regular rhythm.      Heart sounds: Normal heart sounds. No murmur heard.  Pulmonary:      Effort: Pulmonary effort is normal. No respiratory distress.      Breath sounds: No stridor. No wheezing or rales.   Chest:      Chest wall: No tenderness.   Abdominal:      General: Bowel sounds are normal. There is no distension.      Palpations: Abdomen is soft. There is no mass.      Tenderness: " There is no abdominal tenderness. There is no rebound.   Musculoskeletal:         General: No tenderness or deformity. Normal range of motion.      Cervical back: Neck supple.   Lymphadenopathy:      Cervical: No cervical adenopathy.   Skin:     General: Skin is warm and dry.      Findings: No erythema or rash.   Neurological:      Mental Status: He is alert and oriented to person, place, and time.      Cranial Nerves: No cranial nerve deficit.      Coordination: Coordination normal.      Gait: Gait is intact.      Comments: Ambulates with a cane    Psychiatric:         Mood and Affect: Affect normal.         Cognition and Memory: Memory normal.         Judgment: Judgment normal.          Diagnostic Tests:  Significant Imaging: I have reviewed and interpreted all pertinent imaging results/findings.      Laboratory Data:  All pertinent labs have been reviewed.  Labs:   Lab Results   Component Value Date    WBC 5.09 06/13/2022    RBC 3.88 (L) 06/13/2022    HGB 12.3 (L) 06/13/2022    HCT 37.1 (L) 06/13/2022    MCV 96 06/13/2022     (L) 06/13/2022    GLU 96 06/13/2022     06/13/2022    K 4.3 06/13/2022    BUN 20 06/13/2022    CREATININE 0.9 06/13/2022    AST 29 06/13/2022    ALT 21 06/13/2022    BILITOT 1.0 06/13/2022       Assessment/Plan:   Malignant neoplasm of sigmoid colon  pT3N1a, Stage III   He is status post resection.  Adjuvant chemotherapy for stage III disease was discussed at length with the patient and his family during the initial visit.  See  consult for details.  He declined adjuvant chemotherapy.  Labs, colonoscopy results and scan were discussed.  There is no evidence for recurrent or metastatic disease.  Labs are stable.  CEA is normal.        We will continue active surveillance.    We will continue to see him every 3-6 months, with CT scans done every 6 months for 3-5  Years.  CTs will be done prior to his next visit.    -     CBC Auto Differential; Standing  -     CMP; Future; Expected  date: 03/15/2022  -     CEA; Future; Expected date: 03/15/2022  -     CT Chest Abdomen Pelvis With Contrast; Future; Expected date: 03/15/2022    Pernicious anemia  Continue B12 injections with PMD.    Diarrhea, unspecified type  Continue Imodium as needed.  Continue GI follow-up.    Moderate episode of recurrent major depressive disorder  Continue Zoloft.  Referred to Psychiatry per patient's family request.      ECOG SCORE    1 - Restricted in strenuous activity-ambulatory and able to carry out work of a light nature           Discussion:   No follow-ups on file.    Plan was discussed with the patient at length, and he verbalized understanding. Placido was given an opportunity to ask questions that were answered to his satisfaction, and he was advised to call in the interval if any problems or questions arise.    Electronically signed by Tasha Venegas MD        Route Chart for Scheduling    Med Onc Chart Routing      Follow up with physician 6 months. Needs Psychiatry referral at this time    Follow up with [facility]    Labs CMP, CBC and CEA   Lab interval:  Labs just before visit, in [place]    Imaging CT chest abdomen pelvis   Just before visit, in [place]    Pharmacy appointment    Other referrals

## 2022-09-02 DIAGNOSIS — K31.A0 GASTRIC INTESTINAL METAPLASIA: ICD-10-CM

## 2022-09-02 DIAGNOSIS — Z79.01 CURRENT USE OF LONG TERM ANTICOAGULATION: ICD-10-CM

## 2022-09-02 RX ORDER — PANTOPRAZOLE SODIUM 40 MG/1
40 TABLET, DELAYED RELEASE ORAL
Qty: 90 TABLET | Refills: 0 | Status: SHIPPED | OUTPATIENT
Start: 2022-09-02 | End: 2022-12-22

## 2022-10-06 ENCOUNTER — PATIENT MESSAGE (OUTPATIENT)
Dept: RHEUMATOLOGY | Facility: CLINIC | Age: 82
End: 2022-10-06
Payer: MEDICARE

## 2022-10-12 ENCOUNTER — TELEPHONE (OUTPATIENT)
Dept: ORTHOPEDICS | Facility: CLINIC | Age: 82
End: 2022-10-12
Payer: MEDICARE

## 2022-10-12 NOTE — TELEPHONE ENCOUNTER
Left message for patient informing about xray prior to appointment time.  Also need to know what part of back is hurting for order to get put in.

## 2022-10-13 ENCOUNTER — TELEPHONE (OUTPATIENT)
Dept: ORTHOPEDICS | Facility: CLINIC | Age: 82
End: 2022-10-13
Payer: MEDICARE

## 2022-10-13 ENCOUNTER — OFFICE VISIT (OUTPATIENT)
Dept: ORTHOPEDICS | Facility: CLINIC | Age: 82
End: 2022-10-13
Payer: MEDICARE

## 2022-10-13 ENCOUNTER — HOSPITAL ENCOUNTER (OUTPATIENT)
Dept: RADIOLOGY | Facility: HOSPITAL | Age: 82
Discharge: HOME | End: 2022-10-13
Attending: ORTHOPAEDIC SURGERY
Payer: MEDICARE

## 2022-10-13 VITALS — WEIGHT: 217.63 LBS | HEIGHT: 70 IN | BODY MASS INDEX: 31.16 KG/M2

## 2022-10-13 DIAGNOSIS — M51.36 DDD (DEGENERATIVE DISC DISEASE), LUMBAR: ICD-10-CM

## 2022-10-13 DIAGNOSIS — M54.9 DORSALGIA, UNSPECIFIED: ICD-10-CM

## 2022-10-13 DIAGNOSIS — M51.36 DDD (DEGENERATIVE DISC DISEASE), LUMBAR: Primary | ICD-10-CM

## 2022-10-13 DIAGNOSIS — M47.816 LUMBAR SPONDYLOSIS: ICD-10-CM

## 2022-10-13 DIAGNOSIS — M41.50 DEGENERATIVE SCOLIOSIS: Primary | ICD-10-CM

## 2022-10-13 PROCEDURE — 72110 XR LUMBAR SPINE AP AND LAT WITH FLEX/EXT: ICD-10-PCS | Mod: 26,,, | Performed by: RADIOLOGY

## 2022-10-13 PROCEDURE — 1101F PR PT FALLS ASSESS DOC 0-1 FALLS W/OUT INJ PAST YR: ICD-10-PCS | Mod: CPTII,S$GLB,, | Performed by: ORTHOPAEDIC SURGERY

## 2022-10-13 PROCEDURE — 72110 X-RAY EXAM L-2 SPINE 4/>VWS: CPT | Mod: 26,,, | Performed by: RADIOLOGY

## 2022-10-13 PROCEDURE — 99999 PR PBB SHADOW E&M-EST. PATIENT-LVL IV: CPT | Mod: PBBFAC,,, | Performed by: ORTHOPAEDIC SURGERY

## 2022-10-13 PROCEDURE — 1101F PT FALLS ASSESS-DOCD LE1/YR: CPT | Mod: CPTII,S$GLB,, | Performed by: ORTHOPAEDIC SURGERY

## 2022-10-13 PROCEDURE — 1125F PR PAIN SEVERITY QUANTIFIED, PAIN PRESENT: ICD-10-PCS | Mod: CPTII,S$GLB,, | Performed by: ORTHOPAEDIC SURGERY

## 2022-10-13 PROCEDURE — 72110 X-RAY EXAM L-2 SPINE 4/>VWS: CPT | Mod: TC

## 2022-10-13 PROCEDURE — 1159F MED LIST DOCD IN RCRD: CPT | Mod: CPTII,S$GLB,, | Performed by: ORTHOPAEDIC SURGERY

## 2022-10-13 PROCEDURE — 99204 PR OFFICE/OUTPT VISIT, NEW, LEVL IV, 45-59 MIN: ICD-10-PCS | Mod: S$GLB,,, | Performed by: ORTHOPAEDIC SURGERY

## 2022-10-13 PROCEDURE — 1160F RVW MEDS BY RX/DR IN RCRD: CPT | Mod: CPTII,S$GLB,, | Performed by: ORTHOPAEDIC SURGERY

## 2022-10-13 PROCEDURE — 3288F PR FALLS RISK ASSESSMENT DOCUMENTED: ICD-10-PCS | Mod: CPTII,S$GLB,, | Performed by: ORTHOPAEDIC SURGERY

## 2022-10-13 PROCEDURE — 1159F PR MEDICATION LIST DOCUMENTED IN MEDICAL RECORD: ICD-10-PCS | Mod: CPTII,S$GLB,, | Performed by: ORTHOPAEDIC SURGERY

## 2022-10-13 PROCEDURE — 1160F PR REVIEW ALL MEDS BY PRESCRIBER/CLIN PHARMACIST DOCUMENTED: ICD-10-PCS | Mod: CPTII,S$GLB,, | Performed by: ORTHOPAEDIC SURGERY

## 2022-10-13 PROCEDURE — 1125F AMNT PAIN NOTED PAIN PRSNT: CPT | Mod: CPTII,S$GLB,, | Performed by: ORTHOPAEDIC SURGERY

## 2022-10-13 PROCEDURE — 99204 OFFICE O/P NEW MOD 45 MIN: CPT | Mod: S$GLB,,, | Performed by: ORTHOPAEDIC SURGERY

## 2022-10-13 PROCEDURE — 3288F FALL RISK ASSESSMENT DOCD: CPT | Mod: CPTII,S$GLB,, | Performed by: ORTHOPAEDIC SURGERY

## 2022-10-13 PROCEDURE — 99999 PR PBB SHADOW E&M-EST. PATIENT-LVL IV: ICD-10-PCS | Mod: PBBFAC,,, | Performed by: ORTHOPAEDIC SURGERY

## 2022-10-13 NOTE — PROGRESS NOTES
DATE: 10/13/2022  PATIENT: Placido Howe    Attending Physician: Jl Eli M.D.    CHIEF COMPLAINT: LBP    HISTORY:  Placido Howe is a 81 y.o. male w/ a hx of DVT (on Xarelto) presents for initial evaluation of low back pain (Back - 7). The pain has been present for 4-5 years. The patient describes the pain as dull but it does not down his legs (it used to).  The pain is worse with activity and improved by rest. There is no associated numbness and tingling. There is no subjective weakness. Prior treatments have included meds (tylenol and norco), injections (BENITO, RFAs and others at outside Pain Management; 6x from Feb-July 2022), but no PT or surgery    The Patient denies myelopathic symptoms such as handwriting changes or difficulty with buttons/coins/keys. Denies perineal paresthesias, bowel/bladder dysfunction.    The patient does not smoke, have DM or endorse IVDU. The patient takes Xarelto for DVT. He is a retired electrical salesman and HS .    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    Deep vein thrombosis     Depression     Diabetes mellitus     Hx of psychiatric care     Hyperlipidemia     Hypertension     Hypertension 4/3/2014    Impaired glucose tolerance 4/3/2014    Obesity (BMI 30-39.9) 4/3/2014    Pulmonary embolism     S/P parathyroidectomy 8/29/2014    Therapy     Type 2 diabetes mellitus, uncontrolled 4/7/2014    Vitamin B12 deficiency 7/10/2014    Vitamin D deficiency 7/10/2014     Past Surgical History:   Procedure Laterality Date    ABDOMINAL SURGERY      CARDIAC PACEMAKER PLACEMENT      CARDIAC PACEMAKER PLACEMENT      CARDIAC PACEMAKER PLACEMENT      CARPAL TUNNEL RELEASE  2013    right hand    carpel tunnel      right hand    CATARACT EXTRACTION  2001    left and right eyes    CHOLECYSTECTOMY       COLONOSCOPY N/A 3/10/2021    Procedure: COLONOSCOPY;  Surgeon: Iván Colunga MD;  Location: [facility] (4TH FLR);  Service: Endoscopy;  Laterality: N/A;  ok to hold xarelto x3 days per Dr. Brewer, see media 12/23-KPvt  medtronic pacemaker  C-Diff negative - see micro - ERW  COVID + on 1/22/21 , per endo protocol, no further testing needed - ERW    COLONOSCOPY N/A 5/26/2022    Procedure: COLONOSCOPY;  Surgeon: Iván Colunga MD;  Location: [facility] (4TH FLR);  Service: Endoscopy;  Laterality: N/A;    ENDOSCOPIC ULTRASOUND OF UPPER GASTROINTESTINAL TRACT N/A 2/24/2021    Procedure: ULTRASOUND, UPPER GI TRACT, ENDOSCOPIC;  Surgeon: Alessio Wilde MD;  Location: [facility];  Service: Endoscopy;  Laterality: N/A;  Upper endoscopy the pancreas for evaluation of weight loss diarrhea for a year early satiety anorexia and family history of her first-degree relative with pancreatic cancer his father at age 63    ESOPHAGOGASTRODUODENOSCOPY  02/24/2021    ESOPHAGOGASTRODUODENOSCOPY N/A 2/24/2021    Procedure: EGD (ESOPHAGOGASTRODUODENOSCOPY);  Surgeon: Alessio Wilde MD;  Location: [facility];  Service: Endoscopy;  Laterality: N/A;  Please rule out celiac sprue/NO covid needed MP    ESOPHAGOGASTRODUODENOSCOPY N/A 3/10/2021    Procedure: EGD (ESOPHAGOGASTRODUODENOSCOPY);  Surgeon: Iván Colunga MD;  Location: [facility] (4TH FLR);  Service: Endoscopy;  Laterality: N/A;  Will need to test for gastric pH day of case.  Patient needs to be Off all Proton Pump Inhibitors and H2 blocker medications for 2 (Two) weeks prior to EGD.  Nexium (esomeprazole)  Prilosec (omeprazole)   Protonix (pantoprazole)  Prevacid (lansoprazole    ESOPHAGOGASTRODUODENOSCOPY N/A 5/26/2022    Procedure: EGD (ESOPHAGOGASTRODUODENOSCOPY);  Surgeon: Iván Colunga MD;  Location: [facility] (4TH FLR);  Service: Endoscopy;  Laterality: N/A;  Fully Vaccinated, Cardiac clearance and Hold Xarelto x 2 days per Dr. Garcia see media 4/7/22, Medtronic  Pacemaker, prep instr mailed and portal -ml    FLEXIBLE SIGMOIDOSCOPY N/A 3/31/2021    Procedure: SIGMOIDOSCOPY, FLEXIBLE;  Surgeon: JOHNNY Mayo MD;  Location: [facility] OR 2ND FLR;  Service: Colon and Rectal;  Laterality: N/A;    IVC FILTER RETRIEVAL      JOINT REPLACEMENT      [facility]    KNEE ARTHROSCOPY W/ MENISCAL REPAIR      right    LAPAROSCOPIC SIGMOIDECTOMY N/A 3/31/2021    Procedure: COLECTOMY, SIGMOID, LAPAROSCOPIC, ERAS high;  Surgeon: JOHNNY Mayo MD;  Location: [facility] OR 2ND FLR;  Service: Colon and Rectal;  Laterality: N/A;    MOBILIZATION OF SPLENIC FLEXURE N/A 3/31/2021    Procedure: MOBILIZATION, SPLENIC FLEXURE;  Surgeon: JOHNNY Mayo MD;  Location: [facility] OR 2ND FLR;  Service: Colon and Rectal;  Laterality: N/A;    THYROID SURGERY  08/2014    TONSILLECTOMY      when pt at 3 y/o    Upper EUS  02/24/2021       Current Medications:   Current Outpatient Medications:     acetaminophen (TYLENOL) 325 MG tablet, Take 2 tablets (650 mg total) by mouth every 4 (four) hours as needed for Temperature greater than (temp > 100)., Disp: , Rfl: 0    alendronate (FOSAMAX) 70 MG tablet, TAKE ONE TABLET BY MOUTH EVERY 7 DAYS, Disp: 4 tablet, Rfl: 1    allopurinol (ZYLOPRIM) 300 MG tablet, Take 300 mg by mouth every morning. , Disp: , Rfl:     alprazolam (XANAX) 0.5 MG tablet, Take 0.5 mg by mouth 3 (three) times daily as needed., Disp: , Rfl: 1    aspirin (ECOTRIN) 81 MG EC tablet, Take 81 mg by mouth once daily. PATIENT TAKES 3 X WEEKLY (M-W-F), Disp: , Rfl:     atorvastatin (LIPITOR) 40 MG tablet, Take 40 mg by mouth every evening. , Disp: , Rfl:     BENADRYL 25 mg capsule, Take 25 mg by mouth nightly as needed., Disp: , Rfl:     FEROSUL 325 mg (65 mg iron) Tab tablet, Take 1 tablet (325 mg total) by mouth every 12 (twelve) hours., Disp: 60 tablet, Rfl: 4    IMODIUM A-D 2 mg Tab, Take 2 mg by mouth 2 (two) times daily., Disp: , Rfl:     NITROSTAT 0.4 mg SL tablet, , Disp: , Rfl: 2    pantoprazole  (PROTONIX) 40 MG tablet, Take 1 tablet (40 mg total) by mouth before breakfast., Disp: 90 tablet, Rfl: 0    pioglitazone (ACTOS) 30 MG tablet, Take 30 mg by mouth every morning. , Disp: , Rfl:     sertraline (ZOLOFT) 100 MG tablet, Take 100 mg by mouth nightly., Disp: , Rfl:     sildenafiL (VIAGRA) 100 MG tablet, Take 100 mg by mouth as needed. as directed., Disp: , Rfl:     sodium,potassium,mag sulfates (SUPREP BOWEL PREP KIT) 17.5-3.13-1.6 gram SolR, Take 177 mLs by mouth once daily., Disp: 1 kit, Rfl: 0    sotalol (BETAPACE) 120 MG Tab, Take 80 mg by mouth every 12 (twelve) hours., Disp: , Rfl:     VITAMIN C 250 MG tablet, Take 250 mg by mouth 2 (two) times daily., Disp: , Rfl:     XARELTO 10 mg Tab, TAKE ONE TABLET BY MOUTH EVERY EVENING WITH FOOD, Disp: , Rfl: 2    spironolactone (ALDACTONE) 25 MG tablet, Take 1 tablet (25 mg total) by mouth every evening., Disp: 30 tablet, Rfl: 11    Social History:   Social History     Socioeconomic History    Marital status:      Spouse name: Ceci    Number of children: 3   Tobacco Use    Smoking status: Never    Smokeless tobacco: Never    Tobacco comments:     5 cigars a year   Substance and Sexual Activity    Alcohol use: Yes     Comment: SOCIALLY    Drug use: No    Sexual activity: Yes     Partners: Female   Social History Narrative    Retired  and oil-field salesman    2 daughters (Yovana-[place], Angela-[place]), 1 son ([place])     58 years       REVIEW OF SYSTEMS:  Constitution: Negative. Negative for chills, fever and night sweats.   Cardiovascular: Negative for chest pain and syncope.   Respiratory: Negative for cough and shortness of breath.   Gastrointestinal: See HPI. Negative for nausea/vomiting. Negative for abdominal pain.  Genitourinary: See HPI. Negative for discoloration or dysuria.  Skin: Negative for dry skin, itching and rash.   Hematologic/Lymphatic: negative for bleeding/clotting disorders.   Musculoskeletal: Negative  "for falls and muscle weakness.   Neurological: See HPI. no history of seizures. no history of cranial surgery or shunts.  Endocrine: Negative for polydipsia, polyphagia and polyuria.   Allergic/Immunologic: Negative for hives and persistent infections.    PHYSICAL EXAMINATION:    Ht 5' 10" (1.778 m)   Wt 98.7 kg (217 lb 9.5 oz)   BMI 31.22 kg/m²     General: The patient is a 81 y.o. male in no apparent distress, the patient is orientatied to person, place and time.   Psych: Normal mood and affect  HEENT: Vision grossly intact, hearing intact to the spoken word.  Lungs: Respirations unlabored.  Gait: Normal station and gait, no difficulty with toe or heel walk.   Skin: Dorsal lumbar skin negative for rashes, lesions, hairy patches and surgical scars.  Range of motion: Lumbar range of motion is acceptable. There is lower lumbar tenderness to palpation.  Spinal Balance: Global saggital and coronal spinal balance acceptable, no significant for scoliosis and kyphosis.  Musculoskeletal: No pain with the range of motion of the bilateral hips. No trochanteric tenderness to palpation.  Vascular: Bilateral lower extremities warm and well perfused, Dorsalis pedis pulses 2+ bilaterally.  Neurological: Normal strength and tone in all major motor groups in the bilateral lower extremities. Normal sensation to light touch in the L2-S1 dermatomes bilaterally.  Deep tendon reflexes symmetric 2+ in the bilateral lower extremities.  Negative Babinski bilaterally.    IMAGING:   Today I independently reviewed the following images and my interpretations are as follows:    AP, Lat and Flex/Ex upright L-spine films demonstrate significant lumbar spondylosis and DDD without listhesis.     Body mass index is 31.22 kg/m².  Hemoglobin A1C   Date Value Ref Range Status   04/01/2021 5.8 (H) 4.0 - 5.6 % Final     Comment:     ADA Screening Guidelines:  5.7-6.4%  Consistent with prediabetes  >or=6.5%  Consistent with diabetes    High levels of " fetal hemoglobin interfere with the HbA1C  assay. Heterozygous hemoglobin variants (HbS, HgC, etc)do  not significantly interfere with this assay.   However, presence of multiple variants may affect accuracy.     12/03/2020 5.9 (H) 4.0 - 5.6 % Final     Comment:     ADA Screening Guidelines:  5.7-6.4%  Consistent with prediabetes  >or=6.5%  Consistent with diabetes  High levels of fetal hemoglobin interfere with the HbA1C  assay. Heterozygous hemoglobin variants (HbS, HgC, etc)do  not significantly interfere with this assay.   However, presence of multiple variants may affect accuracy.     10/05/2016 6.8 (H) 4.5 - 6.2 % Final     Comment:     According to ADA guidelines, hemoglobin A1C <7.0% represents  optimal control in non-pregnant diabetic patients.  Different  metrics may apply to specific populations.   Standards of Medical Care in Diabetes - 2016.  For the purpose of screening for the presence of diabetes:  <5.7%     Consistent with the absence of diabetes  5.7-6.4%  Consistent with increasing risk for diabetes   (prediabetes)  >or=6.5%  Consistent with diabetes  Currently no consensus exists for use of hemoglobin A1C  for diagnosis of diabetes for children.           ASSESSMENT/PLAN:    Placido was seen today for low-back pain.    Diagnoses and all orders for this visit:    Degenerative scoliosis    Lumbar spondylosis  -     Ambulatory referral/consult to Physical/Occupational Therapy; Future    DDD (degenerative disc disease), lumbar    Dorsalgia, unspecified  -     MRI Lumbar Spine Without Contrast; Future      Follow up in about 4 weeks (around 11/10/2022).    Patient has lumbar spondylosis. I discussed the natural history of their diagnoses as well as surgical and nonsurgical treatment options. I educated the patient on the importance of core/back strengthening, correct posture, bending/lifting ergonomics, and low-impact aerobic exercises (walking, elliptical, and aquatherapy). Continue medications. I  will refer the patient to PT for core/back strengthening. I ordered lumbar MRI to evaluate stenosis. Patient will follow up in 4 weeks for MRI review.    Jl Eli MD  Orthopaedic Spine Surgeon  Department of Orthopaedic Surgery  [phone]

## 2022-10-13 NOTE — TELEPHONE ENCOUNTER
----- Message from Rah Miramontes sent at 10/13/2022  9:14 AM CDT -----  Regarding: MESSAGE, X-RAYS  Contact: CAREGIVER - Ceci Howe  Caregiver stated they just received a message from staff Marilu Mckeon regarding x-rays prior to the appointment Caregiver ask if she need to bring the x-rays from [place] or will the pt have to take new x-rays ask for a call back      Contact info   [phone] (home)

## 2022-10-19 ENCOUNTER — LAB VISIT (OUTPATIENT)
Dept: LAB | Facility: HOSPITAL | Age: 82
End: 2022-10-19
Attending: INTERNAL MEDICINE
Payer: MEDICARE

## 2022-10-19 ENCOUNTER — OFFICE VISIT (OUTPATIENT)
Dept: RHEUMATOLOGY | Facility: CLINIC | Age: 82
End: 2022-10-19
Payer: MEDICARE

## 2022-10-19 VITALS
WEIGHT: 222.88 LBS | HEART RATE: 62 BPM | BODY MASS INDEX: 31.98 KG/M2 | SYSTOLIC BLOOD PRESSURE: 163 MMHG | DIASTOLIC BLOOD PRESSURE: 80 MMHG

## 2022-10-19 DIAGNOSIS — C18.9 MALIGNANT NEOPLASM OF COLON, UNSPECIFIED PART OF COLON: Chronic | ICD-10-CM

## 2022-10-19 DIAGNOSIS — M11.20 CHONDROCALCINOSIS: ICD-10-CM

## 2022-10-19 DIAGNOSIS — M15.9 GENERALIZED OSTEOARTHRITIS: Primary | Chronic | ICD-10-CM

## 2022-10-19 DIAGNOSIS — M15.9 GENERALIZED OSTEOARTHRITIS: Chronic | ICD-10-CM

## 2022-10-19 LAB
CRP SERPL-MCNC: 1.3 MG/L (ref 0–8.2)
ERYTHROCYTE [SEDIMENTATION RATE] IN BLOOD BY PHOTOMETRIC METHOD: 34 MM/HR (ref 0–23)

## 2022-10-19 PROCEDURE — 3079F DIAST BP 80-89 MM HG: CPT | Mod: CPTII,S$GLB,, | Performed by: INTERNAL MEDICINE

## 2022-10-19 PROCEDURE — 85652 RBC SED RATE AUTOMATED: CPT | Performed by: INTERNAL MEDICINE

## 2022-10-19 PROCEDURE — 99999 PR PBB SHADOW E&M-EST. PATIENT-LVL III: CPT | Mod: PBBFAC,,, | Performed by: INTERNAL MEDICINE

## 2022-10-19 PROCEDURE — 36415 COLL VENOUS BLD VENIPUNCTURE: CPT | Performed by: INTERNAL MEDICINE

## 2022-10-19 PROCEDURE — 1159F MED LIST DOCD IN RCRD: CPT | Mod: CPTII,S$GLB,, | Performed by: INTERNAL MEDICINE

## 2022-10-19 PROCEDURE — 3077F SYST BP >= 140 MM HG: CPT | Mod: CPTII,S$GLB,, | Performed by: INTERNAL MEDICINE

## 2022-10-19 PROCEDURE — 99214 PR OFFICE/OUTPT VISIT, EST, LEVL IV, 30-39 MIN: ICD-10-PCS | Mod: S$GLB,,, | Performed by: INTERNAL MEDICINE

## 2022-10-19 PROCEDURE — 1125F PR PAIN SEVERITY QUANTIFIED, PAIN PRESENT: ICD-10-PCS | Mod: CPTII,S$GLB,, | Performed by: INTERNAL MEDICINE

## 2022-10-19 PROCEDURE — 3077F PR MOST RECENT SYSTOLIC BLOOD PRESSURE >= 140 MM HG: ICD-10-PCS | Mod: CPTII,S$GLB,, | Performed by: INTERNAL MEDICINE

## 2022-10-19 PROCEDURE — 99214 OFFICE O/P EST MOD 30 MIN: CPT | Mod: S$GLB,,, | Performed by: INTERNAL MEDICINE

## 2022-10-19 PROCEDURE — 1125F AMNT PAIN NOTED PAIN PRSNT: CPT | Mod: CPTII,S$GLB,, | Performed by: INTERNAL MEDICINE

## 2022-10-19 PROCEDURE — 3079F PR MOST RECENT DIASTOLIC BLOOD PRESSURE 80-89 MM HG: ICD-10-PCS | Mod: CPTII,S$GLB,, | Performed by: INTERNAL MEDICINE

## 2022-10-19 PROCEDURE — 1160F RVW MEDS BY RX/DR IN RCRD: CPT | Mod: CPTII,S$GLB,, | Performed by: INTERNAL MEDICINE

## 2022-10-19 PROCEDURE — 99999 PR PBB SHADOW E&M-EST. PATIENT-LVL III: ICD-10-PCS | Mod: PBBFAC,,, | Performed by: INTERNAL MEDICINE

## 2022-10-19 PROCEDURE — 1159F PR MEDICATION LIST DOCUMENTED IN MEDICAL RECORD: ICD-10-PCS | Mod: CPTII,S$GLB,, | Performed by: INTERNAL MEDICINE

## 2022-10-19 PROCEDURE — 86140 C-REACTIVE PROTEIN: CPT | Performed by: INTERNAL MEDICINE

## 2022-10-19 PROCEDURE — 1160F PR REVIEW ALL MEDS BY PRESCRIBER/CLIN PHARMACIST DOCUMENTED: ICD-10-PCS | Mod: CPTII,S$GLB,, | Performed by: INTERNAL MEDICINE

## 2022-10-19 ASSESSMENT — ROUTINE ASSESSMENT OF PATIENT INDEX DATA (RAPID3)
MDHAQ FUNCTION SCORE: 0.7
PSYCHOLOGICAL DISTRESS SCORE: 1.1
AM STIFFNESS SCORE: 0, NO
PAIN SCORE: 9
FATIGUE SCORE: 8.5
TOTAL RAPID3 SCORE: 6.44
PATIENT GLOBAL ASSESSMENT SCORE: 8

## 2022-10-21 NOTE — PROGRESS NOTES
History of present illness:  81-year-old gentleman I had seen previously for osteoarthritis.  He also has an intermittent inflammatory arthritis compatible with pseudogout.  I have not seen him for 2 years.  He was treated for colon cancer since his last visit.  He has recovered with no recurrence.    He comes in now because of pain in the lower back.  He has been seeing neuro surgery and pain management.  He received an injection in the back, which did not help.  Tylenol has not been helping.  He has been taking hydrocodone as needed.  The pain is worse during the day.  Heat has given him some relief.  He is not tried topical medications.      He has been having intermittent numbness in his hands.  He is had no recent swelling in the hands.  He has had no other areas of pain.    Physical examination:  Musculoskeletal:  He has decreased range of motion of the cervical spine.    He has decreased range of motion of the shoulders bilaterally, worse on the right than on the left.  Has pain on range of motion of the right shoulder.    Elbows and wrists are unremarkable.    He has bony hypertrophy of the PIP in D IP.  Has no synovitis.    He has decreased lateral bending of the  lumbar spine.  He has good flexion.  He has some pain on range of motion.    Hips are unremarkable.    He has bony hypertrophy of the left knee with decreased range of motion.  Has no swelling of the knee.  Right knee is unremarkable.    Ankles and small joints the feet are unremarkable.      Assessment:  He has multiple areas of osteoarthritis. He has no evidence of recurrence of his chondrocalcinosis.    Plans:   Laboratory studies obtained to rule out other forms of arthritis  2.  I recommend the use of Voltaren gel for his hands and knee  3.  He is not a candidate for NSAIDs  4.  He may use other topical medications on his back and shoulder  5.  Return in 6 months

## 2022-11-03 ENCOUNTER — IMMUNIZATION (OUTPATIENT)
Dept: INTERNAL MEDICINE | Facility: CLINIC | Age: 82
End: 2022-11-03
Payer: MEDICARE

## 2022-11-03 ENCOUNTER — HOSPITAL ENCOUNTER (OUTPATIENT)
Dept: RADIOLOGY | Facility: HOSPITAL | Age: 82
Discharge: HOME | End: 2022-11-03
Attending: ORTHOPAEDIC SURGERY
Payer: MEDICARE

## 2022-11-03 DIAGNOSIS — Z23 NEED FOR VACCINATION: Primary | ICD-10-CM

## 2022-11-03 DIAGNOSIS — M54.9 DORSALGIA, UNSPECIFIED: ICD-10-CM

## 2022-11-03 PROCEDURE — 0124A COVID-19, MRNA, LNP-S, BIVALENT BOOSTER, PF, 30 MCG/0.3 ML DOSE: CPT | Mod: CV19,PBBFAC | Performed by: INTERNAL MEDICINE

## 2022-11-03 PROCEDURE — 91312 COVID-19, MRNA, LNP-S, BIVALENT BOOSTER, PF, 30 MCG/0.3 ML DOSE: ICD-10-PCS | Mod: S$GLB,,, | Performed by: INTERNAL MEDICINE

## 2022-11-03 PROCEDURE — 91312 COVID-19, MRNA, LNP-S, BIVALENT BOOSTER, PF, 30 MCG/0.3 ML DOSE: CPT | Mod: S$GLB,,, | Performed by: INTERNAL MEDICINE

## 2022-11-04 ENCOUNTER — TELEPHONE (OUTPATIENT)
Dept: ORTHOPEDICS | Facility: CLINIC | Age: 82
End: 2022-11-04
Payer: MEDICARE

## 2022-11-04 NOTE — TELEPHONE ENCOUNTER
Rescheduled due to Book out.     Left message for patient informing of new date and time of appointment due to Book Out.

## 2022-11-08 ENCOUNTER — TELEPHONE (OUTPATIENT)
Dept: ORTHOPEDICS | Facility: CLINIC | Age: 82
End: 2022-11-08
Payer: MEDICARE

## 2022-11-08 NOTE — TELEPHONE ENCOUNTER
----- Message from Tasha Stein sent at 11/8/2022 10:12 AM CST -----  Contact: pt  Pt calling in regards to not getting his MRI due to his pacemaker , please call         Confirmed patient's contact info below:  Contact Name: Placido Carley  Phone Number: [phone]

## 2022-11-09 ENCOUNTER — TELEPHONE (OUTPATIENT)
Dept: ORTHOPEDICS | Facility: CLINIC | Age: 82
End: 2022-11-09
Payer: MEDICARE

## 2022-11-09 NOTE — TELEPHONE ENCOUNTER
----- Message from Dora Salcido sent at 11/9/2022  8:39 AM CST -----  Contact: Hans @ [phone]  Placido Howe calling regarding Patient Advice (message) for #pt is calling concerning MRI but pt has pace maker. Asking for an urgent call back

## 2022-11-09 NOTE — TELEPHONE ENCOUNTER
Left message for patient to return call or to send copy of pacemaker card to medical records to get put in the system. [phone] fax and [phone] phone.

## 2022-11-09 NOTE — TELEPHONE ENCOUNTER
Left message for patient to attach a picture of his pacemaker card front and back into [facility] to see if MRI compatible

## 2022-12-15 ENCOUNTER — TELEPHONE (OUTPATIENT)
Dept: HEMATOLOGY/ONCOLOGY | Facility: CLINIC | Age: 82
End: 2022-12-15
Payer: MEDICARE

## 2022-12-15 NOTE — TELEPHONE ENCOUNTER
Returned call. Spoke to pt's wife.   Reviewed upcoming schedule, labs and CT with all instructions including fasting x 4 hours, early arrival at 1000am all on 12/20 at [facility] followed by Dr. Venegas on Wed 12/21  Questions answered. Verbal understanding received.         ----- Message from Faith Gleason sent at 12/15/2022 10:33 AM CST -----  Contact: pt  Pt requesting a callback to get appt rescheduled           Confirmed contact below:  Contact Name:Placido Carley  Phone Number: [phone]

## 2022-12-20 ENCOUNTER — HOSPITAL ENCOUNTER (OUTPATIENT)
Dept: RADIOLOGY | Facility: HOSPITAL | Age: 82
Discharge: HOME | End: 2022-12-20
Attending: INTERNAL MEDICINE
Payer: MEDICARE

## 2022-12-20 DIAGNOSIS — C18.7 MALIGNANT NEOPLASM OF SIGMOID COLON: ICD-10-CM

## 2022-12-20 PROCEDURE — 71260 CT CHEST ABDOMEN PELVIS WITH CONTRAST (XPD): ICD-10-PCS | Mod: 26,,, | Performed by: RADIOLOGY

## 2022-12-20 PROCEDURE — 71260 CT THORAX DX C+: CPT | Mod: 26,,, | Performed by: RADIOLOGY

## 2022-12-20 PROCEDURE — 74177 CT ABD & PELVIS W/CONTRAST: CPT | Mod: TC

## 2022-12-20 PROCEDURE — 25500020 PHARM REV CODE 255: Performed by: INTERNAL MEDICINE

## 2022-12-20 PROCEDURE — 74177 CT CHEST ABDOMEN PELVIS WITH CONTRAST (XPD): ICD-10-PCS | Mod: 26,,, | Performed by: RADIOLOGY

## 2022-12-20 PROCEDURE — 74177 CT ABD & PELVIS W/CONTRAST: CPT | Mod: 26,,, | Performed by: RADIOLOGY

## 2022-12-20 PROCEDURE — 71260 CT THORAX DX C+: CPT | Mod: TC

## 2022-12-20 RX ADMIN — IOHEXOL 30 ML: 350 INJECTION, SOLUTION INTRAVENOUS at 12:12

## 2022-12-20 RX ADMIN — IOHEXOL 100 ML: 350 INJECTION, SOLUTION INTRAVENOUS at 12:12

## 2022-12-21 ENCOUNTER — TELEPHONE (OUTPATIENT)
Dept: HEMATOLOGY/ONCOLOGY | Facility: CLINIC | Age: 82
End: 2022-12-21
Payer: MEDICARE

## 2022-12-21 NOTE — TELEPHONE ENCOUNTER
"----- Message from Gage Curtis sent at 12/21/2022 11:16 AM CST -----  Cancel Existing Appointment        Appt Date: 12/21     Type of appt: 6 mo F/u    Physician: Theo    Reason for cancellation? Requesting to r/s for 1/5 or soonest available    Caller: Self    Contact Preference: [phone]             Additional Information:  Already sent r/s message to schedulers      "Thank you for all that you do for our patients"     "

## 2022-12-28 PROBLEM — Z45.010 ENCOUNTER FOR PACEMAKER AT END OF BATTERY LIFE: Status: ACTIVE | Noted: 2022-12-28

## 2022-12-28 PROBLEM — I48.0 PAF (PAROXYSMAL ATRIAL FIBRILLATION): Status: ACTIVE | Noted: 2022-12-28

## 2022-12-28 PROBLEM — I51.89 LEFT VENTRICULAR SYSTOLIC DYSFUNCTION, NYHA CLASS 2: Status: ACTIVE | Noted: 2022-12-28

## 2023-01-04 ENCOUNTER — OFFICE VISIT (OUTPATIENT)
Dept: HEMATOLOGY/ONCOLOGY | Facility: CLINIC | Age: 83
End: 2023-01-04
Payer: MEDICARE

## 2023-01-04 VITALS
HEART RATE: 80 BPM | BODY MASS INDEX: 29.95 KG/M2 | HEIGHT: 70 IN | RESPIRATION RATE: 17 BRPM | DIASTOLIC BLOOD PRESSURE: 76 MMHG | TEMPERATURE: 97 F | OXYGEN SATURATION: 97 % | WEIGHT: 209.19 LBS | SYSTOLIC BLOOD PRESSURE: 124 MMHG

## 2023-01-04 DIAGNOSIS — F32.A DEPRESSION, UNSPECIFIED DEPRESSION TYPE: ICD-10-CM

## 2023-01-04 DIAGNOSIS — R19.7 DIARRHEA, UNSPECIFIED TYPE: ICD-10-CM

## 2023-01-04 DIAGNOSIS — C18.7 MALIGNANT NEOPLASM OF SIGMOID COLON: Primary | ICD-10-CM

## 2023-01-04 PROCEDURE — 3074F SYST BP LT 130 MM HG: CPT | Mod: CPTII,S$GLB,, | Performed by: INTERNAL MEDICINE

## 2023-01-04 PROCEDURE — 99999 PR PBB SHADOW E&M-EST. PATIENT-LVL III: ICD-10-PCS | Mod: PBBFAC,,, | Performed by: INTERNAL MEDICINE

## 2023-01-04 PROCEDURE — 3074F PR MOST RECENT SYSTOLIC BLOOD PRESSURE < 130 MM HG: ICD-10-PCS | Mod: CPTII,S$GLB,, | Performed by: INTERNAL MEDICINE

## 2023-01-04 PROCEDURE — 99214 OFFICE O/P EST MOD 30 MIN: CPT | Mod: S$GLB,,, | Performed by: INTERNAL MEDICINE

## 2023-01-04 PROCEDURE — 3078F PR MOST RECENT DIASTOLIC BLOOD PRESSURE < 80 MM HG: ICD-10-PCS | Mod: CPTII,S$GLB,, | Performed by: INTERNAL MEDICINE

## 2023-01-04 PROCEDURE — 3078F DIAST BP <80 MM HG: CPT | Mod: CPTII,S$GLB,, | Performed by: INTERNAL MEDICINE

## 2023-01-04 PROCEDURE — 1126F PR PAIN SEVERITY QUANTIFIED, NO PAIN PRESENT: ICD-10-PCS | Mod: CPTII,S$GLB,, | Performed by: INTERNAL MEDICINE

## 2023-01-04 PROCEDURE — 1126F AMNT PAIN NOTED NONE PRSNT: CPT | Mod: CPTII,S$GLB,, | Performed by: INTERNAL MEDICINE

## 2023-01-04 PROCEDURE — 99999 PR PBB SHADOW E&M-EST. PATIENT-LVL III: CPT | Mod: PBBFAC,,, | Performed by: INTERNAL MEDICINE

## 2023-01-04 PROCEDURE — 99214 PR OFFICE/OUTPT VISIT, EST, LEVL IV, 30-39 MIN: ICD-10-PCS | Mod: S$GLB,,, | Performed by: INTERNAL MEDICINE

## 2023-01-04 NOTE — PROGRESS NOTES
PROGRESS NOTE    Subjective:       Patient ID: Placido Howe is a 82 y.o. male.  MRN: [id number]  : 1940    Chief Complaint: Colon Cancer (Depression,fatigue) and Pain (back)  Sigmoid colon adenocarcinoma, stage III    History of Present Illness:   Placido Howe is a 82 y.o. male who presents with sigmoid colon adenocarcinoma.  He is accompanied by his wife today.       As previously documented, the patient was found to have iron deficiency anemia as well as intermittent diarrhea earlier in . He underwent colonoscopy on 03/10/2021 for further evaluation and a mass was found in distal descending colon.  He also reported a weight loss of about 25 lb over the past 1 year.      He has other medical issues including a history of DVT, PE, status post IVC filter.  He also has a permanent pacemaker, hypertension, hyperlipidemia, diabetes mellitus.     Staging workup was negative.  He saw Dr. Mayo and underwent a colon resection and lymph node evaluation on 2021.  Tumor was 2.2 cm, one of 23 lymph nodes were positive for adenocarcinoma, he was MSI stable.     His postop course was complicated by recurrent colitis.  He was admitted to [facility] from 4/10/21-21 for colitis.  C diff was negative at that time.  He was treated with ciprofloxacin and metronidazole.   He was admitted to the hospital again  from 21-21 when he was found to have C diff colitis.  He was initially treated with metronidazole and vancomycin and discharged home to complete 2.5 weeks of vancomycin.     Interim history:  He presents today to discuss his lab, scans, symptoms and further recommendations.  He had colonoscopy on 22, showed diverticulosis, no polyps. Repeat in 3 years. EGD showed gastritis, Protonix started. Biopsy negative for malignancy.     Has not been doing PT regularly. He reports loss of appetite and has lost some weight. Depression  is not well controlled on Zoloft.     Underwent PPM generator change and upgrade to CRT P device on 12/28/22 for complete heart block and EF 40%      Oncology History:  Oncology History   Colon cancer   3/31/2021 Initial Diagnosis    Colon cancer     5/19/2021 Cancer Staged    Staging form: Colon and Rectum, AJCC 8th Edition  - Clinical: Stage IIIB (cT3, cN1, cM0)         3/31/21  Path:  1. SIGMOID COLON, RESECTION:   Adenocarcinoma, moderately differentiated, 2.2cm   Tumor invades subserosal adipose tissue   Margins are all negative   One of twenty three lymph nodes positive for metastatic adenocarcinoma (1/23  Pathologic Staging - p T3 N1a Mx   History:  Past Medical History:   Diagnosis Date    Alopecia     Anemia     Anxiety     Arthritis     Back pain     Colon cancer     sigmoid adenocarcinoma s/p sigmoid colectomy 3/31/21    Deep vein thrombosis     Depression     Diabetes mellitus     History of kidney stones     Hx of psychiatric care     Hyperlipidemia     Hypertension 04/03/2014    Impaired glucose tolerance 04/03/2014    Numbness     left hand    Obesity (BMI 30-39.9) 04/03/2014    Pulmonary embolism     S/P parathyroidectomy 08/29/2014    Therapy     Type 2 diabetes mellitus, uncontrolled 04/07/2014    Vitamin B12 deficiency 07/10/2014    Vitamin D deficiency 07/10/2014      Past Surgical History:   Procedure Laterality Date    ABDOMINAL SURGERY      CARDIAC PACEMAKER PLACEMENT      CARPAL TUNNEL RELEASE  2013    right hand    carpel tunnel      right hand    CATARACT EXTRACTION  2001    left and right eyes    CHOLECYSTECTOMY      COLONOSCOPY N/A 03/10/2021    Procedure: COLONOSCOPY;  Surgeon: Iván Colunga MD;  Location: [address]);  Service: Endoscopy;  Laterality: N/A;  ok to hold xarelto x3 days per Dr. Brewer, see media 12/23-KPvt  medtronic pacemaker  C-Diff negative - see micro - ERW  COVID + on 1/22/21 , per endo protocol, no further testing needed - ERW    COLONOSCOPY N/A 05/26/2022     Procedure: COLONOSCOPY;  Surgeon: Iván Colunga MD;  Location: [facility] (4TH FLR);  Service: Endoscopy;  Laterality: N/A;    ENDOSCOPIC ULTRASOUND OF UPPER GASTROINTESTINAL TRACT N/A 02/24/2021    Procedure: ULTRASOUND, UPPER GI TRACT, ENDOSCOPIC;  Surgeon: Alessio Wilde MD;  Location: [facility];  Service: Endoscopy;  Laterality: N/A;  Upper endoscopy the pancreas for evaluation of weight loss diarrhea for a year early satiety anorexia and family history of her first-degree relative with pancreatic cancer his father at age 63    ESOPHAGOGASTRODUODENOSCOPY  02/24/2021    ESOPHAGOGASTRODUODENOSCOPY N/A 02/24/2021    Procedure: EGD (ESOPHAGOGASTRODUODENOSCOPY);  Surgeon: Alessio Wilde MD;  Location: [facility];  Service: Endoscopy;  Laterality: N/A;  Please rule out celiac sprue/NO covid needed MP    ESOPHAGOGASTRODUODENOSCOPY N/A 03/10/2021    Procedure: EGD (ESOPHAGOGASTRODUODENOSCOPY);  Surgeon: Iván Colunga MD;  Location: [facility] (4TH FLR);  Service: Endoscopy;  Laterality: N/A;  Will need to test for gastric pH day of case.  Patient needs to be Off all Proton Pump Inhibitors and H2 blocker medications for 2 (Two) weeks prior to EGD.  Nexium (esomeprazole)  Prilosec (omeprazole)   Protonix (pantoprazole)  Prevacid (lansoprazole    ESOPHAGOGASTRODUODENOSCOPY N/A 05/26/2022    Procedure: EGD (ESOPHAGOGASTRODUODENOSCOPY);  Surgeon: Iván Colunga MD;  Location: [facility] (4TH FLR);  Service: Endoscopy;  Laterality: N/A;  Fully Vaccinated, Cardiac clearance and Hold Xarelto x 2 days per Dr. Garcia see media 4/7/22, Medtronic Pacemaker, prep instr mailed and portal -ml    FLEXIBLE SIGMOIDOSCOPY N/A 03/31/2021    Procedure: SIGMOIDOSCOPY, FLEXIBLE;  Surgeon: JOHNNY Mayo MD;  Location: [facility] OR 2ND FLR;  Service: Colon and Rectal;  Laterality: N/A;    IMPLANTATION OF BIVENTRICULAR HEART PACEMAKER N/A 12/28/2022    Procedure: INSERTION, PACEMAKER, BIVENTRICULAR;  Surgeon: Melvin Garcia MD;   Location: [facility] CATH;  Service: Cardiology;  Laterality: N/A;  PPM generator replacement with upgrade to CRT pacemaker    IVC FILTER RETRIEVAL      JOINT REPLACEMENT      [facility]    KNEE ARTHROSCOPY W/ MENISCAL REPAIR      right    LAPAROSCOPIC SIGMOIDECTOMY N/A 03/31/2021    Procedure: COLECTOMY, SIGMOID, LAPAROSCOPIC, ERAS high;  Surgeon: JOHNNY Mayo MD;  Location: [facility] OR 2ND FLR;  Service: Colon and Rectal;  Laterality: N/A;    MOBILIZATION OF SPLENIC FLEXURE N/A 03/31/2021    Procedure: MOBILIZATION, SPLENIC FLEXURE;  Surgeon: JOHNNY Mayo MD;  Location: [facility] OR 2ND FLR;  Service: Colon and Rectal;  Laterality: N/A;    REPAIR OF CAROTID ARTERY Right     THYROID SURGERY  08/2014    patient denies, thinks this was for his wife    TONSILLECTOMY      when pt at 3 y/o    Upper EUS  02/24/2021     Family History   Problem Relation Age of Onset    Rheum arthritis Mother     Diabetes Mellitus Mother     Alzheimer's disease Mother     Cancer Father     Liver cancer Father 63        Possibly pancreatic cancer mets to the liver    Pancreatic cancer Father 63    Cancer Brother 8        ALL    Leukemia Brother     Breast cancer Sister     Celiac disease Neg Hx     Cirrhosis Neg Hx     Colon cancer Neg Hx     Colon polyps Neg Hx     Esophageal cancer Neg Hx     Stomach cancer Neg Hx     Ulcerative colitis Neg Hx      Social History     Tobacco Use    Smoking status: Never    Smokeless tobacco: Never    Tobacco comments:     5 cigars a year   Substance and Sexual Activity    Alcohol use: Yes     Comment: SOCIALLY    Drug use: No    Sexual activity: Yes     Partners: Female        ROS:   Review of Systems   Constitutional:  Positive for malaise/fatigue and weight loss. Negative for fever.   HENT:  Negative for congestion, ear pain, nosebleeds and sore throat.    Eyes:  Negative for blurred vision, double vision and photophobia.   Respiratory:  Negative for cough, hemoptysis, sputum production, shortness of  "breath, wheezing and stridor.    Cardiovascular:  Negative for chest pain, palpitations, orthopnea and leg swelling.   Gastrointestinal:  Positive for diarrhea. Negative for abdominal pain, blood in stool, constipation, heartburn, melena, nausea and vomiting.        Loss of appetite    Genitourinary:  Negative for dysuria and hematuria.   Musculoskeletal:  Positive for back pain. Negative for myalgias and neck pain.   Skin:  Negative for itching and rash.   Neurological:  Negative for dizziness, focal weakness, seizures, weakness and headaches.   Endo/Heme/Allergies:  Negative for polydipsia. Does not bruise/bleed easily.   Psychiatric/Behavioral:  Positive for depression. Negative for memory loss. The patient is nervous/anxious. The patient does not have insomnia.       Objective:     Vitals:    01/04/23 1430   BP: 124/76   Pulse: 80   Resp: 17   Temp: 97.1 °F (36.2 °C)   TempSrc: Oral   SpO2: 97%   Weight: 94.9 kg (209 lb 3.5 oz)   Height: 5' 10" (1.778 m)   PainSc: 0-No pain     Wt Readings from Last 10 Encounters:   01/04/23 94.9 kg (209 lb 3.5 oz)   12/29/22 96.6 kg (213 lb)   12/22/22 96.6 kg (213 lb)   10/19/22 101.1 kg (222 lb 14.2 oz)   10/13/22 98.7 kg (217 lb 9.5 oz)   09/26/22 101.6 kg (224 lb)   06/15/22 99.4 kg (219 lb 2.2 oz)   05/26/22 95.3 kg (210 lb)   03/15/22 100.7 kg (222 lb 0.1 oz)   12/01/21 97.3 kg (214 lb 6.4 oz)       Physical Examination:   Physical Exam  Vitals and nursing note reviewed.   Constitutional:       General: He is not in acute distress.     Appearance: He is not diaphoretic.   HENT:      Head: Normocephalic.      Mouth/Throat:      Pharynx: No oropharyngeal exudate.   Eyes:      General: No scleral icterus.     Conjunctiva/sclera: Conjunctivae normal.   Neck:      Thyroid: No thyromegaly.   Cardiovascular:      Rate and Rhythm: Normal rate and regular rhythm.      Heart sounds: Normal heart sounds. No murmur heard.  Pulmonary:      Effort: Pulmonary effort is normal. No " respiratory distress.      Breath sounds: No stridor. No wheezing or rales.   Chest:      Chest wall: No tenderness.   Abdominal:      General: Bowel sounds are normal. There is no distension.      Palpations: Abdomen is soft. There is no mass.      Tenderness: There is no abdominal tenderness. There is no rebound.   Musculoskeletal:         General: No tenderness or deformity. Normal range of motion.      Cervical back: Neck supple.   Lymphadenopathy:      Cervical: No cervical adenopathy.   Skin:     General: Skin is warm and dry.      Findings: No erythema or rash.   Neurological:      Mental Status: He is alert and oriented to person, place, and time.      Cranial Nerves: No cranial nerve deficit.      Coordination: Coordination normal.      Gait: Gait is intact.      Comments: Ambulates with a cane    Psychiatric:         Mood and Affect: Affect normal.         Cognition and Memory: Memory normal.         Judgment: Judgment normal.        Diagnostic Tests:  Significant Imaging: I have reviewed and interpreted all pertinent imaging results/findings.      Laboratory Data:  All pertinent labs have been reviewed.  Labs:   Lab Results   Component Value Date    WBC 5.10 12/29/2022    RBC 3.68 (L) 12/29/2022    HGB 11.5 (L) 12/29/2022    HCT 34.2 (L) 12/29/2022    MCV 93 12/29/2022     (L) 12/29/2022     (H) 12/29/2022     12/29/2022    K 4.0 12/29/2022    BUN 12 12/29/2022    CREATININE 0.80 12/29/2022    AST 24 12/20/2022    ALT 18 12/20/2022    BILITOT 1.0 12/20/2022       Assessment/Plan:   Malignant neoplasm of sigmoid colon  pT3N1a, Stage III   He is status post resection.  Adjuvant chemotherapy for stage III disease was discussed at length with the patient and his family during the initial visit.  See  consult for details.  He declined adjuvant chemotherapy.    Labs, scan results were discussed.  There is no evidence for recurrent or metastatic disease.  Labs are stable.  CEA is normal.        We will continue active surveillance.   We will continue to see him every 3-6 months, with CT scans done every 6 months for 3-5  Years.  CTs will be done prior to his next visit. Colonoscopy is due in 2025.       Pernicious anemia  Continue B12 injections monthly.     Diarrhea, unspecified type  Continue Imodium as needed.  Continue GI follow-up.    Moderate episode of recurrent major depressive disorder  Continue Zoloft.  Referred to Psychiatry per patient's family request.      ECOG SCORE    1 - Restricted in strenuous activity-ambulatory and able to carry out work of a light nature           Discussion:   No follow-ups on file.    Plan was discussed with the patient at length, and he verbalized understanding. Placido was given an opportunity to ask questions that were answered to his satisfaction, and he was advised to call in the interval if any problems or questions arise.    Electronically signed by Tasha Venegas MD        Route Chart for Scheduling    Med Onc Chart Routing      Follow up with physician 6 months.   Follow up with [facility]    Infusion scheduling note    Injection scheduling note    Labs CMP, CEA and CBC   Lab interval:  [place], just before visit   Imaging CT chest abdomen pelvis   [place], just before md visit   Pharmacy appointment    Other referrals

## 2023-03-08 RX ORDER — PANTOPRAZOLE SODIUM 40 MG/1
40 TABLET, DELAYED RELEASE ORAL DAILY
Qty: 30 TABLET | Refills: 11 | Status: CANCELLED | OUTPATIENT
Start: 2023-03-08 | End: 2024-03-07

## 2023-03-09 ENCOUNTER — TELEPHONE (OUTPATIENT)
Dept: GASTROENTEROLOGY | Facility: CLINIC | Age: 83
End: 2023-03-09
Payer: MEDICARE

## 2023-06-26 ENCOUNTER — HOSPITAL ENCOUNTER (OUTPATIENT)
Dept: RADIOLOGY | Facility: HOSPITAL | Age: 83
Discharge: HOME | End: 2023-06-26
Attending: INTERNAL MEDICINE
Payer: MEDICARE

## 2023-06-26 DIAGNOSIS — C18.7 MALIGNANT NEOPLASM OF SIGMOID COLON: ICD-10-CM

## 2023-06-26 PROCEDURE — 71260 CT THORAX DX C+: CPT | Mod: 26,,, | Performed by: RADIOLOGY

## 2023-06-26 PROCEDURE — 74177 CT CHEST ABDOMEN PELVIS WITH CONTRAST (XPD): ICD-10-PCS | Mod: 26,,, | Performed by: RADIOLOGY

## 2023-06-26 PROCEDURE — 74177 CT ABD & PELVIS W/CONTRAST: CPT | Mod: TC

## 2023-06-26 PROCEDURE — 71260 CT THORAX DX C+: CPT | Mod: TC

## 2023-06-26 PROCEDURE — 74177 CT ABD & PELVIS W/CONTRAST: CPT | Mod: 26,,, | Performed by: RADIOLOGY

## 2023-06-26 PROCEDURE — 71260 CT CHEST ABDOMEN PELVIS WITH CONTRAST (XPD): ICD-10-PCS | Mod: 26,,, | Performed by: RADIOLOGY

## 2023-06-26 PROCEDURE — 25500020 PHARM REV CODE 255: Performed by: INTERNAL MEDICINE

## 2023-06-26 RX ADMIN — IOHEXOL 100 ML: 350 INJECTION, SOLUTION INTRAVENOUS at 10:06

## 2023-06-26 RX ADMIN — IOHEXOL 30 ML: 350 INJECTION, SOLUTION INTRAVENOUS at 10:06

## 2023-07-06 ENCOUNTER — TELEPHONE (OUTPATIENT)
Dept: HEMATOLOGY/ONCOLOGY | Facility: CLINIC | Age: 83
End: 2023-07-06
Payer: MEDICARE

## 2023-07-06 NOTE — TELEPHONE ENCOUNTER
"----- Message from Marilu Griffin sent at 7/6/2023  2:05 PM CDT -----  Regarding: Pt advice  Contact: Pt     Pt requesting call back in regards to fall pt recently had which cause him to break some bones in back.   Please call and adv @       Confirmed contact below:   Contact Name: Placido Howe  Phone Number: [phone]               Additional Notes:  "Thank you for all that you do for our patients"                                           "

## 2023-07-07 ENCOUNTER — TELEPHONE (OUTPATIENT)
Dept: HEMATOLOGY/ONCOLOGY | Facility: CLINIC | Age: 83
End: 2023-07-07
Payer: MEDICARE

## 2023-07-07 NOTE — TELEPHONE ENCOUNTER
"----- Message from Rita Serna NP sent at 7/6/2023  9:59 PM CDT -----  Thanks, please try calling again when you have a minute and see what is specific questions/concerns are?  It looks like he went to the ER 7/4 and he has an appt next week with Dr. Venegas.    Thanks,  Rita    ----- Message -----  From: Blair Severino RN  Sent: 7/6/2023   2:32 PM CDT  To: Rita Serna NP    [facility] regarding message below. I called but he didn't answer.   ----- Message -----  From: Marilu Griffin  Sent: 7/6/2023   2:06 PM CDT  To: Theo Cordova ([facility]) Staff    Regarding: Pt advice  Contact: Pt     Pt requesting call back in regards to fall pt recently had which cause him to break some bones in back.   Please call and adv @       Confirmed contact below:   Contact Name: Placido Howe  Phone Number: [phone]               Additional Notes:  "Thank you for all that you do for our patients"                                               "

## 2023-08-09 ENCOUNTER — TELEPHONE (OUTPATIENT)
Dept: SURGERY | Facility: CLINIC | Age: 83
End: 2023-08-09
Payer: MEDICARE

## 2023-08-09 ENCOUNTER — TELEPHONE (OUTPATIENT)
Dept: HEMATOLOGY/ONCOLOGY | Facility: CLINIC | Age: 83
End: 2023-08-09
Payer: MEDICARE

## 2023-08-09 NOTE — TELEPHONE ENCOUNTER
----- Message from Cora Alberto sent at 8/9/2023 11:23 AM CDT -----  Regarding: pt advice  Contact: [phone]  Pt wife Ceci jacobson miss call from nurse Osorio in regards to scheduling. Pls call

## 2023-08-09 NOTE — TELEPHONE ENCOUNTER
----- Message from Marta Edouard sent at 8/9/2023 10:36 AM CDT -----  Regarding: Appt  Contact: Ceci [phone]            Appt Type:   Ep     Date/Time Preference:  Next florentino     Treating Provider:  Tasha Venegas MD    Caller Name:  Ceci     Contact Prefer:  [phone]    Comments/Notes: patient wife called to reschedule missed appt from 07/11/23

## 2023-09-27 ENCOUNTER — OFFICE VISIT (OUTPATIENT)
Dept: HEMATOLOGY/ONCOLOGY | Facility: CLINIC | Age: 83
End: 2023-09-27
Payer: MEDICARE

## 2023-09-27 VITALS
DIASTOLIC BLOOD PRESSURE: 84 MMHG | OXYGEN SATURATION: 97 % | BODY MASS INDEX: 28.53 KG/M2 | HEART RATE: 80 BPM | HEIGHT: 70 IN | TEMPERATURE: 98 F | RESPIRATION RATE: 18 BRPM | WEIGHT: 199.31 LBS | SYSTOLIC BLOOD PRESSURE: 139 MMHG

## 2023-09-27 DIAGNOSIS — I48.0 PAF (PAROXYSMAL ATRIAL FIBRILLATION): ICD-10-CM

## 2023-09-27 DIAGNOSIS — E11.9 TYPE 2 DIABETES MELLITUS WITHOUT COMPLICATION, WITHOUT LONG-TERM CURRENT USE OF INSULIN: ICD-10-CM

## 2023-09-27 DIAGNOSIS — F33.1 MODERATE EPISODE OF RECURRENT MAJOR DEPRESSIVE DISORDER: ICD-10-CM

## 2023-09-27 DIAGNOSIS — C18.9 MALIGNANT NEOPLASM OF COLON, UNSPECIFIED PART OF COLON: Primary | Chronic | ICD-10-CM

## 2023-09-27 DIAGNOSIS — D51.0 PERNICIOUS ANEMIA: ICD-10-CM

## 2023-09-27 PROCEDURE — 1100F PR PT FALLS ASSESS DOC 2+ FALLS/FALL W/INJURY/YR: ICD-10-PCS | Mod: CPTII,S$GLB,, | Performed by: INTERNAL MEDICINE

## 2023-09-27 PROCEDURE — 3075F PR MOST RECENT SYSTOLIC BLOOD PRESS GE 130-139MM HG: ICD-10-PCS | Mod: CPTII,S$GLB,, | Performed by: INTERNAL MEDICINE

## 2023-09-27 PROCEDURE — 99214 OFFICE O/P EST MOD 30 MIN: CPT | Mod: S$GLB,,, | Performed by: INTERNAL MEDICINE

## 2023-09-27 PROCEDURE — 1159F PR MEDICATION LIST DOCUMENTED IN MEDICAL RECORD: ICD-10-PCS | Mod: CPTII,S$GLB,, | Performed by: INTERNAL MEDICINE

## 2023-09-27 PROCEDURE — 1159F MED LIST DOCD IN RCRD: CPT | Mod: CPTII,S$GLB,, | Performed by: INTERNAL MEDICINE

## 2023-09-27 PROCEDURE — 3288F PR FALLS RISK ASSESSMENT DOCUMENTED: ICD-10-PCS | Mod: CPTII,S$GLB,, | Performed by: INTERNAL MEDICINE

## 2023-09-27 PROCEDURE — 1100F PTFALLS ASSESS-DOCD GE2>/YR: CPT | Mod: CPTII,S$GLB,, | Performed by: INTERNAL MEDICINE

## 2023-09-27 PROCEDURE — 99999 PR PBB SHADOW E&M-EST. PATIENT-LVL IV: ICD-10-PCS | Mod: PBBFAC,,, | Performed by: INTERNAL MEDICINE

## 2023-09-27 PROCEDURE — 1126F AMNT PAIN NOTED NONE PRSNT: CPT | Mod: CPTII,S$GLB,, | Performed by: INTERNAL MEDICINE

## 2023-09-27 PROCEDURE — 3079F DIAST BP 80-89 MM HG: CPT | Mod: CPTII,S$GLB,, | Performed by: INTERNAL MEDICINE

## 2023-09-27 PROCEDURE — 3288F FALL RISK ASSESSMENT DOCD: CPT | Mod: CPTII,S$GLB,, | Performed by: INTERNAL MEDICINE

## 2023-09-27 PROCEDURE — 99999 PR PBB SHADOW E&M-EST. PATIENT-LVL IV: CPT | Mod: PBBFAC,,, | Performed by: INTERNAL MEDICINE

## 2023-09-27 PROCEDURE — 3075F SYST BP GE 130 - 139MM HG: CPT | Mod: CPTII,S$GLB,, | Performed by: INTERNAL MEDICINE

## 2023-09-27 PROCEDURE — 1126F PR PAIN SEVERITY QUANTIFIED, NO PAIN PRESENT: ICD-10-PCS | Mod: CPTII,S$GLB,, | Performed by: INTERNAL MEDICINE

## 2023-09-27 PROCEDURE — 3079F PR MOST RECENT DIASTOLIC BLOOD PRESSURE 80-89 MM HG: ICD-10-PCS | Mod: CPTII,S$GLB,, | Performed by: INTERNAL MEDICINE

## 2023-09-27 PROCEDURE — 1160F RVW MEDS BY RX/DR IN RCRD: CPT | Mod: CPTII,S$GLB,, | Performed by: INTERNAL MEDICINE

## 2023-09-27 PROCEDURE — 99214 PR OFFICE/OUTPT VISIT, EST, LEVL IV, 30-39 MIN: ICD-10-PCS | Mod: S$GLB,,, | Performed by: INTERNAL MEDICINE

## 2023-09-27 PROCEDURE — 1160F PR REVIEW ALL MEDS BY PRESCRIBER/CLIN PHARMACIST DOCUMENTED: ICD-10-PCS | Mod: CPTII,S$GLB,, | Performed by: INTERNAL MEDICINE

## 2023-09-27 NOTE — PROGRESS NOTES
PROGRESS NOTE    Subjective:       Patient ID: Placido Howe is a 82 y.o. male.  MRN: [id number]  : 1940    Chief Complaint: Colon Cancer (Depression,fatigue) and Pain (back)  Sigmoid colon adenocarcinoma, stage III    History of Present Illness:   Placido Howe is a 82 y.o. male who presents with sigmoid colon adenocarcinoma.  He is accompanied by his wife today.       As previously documented, the patient was found to have iron deficiency anemia as well as intermittent diarrhea earlier in . He underwent colonoscopy on 03/10/2021 for further evaluation and a mass was found in distal descending colon.  He also reported a weight loss of about 25 lb over the past 1 year.      He has other medical issues including a history of DVT, PE, status post IVC filter.  He also has a permanent pacemaker, hypertension, hyperlipidemia, diabetes mellitus.     Staging workup was negative.  He saw Dr. Mayo and underwent a colon resection and lymph node evaluation on 2021.  Tumor was 2.2 cm, one of 23 lymph nodes were positive for adenocarcinoma, he was MSI stable.     His postop course was complicated by recurrent colitis.  [place] [facility] [place] colitis.  C diff was negative at that time.  He was treated with ciprofloxacin and metronidazole.   He was admitted to the hospital again  from 21-21 when he was found to have C diff colitis.  He was initially treated with metronidazole and vancomycin and discharged home to complete 2.5 weeks of vancomycin.    He had colonoscopy on 22, showed diverticulosis, no polyps. Repeat in 3 years. EGD showed gastritis, Protonix started. Biopsy negative for malignancy.        Interim history:  He presents today to discuss his lab, scans, symptoms and further recommendations.    Underwent PPM generator change and upgrade to CRT P device on 22 for complete heart  block and EF 40%    Had a fall and a hip fracture, s/p surgery in July, has completed Rehab.   Feels his mobility is back to his baseline.   Reports continued weight loss and loss of appetite.   Stable depression.       Oncology History:  Oncology History   Colon cancer   3/31/2021 Initial Diagnosis    Colon cancer     5/19/2021 Cancer Staged    Staging form: Colon and Rectum, AJCC 8th Edition  - Clinical: Stage IIIB (cT3, cN1, cM0)       3/31/21  Path:  1. SIGMOID COLON, RESECTION:   Adenocarcinoma, moderately differentiated, 2.2cm   Tumor invades subserosal adipose tissue   Margins are all negative   One of twenty three lymph nodes positive for metastatic adenocarcinoma (1/23  Pathologic Staging - p T3 N1a Mx     6/26/23  CT chest abd pelvis   Impression:     Postoperative changes of prior sigmoid colon resection.  No evidence for residual or recurrent disease.  No evidence for metastatic disease.     Tiny pulmonary micro nodules, stable from prior examinations.  No consolidation or pleural effusions.  No new nodules.     Fibrotic changes in the lung bases.     Bilateral renal cysts, some of which are hyperdense, unchanged from prior examinations.     History:  Past Medical History:   Diagnosis Date    Alopecia     Anemia     Anxiety     Arthritis     Back pain     Colon cancer     sigmoid adenocarcinoma s/p sigmoid colectomy 3/31/21    Deep vein thrombosis     Depression     Diabetes mellitus     History of kidney stones     Hx of psychiatric care     Hyperlipidemia     Hypertension 04/03/2014    Impaired glucose tolerance 04/03/2014    Numbness     left hand    Obesity (BMI 30-39.9) 04/03/2014    Pulmonary embolism     S/P parathyroidectomy 08/29/2014    Therapy     Type 2 diabetes mellitus, uncontrolled 04/07/2014    Vitamin B12 deficiency 07/10/2014    Vitamin D deficiency 07/10/2014      Past Surgical History:   Procedure Laterality Date    ABDOMINAL SURGERY      CARDIAC PACEMAKER PLACEMENT      CARPAL TUNNEL  RELEASE  2013    right hand    carpel tunnel      right hand    CATARACT EXTRACTION  2001    left and right eyes    CHOLECYSTECTOMY      COLONOSCOPY N/A 03/10/2021    Procedure: COLONOSCOPY;  Surgeon: Iván Colunga MD;  Location: [facility] (4TH FLR);  Service: Endoscopy;  Laterality: N/A;  ok to hold xarelto x3 days per Dr. Brewer, see media 12/23-KPvt  medtronic pacemaker  C-Diff negative - see micro - ERW  COVID + on 1/22/21 , per endo protocol, no further testing needed - ERW    COLONOSCOPY N/A 05/26/2022    Procedure: COLONOSCOPY;  Surgeon: Iván Colunga MD;  Location: [facility] (4TH FLR);  Service: Endoscopy;  Laterality: N/A;    ENDOSCOPIC ULTRASOUND OF UPPER GASTROINTESTINAL TRACT N/A 02/24/2021    Procedure: ULTRASOUND, UPPER GI TRACT, ENDOSCOPIC;  Surgeon: Alessio Wilde MD;  Location: [facility];  Service: Endoscopy;  Laterality: N/A;  Upper endoscopy the pancreas for evaluation of weight loss diarrhea for a year early satiety anorexia and family history of her first-degree relative with pancreatic cancer his father at age 63    ESOPHAGOGASTRODUODENOSCOPY  02/24/2021    ESOPHAGOGASTRODUODENOSCOPY N/A 02/24/2021    Procedure: EGD (ESOPHAGOGASTRODUODENOSCOPY);  Surgeon: Alessio Wilde MD;  Location: [facility];  Service: Endoscopy;  Laterality: N/A;  Please rule out celiac sprue/NO covid needed MP    ESOPHAGOGASTRODUODENOSCOPY N/A 03/10/2021    Procedure: EGD (ESOPHAGOGASTRODUODENOSCOPY);  Surgeon: Iván Colunga MD;  Location: [facility] (4TH FLR);  Service: Endoscopy;  Laterality: N/A;  Will need to test for gastric pH day of case.  Patient needs to be Off all Proton Pump Inhibitors and H2 blocker medications for 2 (Two) weeks prior to EGD.  Nexium (esomeprazole)  Prilosec (omeprazole)   Protonix (pantoprazole)  Prevacid (lansoprazole    ESOPHAGOGASTRODUODENOSCOPY N/A 05/26/2022    Procedure: EGD (ESOPHAGOGASTRODUODENOSCOPY);  Surgeon: Iván Colunga MD;  Location: [facility] (4TH FLR);  Service:  Endoscopy;  Laterality: N/A;  Fully Vaccinated, Cardiac clearance and Hold Xarelto x 2 days per Dr. Garcia see media 4/7/22, Medtronic Pacemaker, prep instr mailed and portal -ml    FLEXIBLE SIGMOIDOSCOPY N/A 03/31/2021    Procedure: SIGMOIDOSCOPY, FLEXIBLE;  Surgeon: JOHNNY Mayo MD;  Location: [facility] 2ND FLR;  Service: Colon and Rectal;  Laterality: N/A;    IMPLANTATION OF BIVENTRICULAR HEART PACEMAKER N/A 12/28/2022    Procedure: INSERTION, PACEMAKER, BIVENTRICULAR;  Surgeon: Melvin Garcia MD;  [facility]: [facility] C[facility];  Service: Cardiology;  Laterality: N/A;  PPM generator replacement with upgrade to CRT pacemaker    IVC FILTER RETRIEVAL      JOINT REPLACEMENT      [facility]    KNEE ARTHROSCOPY W/ MENISCAL REPAIR      right    LAPAROSCOPIC SIGMOIDECTOMY N/A 03/31/2021    Procedure: COLECTOMY, SIGMOID, LAPAROSCOPIC, ERAS high;  Surgeon: JOHNNY Mayo MD;  Location: [facility] OR 2ND FLR;  Service: Colon and Rectal;  Laterality: N/A;    MOBILIZATION OF SPLENIC FLEXURE N/A 03/31/2021    Procedure: MOBILIZATION, SPLENIC FLEXURE;  Surgeon: JOHNNY Mayo MD;  Location: [facility] OR 2ND FLR;  Service: Colon and Rectal;  Laterality: N/A;    REPAIR OF CAROTID ARTERY Right     THYROID SURGERY  08/2014    patient denies, thinks this was for his wife    TONSILLECTOMY      when pt at 5 y/o    Upper EUS  02/24/2021     Family History   Problem Relation Age of Onset    Rheum arthritis Mother     Diabetes Mellitus Mother     Alzheimer's disease Mother     Cancer Father     Liver cancer Father 63        Possibly pancreatic cancer mets to the liver    Pancreatic cancer Father 63    Cancer Brother 8        ALL    Leukemia Brother     Breast cancer Sister     Celiac disease Neg Hx     Cirrhosis Neg Hx     Colon cancer Neg Hx     Colon polyps Neg Hx     Esophageal cancer Neg Hx     Stomach cancer Neg Hx     Ulcerative colitis Neg Hx      Social History     Tobacco Use    Smoking status: Never    Smokeless tobacco: Never     "Tobacco comments:     5 cigars a year   Substance and Sexual Activity    Alcohol use: Yes     Comment: SOCIALLY    Drug use: No    Sexual activity: Yes     Partners: Female        ROS:   Review of Systems   Constitutional:  Positive for malaise/fatigue and weight loss. Negative for fever.   HENT:  Negative for congestion, ear pain, nosebleeds and sore throat.    Eyes:  Negative for blurred vision, double vision and photophobia.   Respiratory:  Negative for cough, hemoptysis, sputum production, shortness of breath, wheezing and stridor.    Cardiovascular:  Negative for chest pain, palpitations, orthopnea and leg swelling.   Gastrointestinal:  Positive for diarrhea. Negative for abdominal pain, blood in stool, constipation, heartburn, melena, nausea and vomiting.        Loss of appetite    Genitourinary:  Negative for dysuria and hematuria.   Musculoskeletal:  Positive for back pain. Negative for myalgias and neck pain.   Skin:  Negative for itching and rash.   Neurological:  Negative for dizziness, focal weakness, seizures, weakness and headaches.   Endo/Heme/Allergies:  Negative for polydipsia. Does not bruise/bleed easily.   Psychiatric/Behavioral:  Positive for depression. Negative for memory loss. The patient is nervous/anxious. The patient does not have insomnia.         Objective:     Vitals:    09/27/23 0926   BP: 139/84   Pulse: 80   Resp: 18   Temp: 97.7 °F (36.5 °C)   TempSrc: Oral   SpO2: 97%   Weight: 90.4 kg (199 lb 4.7 oz)   Height: 5' 10" (1.778 m)   PainSc: 0-No pain     Wt Readings from Last 10 Encounters:   09/27/23 90.4 kg (199 lb 4.7 oz)   07/04/23 93.7 kg (206 lb 8 oz)   01/04/23 94.9 kg (209 lb 3.5 oz)   12/29/22 96.6 kg (213 lb)   12/22/22 96.6 kg (213 lb)   10/19/22 101.1 kg (222 lb 14.2 oz)   10/13/22 98.7 kg (217 lb 9.5 oz)   09/26/22 101.6 kg (224 lb)   06/15/22 99.4 kg (219 lb 2.2 oz)   05/26/22 95.3 kg (210 lb)       Physical Examination:   Physical Exam  Vitals and nursing note reviewed. "   Constitutional:       General: He is not in acute distress.     Appearance: He is not diaphoretic.   HENT:      Head: Normocephalic.      Mouth/Throat:      Pharynx: No oropharyngeal exudate.   Eyes:      General: No scleral icterus.     Conjunctiva/sclera: Conjunctivae normal.   Neck:      Thyroid: No thyromegaly.   Cardiovascular:      Rate and Rhythm: Normal rate and regular rhythm.      Heart sounds: Normal heart sounds. No murmur heard.  Pulmonary:      Effort: Pulmonary effort is normal. No respiratory distress.      Breath sounds: No stridor. No wheezing or rales.   Chest:      Chest wall: No tenderness.   Abdominal:      General: Bowel sounds are normal. There is no distension.      Palpations: Abdomen is soft. There is no mass.      Tenderness: There is no abdominal tenderness. There is no rebound.   Musculoskeletal:         General: No tenderness or deformity. Normal range of motion.      Cervical back: Neck supple.   Lymphadenopathy:      Cervical: No cervical adenopathy.   Skin:     General: Skin is warm and dry.      Findings: No erythema or rash.   Neurological:      Mental Status: He is alert and oriented to person, place, and time.      Cranial Nerves: No cranial nerve deficit.      Coordination: Coordination normal.      Gait: Gait is intact.      Comments: Ambulates with a cane    Psychiatric:         Mood and Affect: Affect normal.         Cognition and Memory: Memory normal.         Judgment: Judgment normal.          Diagnostic Tests:  Significant Imaging: I have reviewed and interpreted all pertinent imaging results/findings.      Laboratory Data:  All pertinent labs have been reviewed.  Labs:   Lab Results   Component Value Date    WBC 5.24 06/26/2023    RBC 4.19 (L) 06/26/2023    HGB 13.3 (L) 06/26/2023    HCT 40.4 06/26/2023    MCV 96 06/26/2023     06/26/2023     (H) 06/26/2023     06/26/2023    K 3.8 06/26/2023    BUN 14 06/26/2023    CREATININE 1.0 06/26/2023    AST  23 06/26/2023    ALT 20 06/26/2023    BILITOT 1.5 (H) 06/26/2023       Assessment/Plan:   Malignant neoplasm of sigmoid colon  pT3N1a, Stage III   He is status post resection.  Adjuvant chemotherapy for stage III disease was discussed at length with the patient and his family during the initial visit.  See  consult for details.  He declined adjuvant chemotherapy.    Labs, scan results were discussed.  There is no evidence for recurrent or metastatic disease.  Labs are stable.  CEA is normal.       We will continue active surveillance.   We will continue to see him every 3-6 months, with CT scans done every 6 months for 3-5  Years.  CTs will be done prior to his next visit. Colonoscopy is due in 2025.     Pernicious anemia  Continue B12 injections monthly.     Weight loss  No evidence for metastatic disease. Slow, ongoing weight loss is multifactorial. Will follow up with PMD to discuss appetite stimulants.     Moderate episode of recurrent major depressive disorder  Continue Zoloft and Wellbutrin.     A fib  Continue Cardiology follow up, on xeralto.     DM  Diet controlled.        ECOG SCORE    2 - Capable of all selfcare but unable to carry out any work activities, active > 50% of hours           Discussion:   No follow-ups on file.    Plan was discussed with the patient at length, and he verbalized understanding. Placido was given an opportunity to ask questions that were answered to his satisfaction, and he was advised to call in the interval if any problems or questions arise.    Electronically signed by Tasha Venegas MD        Route Chart for Scheduling    Med Onc Chart Routing      Follow up with physician 3 months. first week of Jan is ok   Follow up with [facility]    Infusion scheduling note    Injection scheduling note    Labs CBC, CMP and CEA   Scheduling:  Preferred lab:  Lab interval:     Imaging CT chest abdomen pelvis      Pharmacy appointment    Other referrals

## 2023-11-11 DIAGNOSIS — D50.0 IRON DEFICIENCY ANEMIA DUE TO CHRONIC BLOOD LOSS: ICD-10-CM

## 2023-11-12 RX ORDER — FERROUS SULFATE 325(65) MG
325 TABLET ORAL DAILY
Qty: 90 TABLET | Refills: 0 | Status: SHIPPED | OUTPATIENT
Start: 2023-11-12 | End: 2024-02-10

## 2023-11-20 ENCOUNTER — TELEPHONE (OUTPATIENT)
Dept: HEMATOLOGY/ONCOLOGY | Facility: CLINIC | Age: 83
End: 2023-11-20
Payer: MEDICARE

## 2023-11-20 NOTE — TELEPHONE ENCOUNTER
----- Message from Dae Arellano sent at 11/20/2023  9:45 AM CST -----  Type: Appointment Request    Caller is requesting an appointment.    Name of Caller:pt   When is the first available appointment?none   Symptoms:follow up   Would the patient rather a call back or a response via [facility]? call  Best Call Back Number: [phone]  Additional Information: pt received an letter in the mail to schedule an appt for january

## 2023-11-28 ENCOUNTER — TELEPHONE (OUTPATIENT)
Dept: GASTROENTEROLOGY | Facility: CLINIC | Age: 83
End: 2023-11-28
Payer: MEDICARE

## 2023-11-28 NOTE — TELEPHONE ENCOUNTER
"----- Message from Venus Galdaemz sent at 11/28/2023  9:12 AM CST -----  Regarding: Appt request  Contact: @ [phone]  Scheduling Request      Patient Status: Ep    Scheduling Appt: possible ulcer    Time/Date Preference: Soonest available    Relationship to Patient?: Wife     Contact Preference?: call pt     Do you feel you need to be seen today? No    Additional Notes: pt was last seen in 2022 and wants to see this provider again. Pts wife stated that the provider put him on medication 2 years ago for ulcers. Pt wants to make sure he doesn't have one as well as having a lot belching   "Thank you for all that you do for our patients"     " Otolaryngologist Procedure Text (F): After obtaining clear surgical margins the patient was sent to otolaryngology for surgical repair.  The patient understands they will receive post-surgical care and follow-up from the referring physician's office.

## 2023-12-04 ENCOUNTER — TELEPHONE (OUTPATIENT)
Dept: GASTROENTEROLOGY | Facility: CLINIC | Age: 83
End: 2023-12-04
Payer: MEDICARE

## 2023-12-04 NOTE — TELEPHONE ENCOUNTER
----- Message from Veronica Fairchild sent at 12/4/2023  9:30 AM CST -----  Regarding: appt r/s; canceled 12/6/2023  Contact: Ceci (wife) @  [phone]  Caller, Ceci (wife) called in to schedule an appt; no available appts in Epic. Caller is asking for a return call back to schedule. Thanks.     Reason appt not schedule: none available in Epic. Caller is asking to please schedule pt in the middle of January 2024.    Patient's DX:  poss ulcer    Patient requesting call back or [facility] msg: call back.

## 2023-12-19 DIAGNOSIS — M51.36 DDD (DEGENERATIVE DISC DISEASE), LUMBAR: Primary | ICD-10-CM

## 2023-12-20 ENCOUNTER — TELEPHONE (OUTPATIENT)
Dept: GASTROENTEROLOGY | Facility: CLINIC | Age: 83
End: 2023-12-20
Payer: MEDICARE

## 2024-01-09 ENCOUNTER — TELEPHONE (OUTPATIENT)
Dept: HEMATOLOGY/ONCOLOGY | Facility: CLINIC | Age: 84
End: 2024-01-09
Payer: MEDICARE

## 2024-01-09 ENCOUNTER — LAB VISIT (OUTPATIENT)
Dept: LAB | Facility: HOSPITAL | Age: 84
End: 2024-01-09
Attending: INTERNAL MEDICINE
Payer: MEDICARE

## 2024-01-09 ENCOUNTER — OFFICE VISIT (OUTPATIENT)
Dept: HEMATOLOGY/ONCOLOGY | Facility: CLINIC | Age: 84
End: 2024-01-09
Payer: MEDICARE

## 2024-01-09 VITALS
TEMPERATURE: 98 F | BODY MASS INDEX: 29.01 KG/M2 | WEIGHT: 202.63 LBS | HEART RATE: 80 BPM | HEIGHT: 70 IN | RESPIRATION RATE: 18 BRPM | SYSTOLIC BLOOD PRESSURE: 123 MMHG | DIASTOLIC BLOOD PRESSURE: 79 MMHG | OXYGEN SATURATION: 96 %

## 2024-01-09 DIAGNOSIS — E11.9 TYPE 2 DIABETES MELLITUS WITHOUT COMPLICATION, WITHOUT LONG-TERM CURRENT USE OF INSULIN: ICD-10-CM

## 2024-01-09 DIAGNOSIS — C18.7 MALIGNANT NEOPLASM OF SIGMOID COLON: Primary | ICD-10-CM

## 2024-01-09 DIAGNOSIS — D51.0 PERNICIOUS ANEMIA: ICD-10-CM

## 2024-01-09 DIAGNOSIS — C18.9 MALIGNANT NEOPLASM OF COLON, UNSPECIFIED PART OF COLON: ICD-10-CM

## 2024-01-09 DIAGNOSIS — C18.9 MALIGNANT NEOPLASM OF COLON, UNSPECIFIED PART OF COLON: Chronic | ICD-10-CM

## 2024-01-09 DIAGNOSIS — F33.1 MODERATE EPISODE OF RECURRENT MAJOR DEPRESSIVE DISORDER: ICD-10-CM

## 2024-01-09 LAB
ALBUMIN SERPL BCP-MCNC: 3.5 G/DL (ref 3.5–5.2)
ALP SERPL-CCNC: 88 U/L (ref 55–135)
ALT SERPL W/O P-5'-P-CCNC: 34 U/L (ref 10–44)
ANION GAP SERPL CALC-SCNC: 8 MMOL/L (ref 8–16)
AST SERPL-CCNC: 35 U/L (ref 10–40)
BASOPHILS # BLD AUTO: 0.02 K/UL (ref 0–0.2)
BASOPHILS NFR BLD: 0.3 % (ref 0–1.9)
BILIRUB SERPL-MCNC: 0.9 MG/DL (ref 0.1–1)
BUN SERPL-MCNC: 19 MG/DL (ref 8–23)
CALCIUM SERPL-MCNC: 9.2 MG/DL (ref 8.7–10.5)
CEA SERPL-MCNC: 2.1 NG/ML (ref 0–5)
CHLORIDE SERPL-SCNC: 112 MMOL/L (ref 95–110)
CO2 SERPL-SCNC: 25 MMOL/L (ref 23–29)
CREAT SERPL-MCNC: 0.9 MG/DL (ref 0.5–1.4)
DIFFERENTIAL METHOD BLD: ABNORMAL
EOSINOPHIL # BLD AUTO: 0.2 K/UL (ref 0–0.5)
EOSINOPHIL NFR BLD: 3.8 % (ref 0–8)
ERYTHROCYTE [DISTWIDTH] IN BLOOD BY AUTOMATED COUNT: 15.6 % (ref 11.5–14.5)
EST. GFR  (NO RACE VARIABLE): >60 ML/MIN/1.73 M^2
FERRITIN SERPL-MCNC: 521 NG/ML (ref 20–300)
GLUCOSE SERPL-MCNC: 86 MG/DL (ref 70–110)
HCT VFR BLD AUTO: 38.5 % (ref 40–54)
HGB BLD-MCNC: 12.6 G/DL (ref 14–18)
IMM GRANULOCYTES # BLD AUTO: 0.01 K/UL (ref 0–0.04)
IMM GRANULOCYTES NFR BLD AUTO: 0.2 % (ref 0–0.5)
IRON SERPL-MCNC: 79 UG/DL (ref 45–160)
LYMPHOCYTES # BLD AUTO: 1.4 K/UL (ref 1–4.8)
LYMPHOCYTES NFR BLD: 24.8 % (ref 18–48)
MCH RBC QN AUTO: 32.6 PG (ref 27–31)
MCHC RBC AUTO-ENTMCNC: 32.7 G/DL (ref 32–36)
MCV RBC AUTO: 100 FL (ref 82–98)
MONOCYTES # BLD AUTO: 0.6 K/UL (ref 0.3–1)
MONOCYTES NFR BLD: 10.8 % (ref 4–15)
NEUTROPHILS # BLD AUTO: 3.5 K/UL (ref 1.8–7.7)
NEUTROPHILS NFR BLD: 60.1 % (ref 38–73)
NRBC BLD-RTO: 0 /100 WBC
PLATELET # BLD AUTO: 153 K/UL (ref 150–450)
PMV BLD AUTO: 9.9 FL (ref 9.2–12.9)
POTASSIUM SERPL-SCNC: 4.8 MMOL/L (ref 3.5–5.1)
PROT SERPL-MCNC: 6.7 G/DL (ref 6–8.4)
RBC # BLD AUTO: 3.87 M/UL (ref 4.6–6.2)
SATURATED IRON: 27 % (ref 20–50)
SODIUM SERPL-SCNC: 145 MMOL/L (ref 136–145)
TOTAL IRON BINDING CAPACITY: 296 UG/DL (ref 250–450)
TRANSFERRIN SERPL-MCNC: 200 MG/DL (ref 200–375)
WBC # BLD AUTO: 5.76 K/UL (ref 3.9–12.7)

## 2024-01-09 PROCEDURE — 85025 COMPLETE CBC W/AUTO DIFF WBC: CPT | Performed by: INTERNAL MEDICINE

## 2024-01-09 PROCEDURE — 1126F AMNT PAIN NOTED NONE PRSNT: CPT | Mod: CPTII,S$GLB,, | Performed by: INTERNAL MEDICINE

## 2024-01-09 PROCEDURE — 82728 ASSAY OF FERRITIN: CPT | Performed by: INTERNAL MEDICINE

## 2024-01-09 PROCEDURE — 1101F PT FALLS ASSESS-DOCD LE1/YR: CPT | Mod: CPTII,S$GLB,, | Performed by: INTERNAL MEDICINE

## 2024-01-09 PROCEDURE — 99999 PR PBB SHADOW E&M-EST. PATIENT-LVL IV: CPT | Mod: PBBFAC,,, | Performed by: INTERNAL MEDICINE

## 2024-01-09 PROCEDURE — 3288F FALL RISK ASSESSMENT DOCD: CPT | Mod: CPTII,S$GLB,, | Performed by: INTERNAL MEDICINE

## 2024-01-09 PROCEDURE — 36415 COLL VENOUS BLD VENIPUNCTURE: CPT | Performed by: INTERNAL MEDICINE

## 2024-01-09 PROCEDURE — 99214 OFFICE O/P EST MOD 30 MIN: CPT | Mod: S$GLB,,, | Performed by: INTERNAL MEDICINE

## 2024-01-09 PROCEDURE — 83540 ASSAY OF IRON: CPT | Performed by: INTERNAL MEDICINE

## 2024-01-09 PROCEDURE — 82378 CARCINOEMBRYONIC ANTIGEN: CPT | Performed by: INTERNAL MEDICINE

## 2024-01-09 PROCEDURE — 3078F DIAST BP <80 MM HG: CPT | Mod: CPTII,S$GLB,, | Performed by: INTERNAL MEDICINE

## 2024-01-09 PROCEDURE — 3074F SYST BP LT 130 MM HG: CPT | Mod: CPTII,S$GLB,, | Performed by: INTERNAL MEDICINE

## 2024-01-09 PROCEDURE — 80053 COMPREHEN METABOLIC PANEL: CPT | Performed by: INTERNAL MEDICINE

## 2024-01-09 PROCEDURE — 1159F MED LIST DOCD IN RCRD: CPT | Mod: CPTII,S$GLB,, | Performed by: INTERNAL MEDICINE

## 2024-01-09 NOTE — PROGRESS NOTES
PROGRESS NOTE    Subjective:       Patient ID: Placido Howe is a 83 y.o. male.  MRN: [id number]  : 1940    Chief Complaint: Colon Cancer (Depression,fatigue) and Pain (back)  Sigmoid colon adenocarcinoma, stage III    History of Present Illness:   Placido Howe is a 83 y.o. male who presents with sigmoid colon adenocarcinoma.  He is accompanied by his wife today.       As previously documented, the patient was found to have iron deficiency anemia as well as intermittent diarrhea earlier in . He underwent colonoscopy on 03/10/2021 for further evaluation and a mass was found in distal descending colon.  He also reported a weight loss of about 25 lb over the past 1 year.      He has other medical issues including a history of DVT, PE, status post IVC filter.  He also has a permanent pacemaker, hypertension, hyperlipidemia, diabetes mellitus.     Staging workup was negative.  He saw Dr. Mayo and underwent a colon resection and lymph node evaluation on 2021.  Tumor was 2.2 cm, one of 23 lymph nodes were positive for adenocarcinoma, he was MSI stable.     His postop course was complicated by recurrent colitis.  [place] [facility] [place] colitis.  C diff was negative at that time.  He was treated with ciprofloxacin and metronidazole.   He was admitted to the hospital again  from 21-21 when he was found to have C diff colitis.  He was initially treated with metronidazole and vancomycin and discharged home to complete 2.5 weeks of vancomycin.    He had colonoscopy on 22, showed diverticulosis, no polyps. Repeat in 3 years. EGD showed gastritis, Protonix started. Biopsy negative for malignancy.     Underwent PPM generator change and upgrade to CRT P device on 22 for complete heart block and EF 40%     Interim history:  He presents today to discuss his lab, symptoms and further  recommendations.    Had a fall and a hip fracture, s/p surgery in July, has completed Rehab.   Had another fall in Dec, ambulating with a rolling walker.   Reports continued weight loss and loss of appetite.   Stable depression.     Tried oral iron tablets but did not tolerated due to abdominal pain and has stopped.       Oncology History:  Oncology History   Colon cancer   3/31/2021 Initial Diagnosis    Colon cancer     5/19/2021 Cancer Staged    Staging form: Colon and Rectum, AJCC 8th Edition  - Clinical: Stage IIIB (cT3, cN1, cM0)       3/31/21  Path:  1. SIGMOID COLON, RESECTION:   Adenocarcinoma, moderately differentiated, 2.2cm   Tumor invades subserosal adipose tissue   Margins are all negative   One of twenty three lymph nodes positive for metastatic adenocarcinoma (1/23  Pathologic Staging - p T3 N1a Mx     6/26/23  CT chest abd pelvis   Impression:     Postoperative changes of prior sigmoid colon resection.  No evidence for residual or recurrent disease.  No evidence for metastatic disease.     Tiny pulmonary micro nodules, stable from prior examinations.  No consolidation or pleural effusions.  No new nodules.     Fibrotic changes in the lung bases.     Bilateral renal cysts, some of which are hyperdense, unchanged from prior examinations.     History:  Past Medical History:   Diagnosis Date    Alopecia     Anemia     Anxiety     Arthritis     Back pain     Colon cancer     sigmoid adenocarcinoma s/p sigmoid colectomy 3/31/21    Deep vein thrombosis     Depression     Diabetes mellitus     History of kidney stones     Hx of psychiatric care     Hyperlipidemia     Hypertension 04/03/2014    Impaired glucose tolerance 04/03/2014    Numbness     left hand    Obesity (BMI 30-39.9) 04/03/2014    Pulmonary embolism     S/P parathyroidectomy 08/29/2014    Therapy     Type 2 diabetes mellitus, uncontrolled 04/07/2014    Vitamin B12 deficiency 07/10/2014    Vitamin D deficiency 07/10/2014      Past Surgical  History:   Procedure Laterality Date    ABDOMINAL SURGERY      CARDIAC PACEMAKER PLACEMENT      CARPAL TUNNEL RELEASE  2013    right hand    carpel tunnel      right hand    CATARACT EXTRACTION  2001    left and right eyes    CHOLECYSTECTOMY      COLONOSCOPY N/A 03/10/2021    Procedure: COLONOSCOPY;  Surgeon: Iván Colunga MD;  Location: [facility] (4TH FLR);  Service: Endoscopy;  Laterality: N/A;  ok to hold xarelto x3 days per Dr. Brewer, see media 12/23-KPvt  medtronic pacemaker  C-Diff negative - see micro - ERW  COVID + on 1/22/21 , per endo protocol, no further testing needed - ERW    COLONOSCOPY N/A 05/26/2022    Procedure: COLONOSCOPY;  Surgeon: Iván Colunga MD;  Location: [facility] (4TH FLR);  Service: Endoscopy;  Laterality: N/A;    ENDOSCOPIC ULTRASOUND OF UPPER GASTROINTESTINAL TRACT N/A 02/24/2021    Procedure: ULTRASOUND, UPPER GI TRACT, ENDOSCOPIC;  Surgeon: Alessio Wilde MD;  Location: [facility];  Service: Endoscopy;  Laterality: N/A;  Upper endoscopy the pancreas for evaluation of weight loss diarrhea for a year early satiety anorexia and family history of her first-degree relative with pancreatic cancer his father at age 63    ESOPHAGOGASTRODUODENOSCOPY  02/24/2021    ESOPHAGOGASTRODUODENOSCOPY N/A 02/24/2021    Procedure: EGD (ESOPHAGOGASTRODUODENOSCOPY);  Surgeon: Alessio Wilde MD;  Location: [facility];  Service: Endoscopy;  Laterality: N/A;  Please rule out celiac sprue/NO covid needed MP    ESOPHAGOGASTRODUODENOSCOPY N/A 03/10/2021    Procedure: EGD (ESOPHAGOGASTRODUODENOSCOPY);  Surgeon: Iván Colunga MD;  [address]  Service: Endoscopy;  Laterality: N/A;  Will need to test for gastric pH day of case.  Patient needs to be Off all Proton Pump Inhibitors and H2 blocker medications for 2 (Two) weeks prior to EGD.  Nexium (esomeprazole)  Prilosec (omeprazole)   Protonix (pantoprazole)  Prevacid (lansoprazole    ESOPHAGOGASTRODUODENOSCOPY N/A 05/26/2022     Procedure: EGD (ESOPHAGOGASTRODUODENOSCOPY);  Surgeon: Iván Colunga MD;  Location: [facility] ENDO (4TH FLR);  Service: Endoscopy;  Laterality: N/A;  Fully Vaccinated, Cardiac clearance and Hold Xarelto x 2 days per Dr. Garcia see media 4/7/22, Medtronic Pacemaker, prep instr mailed and portal -ml    FLEXIBLE SIGMOIDOSCOPY N/A 03/31/2021    Procedure: SIGMOIDOSCOPY, FLEXIBLE;  Surgeon: JOHNNY Mayo MD;  Location: [facility] OR 2ND FLR;  Service: Colon and Rectal;  Laterality: N/A;    IMPLANTATION OF BIVENTRICULAR HEART PACEMAKER N/A 12/28/2022    Procedure: INSERTION, PACEMAKER, BIVENTRICULAR;  Surgeon: Melvin Garcia MD;  Location: [facility] CATH;  Service: Cardiology;  Laterality: N/A;  PPM generator replacement with upgrade to CRT pacemaker    IVC FILTER RETRIEVAL      JOINT REPLACEMENT      [facility]    KNEE ARTHROSCOPY W/ MENISCAL REPAIR      right    LAPAROSCOPIC SIGMOIDECTOMY N/A 03/31/2021    Procedure: COLECTOMY, SIGMOID, LAPAROSCOPIC, ERAS high;  Surgeon: JOHNNY Mayo MD;  Location: [facility] OR 2ND FLR;  Service: Colon and Rectal;  Laterality: N/A;    MOBILIZATION OF SPLENIC FLEXURE N/A 03/31/2021    Procedure: MOBILIZATION, SPLENIC FLEXURE;  Surgeon: JOHNNY Mayo MD;  Location: [facility] OR 2ND FLR;  Service: Colon and Rectal;  Laterality: N/A;    REPAIR OF CAROTID ARTERY Right     THYROID SURGERY  08/2014    patient denies, thinks this was for his wife    TONSILLECTOMY      when pt at 3 y/o    Upper EUS  02/24/2021     Family History   Problem Relation Age of Onset    Rheum arthritis Mother     Diabetes Mellitus Mother     Alzheimer's disease Mother     Cancer Father     Liver cancer Father 63        Possibly pancreatic cancer mets to the liver    Pancreatic cancer Father 63    Cancer Brother 8        ALL    Leukemia Brother     Breast cancer Sister     Celiac disease Neg Hx     Cirrhosis Neg Hx     Colon cancer Neg Hx     Colon polyps Neg Hx     Esophageal cancer Neg Hx     Stomach cancer Neg Hx   "   Ulcerative colitis Neg Hx      Social History     Tobacco Use    Smoking status: Never    Smokeless tobacco: Never    Tobacco comments:     5 cigars a year   Substance and Sexual Activity    Alcohol use: Yes     Comment: SOCIALLY    Drug use: No    Sexual activity: Yes     Partners: Female        ROS:   Review of Systems   Constitutional:  Positive for malaise/fatigue and weight loss. Negative for fever.   HENT:  Negative for congestion, ear pain, nosebleeds and sore throat.    Eyes:  Negative for blurred vision, double vision and photophobia.   Respiratory:  Negative for cough, hemoptysis, sputum production, shortness of breath, wheezing and stridor.    Cardiovascular:  Negative for chest pain, palpitations, orthopnea and leg swelling.   Gastrointestinal:  Positive for diarrhea. Negative for abdominal pain, blood in stool, constipation, heartburn, melena, nausea and vomiting.        Loss of appetite    Genitourinary:  Negative for dysuria and hematuria.   Musculoskeletal:  Positive for back pain. Negative for myalgias and neck pain.   Skin:  Negative for itching and rash.   Neurological:  Negative for dizziness, focal weakness, seizures, weakness and headaches.   Endo/Heme/Allergies:  Negative for polydipsia. Does not bruise/bleed easily.   Psychiatric/Behavioral:  Positive for depression. Negative for memory loss. The patient is nervous/anxious. The patient does not have insomnia.         Objective:     Vitals:    01/09/24 1050   BP: 123/79   Pulse: 80   Resp: 18   Temp: 97.7 °F (36.5 °C)   TempSrc: Oral   SpO2: 96%   Weight: 91.9 kg (202 lb 9.6 oz)   Height: 5' 10" (1.778 m)   PainSc: 0-No pain       Wt Readings from Last 10 Encounters:   01/09/24 91.9 kg (202 lb 9.6 oz)   12/02/23 98.7 kg (217 lb 9.5 oz)   09/27/23 90.4 kg (199 lb 4.7 oz)   07/04/23 93.7 kg (206 lb 8 oz)   01/04/23 94.9 kg (209 lb 3.5 oz)   12/29/22 96.6 kg (213 lb)   12/22/22 96.6 kg (213 lb)   10/19/22 101.1 kg (222 lb 14.2 oz)   10/13/22 " 98.7 kg (217 lb 9.5 oz)   09/26/22 101.6 kg (224 lb)       Physical Examination:   Physical Exam  Vitals and nursing note reviewed.   Constitutional:       General: He is not in acute distress.     Appearance: He is not diaphoretic.   HENT:      Head: Normocephalic.      Mouth/Throat:      Pharynx: No oropharyngeal exudate.   Eyes:      General: No scleral icterus.     Conjunctiva/sclera: Conjunctivae normal.   Neck:      Thyroid: No thyromegaly.   Cardiovascular:      Rate and Rhythm: Normal rate and regular rhythm.      Heart sounds: Normal heart sounds. No murmur heard.  Pulmonary:      Effort: Pulmonary effort is normal. No respiratory distress.      Breath sounds: No stridor. No wheezing or rales.   Chest:      Chest wall: No tenderness.   Abdominal:      General: Bowel sounds are normal. There is no distension.      Palpations: Abdomen is soft. There is no mass.      Tenderness: There is no abdominal tenderness. There is no rebound.   Musculoskeletal:         General: No tenderness or deformity. Normal range of motion.      Cervical back: Neck supple.   Lymphadenopathy:      Cervical: No cervical adenopathy.   Skin:     General: Skin is warm and dry.      Findings: No erythema or rash.   Neurological:      Mental Status: He is alert and oriented to person, place, and time.      Cranial Nerves: No cranial nerve deficit.      Coordination: Coordination normal.      Gait: Gait is intact.      Comments: Ambulates with a rolling walker    Psychiatric:         Mood and Affect: Affect normal.         Cognition and Memory: Memory normal.         Judgment: Judgment normal.          Diagnostic Tests:  Significant Imaging: I have reviewed and interpreted all pertinent imaging results/findings.      Laboratory Data:  All pertinent labs have been reviewed.  Labs:   Lab Results   Component Value Date    WBC 5.76 01/09/2024    RBC 3.87 (L) 01/09/2024    HGB 12.6 (L) 01/09/2024    HCT 38.5 (L) 01/09/2024     (H)  01/09/2024     01/09/2024    GLU 86 01/09/2024     01/09/2024    K 4.8 01/09/2024    BUN 19 01/09/2024    CREATININE 0.9 01/09/2024    AST 35 01/09/2024    ALT 34 01/09/2024    BILITOT 0.9 01/09/2024       Assessment/Plan:   Malignant neoplasm of sigmoid colon  pT3N1a, Stage III   He is status post resection.  Adjuvant chemotherapy for stage III disease was discussed at length with the patient and his family during the initial visit.  See  consult for details.  He declined adjuvant chemotherapy.      Labs are stable.  CEA is normal.  He is due for a follow up scan and will request at this time.      We will continue active surveillance.   We will continue to see him every 3-6 months, with CT scans done every 6 months for 3-5  Years.  CTs will be done prior to his next visit. Colonoscopy is due in 2025.     Pernicious anemia  Continue B12 injections monthly.     YENIFER  Does not tolerate oral iron. Check iron panel today and offer IV iron if clinically indicated.     Moderate episode of recurrent major depressive disorder  Continue Zoloft and Wellbutrin.     A fib  Continue Cardiology follow up, on xeralto.     DM  Diet controlled.        ECOG SCORE               Discussion:   No follow-ups on file.    Plan was discussed with the patient at length, and he verbalized understanding. Placido was given an opportunity to ask questions that were answered to his satisfaction, and he was advised to call in the interval if any problems or questions arise.    Electronically signed by Tasha Venegas MD        Route Chart for Scheduling    Med Onc Chart Routing      Follow up with physician 6 months.   Follow up with [facility]    Infusion scheduling note    Injection scheduling note    Labs CBC, CMP and CEA   Scheduling:  Preferred lab:  Lab interval:  prior to md visit   Imaging CT chest abdomen pelvis   asap to be scheduled at [place]   Pharmacy appointment    Other referrals

## 2024-02-02 ENCOUNTER — TELEPHONE (OUTPATIENT)
Dept: HEMATOLOGY/ONCOLOGY | Facility: CLINIC | Age: 84
End: 2024-02-02
Payer: MEDICARE

## 2024-02-02 NOTE — TELEPHONE ENCOUNTER
Spoke to patient's wife regarding iron lab results. Message sent to dr dupont for lab results clarification and question if IV iron is needed. Will follow up with patient's wife.       ----- Message from Teresa Smith sent at 2/2/2024  9:18 AM CST -----  Regarding: Medical Assistance  Contact: Patient Spouse Ceci  Spouse is requesting a call back in regards to lab work completed by patient for Iron   Please Assist     Patient Spouse Ceci can be reached at  [phone]

## 2024-02-16 PROBLEM — S32.010A COMPRESSION FRACTURE OF L1 LUMBAR VERTEBRA: Status: ACTIVE | Noted: 2024-02-16

## 2024-03-11 PROBLEM — S32.010D: Status: ACTIVE | Noted: 2024-02-16

## 2024-05-30 ENCOUNTER — TELEPHONE (OUTPATIENT)
Dept: HEMATOLOGY/ONCOLOGY | Facility: CLINIC | Age: 84
End: 2024-05-30
Payer: MEDICARE

## 2024-05-30 DIAGNOSIS — C18.9 MALIGNANT NEOPLASM OF COLON, UNSPECIFIED PART OF COLON: Primary | ICD-10-CM

## 2024-06-28 ENCOUNTER — TELEPHONE (OUTPATIENT)
Dept: HEMATOLOGY/ONCOLOGY | Facility: CLINIC | Age: 84
End: 2024-06-28
Payer: MEDICARE

## 2024-06-28 NOTE — TELEPHONE ENCOUNTER
----- Message from Catalino Bright sent at 6/28/2024  9:18 AM CDT -----  Type:  Patient Returning Call    Who Called:PT  Who Left Message for Patient:  Does the patient know what this is regarding?:APPT RESCHEDULE  Would the patient rather a call back or a response via [facility]? CALL  Best Call Back Number:  [phone]  Additional Information: Pt states he needs to reschedule appt to either a Tuesday or a Thursday. Thank you

## 2024-06-28 NOTE — TELEPHONE ENCOUNTER
Returned call to patient and left a voicemail.  Scheduled first available Thursday with lab work prior.  Left instructions on voicemail to return call to clinic if these dates and times do not work.  Also send appointment letter in mail.

## 2024-07-29 ENCOUNTER — LAB VISIT (OUTPATIENT)
Dept: LAB | Facility: HOSPITAL | Age: 84
End: 2024-07-29
Attending: INTERNAL MEDICINE
Payer: MEDICARE

## 2024-07-29 ENCOUNTER — INFUSION (OUTPATIENT)
Dept: INFUSION THERAPY | Facility: HOSPITAL | Age: 84
End: 2024-07-29
Payer: MEDICARE

## 2024-07-29 ENCOUNTER — OFFICE VISIT (OUTPATIENT)
Dept: HEMATOLOGY/ONCOLOGY | Facility: CLINIC | Age: 84
End: 2024-07-29
Payer: MEDICARE

## 2024-07-29 VITALS
OXYGEN SATURATION: 97 % | DIASTOLIC BLOOD PRESSURE: 59 MMHG | WEIGHT: 205.81 LBS | HEART RATE: 80 BPM | TEMPERATURE: 98 F | SYSTOLIC BLOOD PRESSURE: 94 MMHG | HEIGHT: 69 IN | BODY MASS INDEX: 30.48 KG/M2

## 2024-07-29 VITALS
TEMPERATURE: 99 F | DIASTOLIC BLOOD PRESSURE: 66 MMHG | OXYGEN SATURATION: 99 % | SYSTOLIC BLOOD PRESSURE: 116 MMHG | RESPIRATION RATE: 18 BRPM | HEART RATE: 80 BPM

## 2024-07-29 DIAGNOSIS — E86.0 DEHYDRATION: Primary | ICD-10-CM

## 2024-07-29 DIAGNOSIS — E86.0 DEHYDRATION: ICD-10-CM

## 2024-07-29 DIAGNOSIS — D51.0 PERNICIOUS ANEMIA: ICD-10-CM

## 2024-07-29 DIAGNOSIS — E11.9 TYPE 2 DIABETES MELLITUS WITHOUT COMPLICATION, WITHOUT LONG-TERM CURRENT USE OF INSULIN: ICD-10-CM

## 2024-07-29 DIAGNOSIS — D50.0 IRON DEFICIENCY ANEMIA DUE TO CHRONIC BLOOD LOSS: ICD-10-CM

## 2024-07-29 DIAGNOSIS — Z85.038 HISTORY OF COLON CANCER, STAGE III: Primary | ICD-10-CM

## 2024-07-29 DIAGNOSIS — C18.9 MALIGNANT NEOPLASM OF COLON, UNSPECIFIED PART OF COLON: ICD-10-CM

## 2024-07-29 DIAGNOSIS — F32.A DEPRESSION, UNSPECIFIED DEPRESSION TYPE: ICD-10-CM

## 2024-07-29 DIAGNOSIS — I48.0 PAF (PAROXYSMAL ATRIAL FIBRILLATION): ICD-10-CM

## 2024-07-29 LAB
ALBUMIN SERPL BCP-MCNC: 3.4 G/DL (ref 3.5–5.2)
ALP SERPL-CCNC: 77 U/L (ref 55–135)
ALT SERPL W/O P-5'-P-CCNC: 43 U/L (ref 10–44)
ANION GAP SERPL CALC-SCNC: 10 MMOL/L (ref 8–16)
AST SERPL-CCNC: 44 U/L (ref 10–40)
BASOPHILS # BLD AUTO: 0.02 K/UL (ref 0–0.2)
BASOPHILS NFR BLD: 0.3 % (ref 0–1.9)
BILIRUB SERPL-MCNC: 0.7 MG/DL (ref 0.1–1)
BUN SERPL-MCNC: 42 MG/DL (ref 8–23)
CALCIUM SERPL-MCNC: 9.1 MG/DL (ref 8.7–10.5)
CEA SERPL-MCNC: 1.7 NG/ML (ref 0–5)
CHLORIDE SERPL-SCNC: 110 MMOL/L (ref 95–110)
CO2 SERPL-SCNC: 20 MMOL/L (ref 23–29)
CREAT SERPL-MCNC: 1.5 MG/DL (ref 0.5–1.4)
DIFFERENTIAL METHOD BLD: ABNORMAL
EOSINOPHIL # BLD AUTO: 0.1 K/UL (ref 0–0.5)
EOSINOPHIL NFR BLD: 1.8 % (ref 0–8)
ERYTHROCYTE [DISTWIDTH] IN BLOOD BY AUTOMATED COUNT: 15.9 % (ref 11.5–14.5)
EST. GFR  (NO RACE VARIABLE): 45.9 ML/MIN/1.73 M^2
GLUCOSE SERPL-MCNC: 125 MG/DL (ref 70–110)
HCT VFR BLD AUTO: 40.5 % (ref 40–54)
HGB BLD-MCNC: 12.8 G/DL (ref 14–18)
IMM GRANULOCYTES # BLD AUTO: 0.01 K/UL (ref 0–0.04)
IMM GRANULOCYTES NFR BLD AUTO: 0.2 % (ref 0–0.5)
LYMPHOCYTES # BLD AUTO: 1.1 K/UL (ref 1–4.8)
LYMPHOCYTES NFR BLD: 17.5 % (ref 18–48)
MCH RBC QN AUTO: 30.5 PG (ref 27–31)
MCHC RBC AUTO-ENTMCNC: 31.6 G/DL (ref 32–36)
MCV RBC AUTO: 96 FL (ref 82–98)
MONOCYTES # BLD AUTO: 0.5 K/UL (ref 0.3–1)
MONOCYTES NFR BLD: 8.3 % (ref 4–15)
NEUTROPHILS # BLD AUTO: 4.5 K/UL (ref 1.8–7.7)
NEUTROPHILS NFR BLD: 71.9 % (ref 38–73)
NRBC BLD-RTO: 0 /100 WBC
PLATELET # BLD AUTO: 163 K/UL (ref 150–450)
PMV BLD AUTO: 9.8 FL (ref 9.2–12.9)
POTASSIUM SERPL-SCNC: 4.5 MMOL/L (ref 3.5–5.1)
PROT SERPL-MCNC: 6.9 G/DL (ref 6–8.4)
RBC # BLD AUTO: 4.2 M/UL (ref 4.6–6.2)
SODIUM SERPL-SCNC: 140 MMOL/L (ref 136–145)
WBC # BLD AUTO: 6.24 K/UL (ref 3.9–12.7)

## 2024-07-29 PROCEDURE — 3288F FALL RISK ASSESSMENT DOCD: CPT | Mod: CPTII,S$GLB,, | Performed by: INTERNAL MEDICINE

## 2024-07-29 PROCEDURE — 1100F PTFALLS ASSESS-DOCD GE2>/YR: CPT | Mod: CPTII,S$GLB,, | Performed by: INTERNAL MEDICINE

## 2024-07-29 PROCEDURE — 99214 OFFICE O/P EST MOD 30 MIN: CPT | Mod: S$GLB,,, | Performed by: INTERNAL MEDICINE

## 2024-07-29 PROCEDURE — 36415 COLL VENOUS BLD VENIPUNCTURE: CPT | Performed by: INTERNAL MEDICINE

## 2024-07-29 PROCEDURE — 1126F AMNT PAIN NOTED NONE PRSNT: CPT | Mod: CPTII,S$GLB,, | Performed by: INTERNAL MEDICINE

## 2024-07-29 PROCEDURE — 99999 PR PBB SHADOW E&M-EST. PATIENT-LVL IV: CPT | Mod: PBBFAC,,, | Performed by: INTERNAL MEDICINE

## 2024-07-29 PROCEDURE — 96360 HYDRATION IV INFUSION INIT: CPT

## 2024-07-29 PROCEDURE — 1160F RVW MEDS BY RX/DR IN RCRD: CPT | Mod: CPTII,S$GLB,, | Performed by: INTERNAL MEDICINE

## 2024-07-29 PROCEDURE — 85025 COMPLETE CBC W/AUTO DIFF WBC: CPT | Performed by: INTERNAL MEDICINE

## 2024-07-29 PROCEDURE — 80053 COMPREHEN METABOLIC PANEL: CPT | Performed by: INTERNAL MEDICINE

## 2024-07-29 PROCEDURE — 3078F DIAST BP <80 MM HG: CPT | Mod: CPTII,S$GLB,, | Performed by: INTERNAL MEDICINE

## 2024-07-29 PROCEDURE — 1159F MED LIST DOCD IN RCRD: CPT | Mod: CPTII,S$GLB,, | Performed by: INTERNAL MEDICINE

## 2024-07-29 PROCEDURE — 25000003 PHARM REV CODE 250: Performed by: REGISTERED NURSE

## 2024-07-29 PROCEDURE — 3074F SYST BP LT 130 MM HG: CPT | Mod: CPTII,S$GLB,, | Performed by: INTERNAL MEDICINE

## 2024-07-29 PROCEDURE — G2211 COMPLEX E/M VISIT ADD ON: HCPCS | Mod: S$GLB,,, | Performed by: INTERNAL MEDICINE

## 2024-07-29 PROCEDURE — 82378 CARCINOEMBRYONIC ANTIGEN: CPT | Performed by: INTERNAL MEDICINE

## 2024-07-29 RX ORDER — HEPARIN 100 UNIT/ML
500 SYRINGE INTRAVENOUS
Status: DISCONTINUED | OUTPATIENT
Start: 2024-07-29 | End: 2024-07-29 | Stop reason: HOSPADM

## 2024-07-29 RX ORDER — SODIUM CHLORIDE 0.9 % (FLUSH) 0.9 %
10 SYRINGE (ML) INJECTION
Status: DISCONTINUED | OUTPATIENT
Start: 2024-07-29 | End: 2024-07-29 | Stop reason: HOSPADM

## 2024-07-29 RX ORDER — SODIUM CHLORIDE 0.9 % (FLUSH) 0.9 %
10 SYRINGE (ML) INJECTION
Status: CANCELLED | OUTPATIENT
Start: 2024-07-29

## 2024-07-29 RX ORDER — HEPARIN 100 UNIT/ML
500 SYRINGE INTRAVENOUS
Status: CANCELLED | OUTPATIENT
Start: 2024-07-29

## 2024-07-29 RX ADMIN — SODIUM CHLORIDE 1000 ML: 0.9 INJECTION, SOLUTION INTRAVENOUS at 02:07

## 2024-07-29 NOTE — PLAN OF CARE
Problem: Fall Injury Risk  Goal: Absence of Fall and Fall-Related Injury  Outcome: Progressing     Ambulatory to clinic via wheel chair with family.  PIV inserted, flushed with out difficulty, blood return noted and infused with no problems.  IVF's tolerated with no problems.  Ambulatory home with NAD.

## 2024-07-29 NOTE — PROGRESS NOTES
PROGRESS NOTE    Subjective:      Patient ID: Placido Howe is a 83 y.o. male.  MRN: [id number]  : 1940    Chief Complaint: Colon Cancer (Depression,fatigue) and Pain (back)  Sigmoid colon adenocarcinoma, stage III    History of Present Illness:   Placido Howe is a 83 y.o. male who presents with sigmoid colon adenocarcinoma.  He is accompanied by his wife today.       As previously documented, the patient was found to have iron deficiency anemia as well as intermittent diarrhea earlier in . He underwent colonoscopy on 03/10/2021 for further evaluation and a mass was found in distal descending colon.  He also reported a weight loss of about 25 lb over the past 1 year.      He has other medical issues including a history of DVT, PE, status post IVC filter.  He also has a permanent pacemaker, hypertension, hyperlipidemia, diabetes mellitus.     Staging workup was negative.  He saw Dr. Mayo and underwent a colon resection and lymph node evaluation on 2021.  Tumor was 2.2 cm, one of 23 lymph nodes were positive for adenocarcinoma, he was MSI stable.     His postop course was complicated by recurrent colitis.  He was admitted to [facility] from 4/10/21-21 for colitis.  C diff was negative at that time.  He was treated with ciprofloxacin and metronidazole.   He was admitted to the hospital again  from 21-21 when he was found to have C diff colitis.  He was initially treated with metronidazole and vancomycin and discharged home to complete 2.5 weeks of vancomycin.    He had colonoscopy on 22, showed diverticulosis, no polyps. Repeat in 3 years. EGD showed gastritis, Protonix started. Biopsy negative for malignancy.     Underwent PPM generator change and upgrade to CRT P device on 22 for complete heart block and EF 40%     Interim History:    Mr. Howe returns to clinic today while on surveillance. Notes falls over the last several months  and is doing PT 3x weekly. He is feeling dehydrated today, which they attribute to stomach bug last week. Had diarrhea and some vomiting with decreased oral intake. This has now resolved. Bowels regular and no other changes. No other nausea, vomiting, abdominal pain, or blood in stool.      Oncology History:  Oncology History   Colon cancer   3/31/2021 Initial Diagnosis    Colon cancer     5/19/2021 Cancer Staged    Staging form: Colon and Rectum, AJCC 8th Edition  - Clinical: Stage IIIB (cT3, cN1, cM0)       3/31/21  Path:  1. SIGMOID COLON, RESECTION:   Adenocarcinoma, moderately differentiated, 2.2cm   Tumor invades subserosal adipose tissue   Margins are all negative   One of twenty three lymph nodes positive for metastatic adenocarcinoma (1/23  Pathologic Staging - p T3 N1a Mx     4/17/2024 CT A/P   Impression:  1. Left basilar discoid atelectasis and trace left effusion  2. 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 Cholecystectomy  4. Multiple renal cysts  5. IVC filter in place  6. Diverticulosis of the sigmoid colon but no diverticulitis  7. Old fracture of the right greater trochanter  8. Postsurgical changes of the sigmoid colon hand     History:  Past Medical History:   Diagnosis Date    Alopecia     Anemia     Anxiety     Arthritis     Back pain     CAD (coronary artery disease)     Colon cancer     sigmoid adenocarcinoma s/p sigmoid colectomy 3/31/21    Deep vein thrombosis     Depression     Diabetes mellitus     ARNOLD (dyspnea on exertion)     History of kidney stones     Hx of psychiatric care     Hyperlipidemia     Hypertension 04/03/2014    Impaired glucose tolerance 04/03/2014    Numbness     left hand    Obesity (BMI 30-39.9) 04/03/2014    Pacemaker     PAF (paroxysmal atrial fibrillation)     Pulmonary embolism     S/P parathyroidectomy 08/29/2014    Therapy     Type 2 diabetes mellitus, uncontrolled 04/07/2014    Vitamin B12 deficiency 07/10/2014    Vitamin D deficiency 07/10/2014      Past  Surgical History:   Procedure Laterality Date    ABDOMINAL SURGERY      CARDIAC PACEMAKER PLACEMENT      CARPAL TUNNEL RELEASE  2013    right hand    carpel tunnel      right hand    CATARACT EXTRACTION  2001    left and right eyes    CHOLECYSTECTOMY      COLONOSCOPY N/A 03/10/2021    Procedure: COLONOSCOPY;  Surgeon: Iván Colunga MD;  [facility]  Service: Endoscopy;  Laterality: N/A;  ok to hold xarelto x3 days per Dr. Brewer, see media 12/23-KPvt  medtronic pacemaker  C-Diff negative - see micro - ERW  COVID + on 1/22/21 , per endo protocol, no further testing needed - ERW    COLONOSCOPY N/A 05/26/2022    Procedure: COLONOSCOPY;  Surgeon: Iván Colunga MD;  [facility]  Service: Endoscopy;  Laterality: N/A;    ENDOSCOPIC ULTRASOUND OF UPPER GASTROINTESTINAL TRACT N/A 02/24/2021    Procedure: ULTRASOUND, UPPER GI TRACT, ENDOSCOPIC;  Surgeon: Alessio Wilde MD;  Location: [facility];  Service: Endoscopy;  Laterality: N/A;  Upper endoscopy the pancreas for evaluation of weight loss diarrhea for a year early satiety anorexia and family history of her first-degree relative with pancreatic cancer his father at age 63    ESOPHAGOGASTRODUODENOSCOPY  02/24/2021    ESOPHAGOGASTRODUODENOSCOPY N/A 02/24/2021    Procedure: EGD (ESOPHAGOGASTRODUODENOSCOPY);  Surgeon: Alessio Wilde MD;  Location: [facility];  Service: Endoscopy;  Laterality: N/A;  Please rule out celiac sprue/NO covid needed MP    ESOPHAGOGASTRODUODENOSCOPY N/A 03/10/2021    Procedure: EGD (ESOPHAGOGASTRODUODENOSCOPY);  Surgeon: Iván Colunga MD;  Location: [address]);  Service: Endoscopy;  Laterality: N/A;  Will need to test for gastric pH day of case.  Patient needs to be Off all Proton Pump Inhibitors and H2 blocker medications for 2 (Two) weeks prior to EGD.  Nexium (esomeprazole)  Prilosec (omeprazole)   Protonix (pantoprazole)  Prevacid (lansoprazole    ESOPHAGOGASTRODUODENOSCOPY N/A 05/26/2022     Procedure: EGD (ESOPHAGOGASTRODUODENOSCOPY);  Surgeon: Iván Colunga MD;  Location: [facility] ENDO (4TH FLR);  Service: Endoscopy;  Laterality: N/A;  Fully Vaccinated, Cardiac clearance and Hold Xarelto x 2 days per Dr. Garcia see media 4/7/22, Medtronic Pacemaker, prep instr mailed and portal -ml    FIXATION KYPHOPLASTY N/A 3/21/2024    Procedure: KYPHOPLASTY L1;  Surgeon: Karsten Mejia Jr., MD;  Location: [facility] OR;  Service: Orthopedics;  Laterality: N/A;  Landon, Nitzavation  PACEMAKER  To Roll @ same time as Rm7    FLEXIBLE SIGMOIDOSCOPY N/A 03/31/2021    Procedure: SIGMOIDOSCOPY, FLEXIBLE;  Surgeon: JOHNNY Mayo MD;  Location: [facility] OR 2ND FLR;  Service: Colon and Rectal;  Laterality: N/A;    HERNIA REPAIR      IMPLANTATION OF BIVENTRICULAR HEART PACEMAKER N/A 12/28/2022    Procedure: INSERTION, PACEMAKER, BIVENTRICULAR;  Surgeon: Melvin Garcia MD;  Location: [facility] CATH;  Service: Cardiology;  Laterality: N/A;  PPM generator replacement with upgrade to CRT pacemaker    IVC FILTER RETRIEVAL      JOINT REPLACEMENT      [facility]    KNEE ARTHROSCOPY W/ MENISCAL REPAIR      right    LAPAROSCOPIC SIGMOIDECTOMY N/A 03/31/2021    Procedure: COLECTOMY, SIGMOID, LAPAROSCOPIC, ERAS high;  Surgeon: JOHNNY Mayo MD;  Location: [facility] OR 2ND FLR;  Service: Colon and Rectal;  Laterality: N/A;    MOBILIZATION OF SPLENIC FLEXURE N/A 03/31/2021    Procedure: MOBILIZATION, SPLENIC FLEXURE;  Surgeon: JOHNNY Mayo MD;  Location: [facility] OR 2ND FLR;  Service: Colon and Rectal;  Laterality: N/A;    REPAIR OF CAROTID ARTERY Right     SHOULDER SURGERY Left     THYROID SURGERY  08/2014    patient denies, thinks this was for his wife    TONSILLECTOMY      when pt at 5 y/o    Upper EUS  02/24/2021     Family History   Problem Relation Name Age of Onset    Rheum arthritis Mother      Diabetes Mellitus Mother      Alzheimer's disease Mother      Cancer Father      Liver cancer Father  63        Possibly pancreatic  "cancer mets to the liver    Pancreatic cancer Father  63    Cancer Brother  8        ALL    Leukemia Brother      Breast cancer Sister      Celiac disease Neg Hx      Cirrhosis Neg Hx      Colon cancer Neg Hx      Colon polyps Neg Hx      Esophageal cancer Neg Hx      Stomach cancer Neg Hx      Ulcerative colitis Neg Hx       Social History     Tobacco Use    Smoking status: Never    Smokeless tobacco: Never    Tobacco comments:     5 cigars a year   Substance and Sexual Activity    Alcohol use: Not Currently    Drug use: No    Sexual activity: Not Currently     Partners: Female        ROS:   Review of Systems   Constitutional:  Positive for malaise/fatigue and weight loss. Negative for fever.   HENT:  Negative for congestion, ear pain, nosebleeds and sore throat.    Eyes:  Negative for blurred vision, double vision and photophobia.   Respiratory:  Negative for cough, hemoptysis, sputum production, shortness of breath, wheezing and stridor.    Cardiovascular:  Negative for chest pain, palpitations, orthopnea and leg swelling.   Gastrointestinal:  Negative for abdominal pain, blood in stool, constipation, diarrhea, heartburn, melena, nausea and vomiting.   Genitourinary:  Negative for dysuria and hematuria.   Musculoskeletal:  Negative for back pain, myalgias and neck pain.   Skin:  Negative for itching and rash.   Neurological:  Negative for dizziness, focal weakness, seizures, weakness and headaches.   Endo/Heme/Allergies:  Negative for polydipsia. Does not bruise/bleed easily.   Psychiatric/Behavioral:  Positive for depression. Negative for memory loss. The patient is not nervous/anxious and does not have insomnia.         Objective:     Vitals:    07/29/24 1139 07/29/24 1143   BP: (!) 91/57 (!) 94/59   Pulse: 80    Temp: 98.1 °F (36.7 °C)    TempSrc: Oral    SpO2: 97%    Weight: 93.3 kg (205 lb 12.8 oz)    Height: 5' 9" (1.753 m)    PainSc: 0-No pain        Wt Readings from Last 10 Encounters:   07/29/24 93.3 kg " (205 lb 12.8 oz)   04/17/24 95.3 kg (210 lb)   03/21/24 92.1 kg (203 lb)   03/20/24 92.1 kg (203 lb)   02/16/24 93 kg (205 lb)   01/09/24 91.9 kg (202 lb 9.6 oz)   12/02/23 98.7 kg (217 lb 9.5 oz)   09/27/23 90.4 kg (199 lb 4.7 oz)   07/04/23 93.7 kg (206 lb 8 oz)   01/04/23 94.9 kg (209 lb 3.5 oz)       Physical Examination:   Physical Exam  Vitals and nursing note reviewed.   Constitutional:       General: He is not in acute distress.     Appearance: Normal appearance. He is not ill-appearing, toxic-appearing or diaphoretic.   HENT:      Head: Normocephalic and atraumatic.   Eyes:      General: No scleral icterus.     Conjunctiva/sclera: Conjunctivae normal.   Neck:      Thyroid: No thyromegaly.   Cardiovascular:      Rate and Rhythm: Normal rate and regular rhythm.   Pulmonary:      Effort: Pulmonary effort is normal. No respiratory distress.   Abdominal:      General: Bowel sounds are normal. There is no distension.      Palpations: Abdomen is soft. There is no mass.      Tenderness: There is no abdominal tenderness. There is no rebound.   Musculoskeletal:         General: No tenderness or deformity. Normal range of motion.      Cervical back: Neck supple.   Lymphadenopathy:      Cervical: No cervical adenopathy.   Skin:     General: Skin is warm and dry.      Coloration: Skin is not jaundiced.      Findings: No erythema or rash.   Neurological:      Mental Status: He is alert and oriented to person, place, and time.      Cranial Nerves: No cranial nerve deficit.      Motor: Weakness (generalized) present.      Coordination: Coordination normal.      Gait: Gait is intact. Abnormal gait: in wheelchair.   Psychiatric:         Mood and Affect: Affect normal.         Cognition and Memory: Memory normal.         Judgment: Judgment normal.          Diagnostic Tests:  Significant Imaging: I have reviewed and interpreted all pertinent imaging results/findings.      Laboratory Data:  All pertinent labs have been  reviewed.  Labs:   Lab Results   Component Value Date    WBC 6.24 07/29/2024    RBC 4.20 (L) 07/29/2024    HGB 12.8 (L) 07/29/2024    HCT 40.5 07/29/2024    MCV 96 07/29/2024     07/29/2024     (H) 07/29/2024     07/29/2024    K 4.5 07/29/2024    BUN 42 (H) 07/29/2024    CREATININE 1.5 (H) 07/29/2024    AST 44 (H) 07/29/2024    ALT 43 07/29/2024    BILITOT 0.7 07/29/2024       Assessment/Plan:   Malignant neoplasm of sigmoid colon  pT3N1a, Stage III   He is status post resection.  Adjuvant chemotherapy for stage III disease was discussed at length with the patient and his family during the initial visit.  See  consult for details.  He declined adjuvant chemotherapy.    Labs remain stable. CEA is normal.    Last CT A/P in 04/2024 without evidence of recurrent or metastatic disease.      We will continue active surveillance.   Colonoscopy is due in 2025.   We will see him again in April 2025 with labs and CT scan prior. Plan for annual labs and scans to follow.     Pernicious anemia  Continue B12 injections monthly.   Monitor.     YENIFER  Did not tolerate oral iron.    Moderate episode of recurrent major depressive disorder  Continue Zoloft and Wellbutrin.     A fib  Continue Cardiology follow up, on xeralto.     DM  Diet controlled.     Dehydration  Mild increase in kidney functions  Discussed increasing PO fluids at home   Will give 1L IVF today.     Discussion:   No follow-ups on file.    Patient is in agreement with the proposed treatment plan. All questions were answered to the patient's satisfaction. Pt knows to call clinic if anything is needed before the next clinic visit.    Patient discussed with collaborating physician, Dr. Ascencio.    At least 30 minutes were spent today on this encounter including face to face time with the patient, data gathering/interpretation and documentation.       Maritza Elias, MSN, APRN, ACCNS-  Hematology and Medical Oncology  Clinical Nurse Specialist to   Sonu, Dr. Mcguire & Dr. Perez    Route Chart for Scheduling    Med Onc Chart Routing      Follow up with physician . RTC in April 2025 with labs (CBC CMP CEA) and CT CAP 1-2 days prior to see Dr. Ascencio to review scan results   Follow up with FLORENCIA    Infusion scheduling note    Injection scheduling note    Labs    Imaging    Pharmacy appointment    Other referrals

## 2024-08-07 ENCOUNTER — TELEPHONE (OUTPATIENT)
Dept: HEMATOLOGY/ONCOLOGY | Facility: CLINIC | Age: 84
End: 2024-08-07
Payer: MEDICARE

## 2024-08-12 ENCOUNTER — TELEPHONE (OUTPATIENT)
Dept: HEMATOLOGY/ONCOLOGY | Facility: CLINIC | Age: 84
End: 2024-08-12
Payer: MEDICARE

## 2024-08-12 NOTE — TELEPHONE ENCOUNTER
Wife thought they had CT scan on July 29.  Explained to her that pt not due for CT until April 2025.    We will schedule that along with labs and clinic visit      ----- Message from Lauren Leslie sent at 8/12/2024 10:36 AM CDT -----  Regarding: CT results  Contact: Pt @ [phone]  Pt is calling to speak to someone in the office to discuss test results. Pt is asking for a return call soon. Thanks.         Who Called:  pt's wife

## 2024-10-22 PROBLEM — R29.6 MULTIPLE FALLS: Status: ACTIVE | Noted: 2024-10-22

## 2024-10-22 PROBLEM — S42.301P: Status: ACTIVE | Noted: 2024-10-22

## 2024-10-22 PROBLEM — S22.49XK: Status: ACTIVE | Noted: 2024-10-22

## 2024-10-25 NOTE — TELEPHONE ENCOUNTER
----- Message from Lauren Perlaghada Leslie sent at 12/20/2023 11:42 AM CST -----  Regarding: Stomach pains and possibly getting scheduled  Contact: pt @[phone]  Pt is calling to speak to someone in the office to discuss symptoms they are having. Pt is asking for a call back today. Please call to advise. Thanks.         Symptoms: Stomach pain and Diarrhea          How long has patient had these symptoms: six months         Best call back number: [phone]           
Spoke with patient. He has an appointment with Dr Tai on 1/17.He will keep that appointment as scheduled.  
4 (moderate pain)

## 2024-10-30 PROBLEM — R63.8 INCREASED NUTRITIONAL NEEDS: Status: ACTIVE | Noted: 2024-10-30

## 2025-03-07 ENCOUNTER — TELEPHONE (OUTPATIENT)
Dept: HEMATOLOGY/ONCOLOGY | Facility: CLINIC | Age: 85
End: 2025-03-07
Payer: MEDICARE

## 2025-03-07 DIAGNOSIS — Z85.038 HISTORY OF COLON CANCER, STAGE III: Primary | ICD-10-CM

## 2025-03-14 ENCOUNTER — TELEPHONE (OUTPATIENT)
Dept: HEMATOLOGY/ONCOLOGY | Facility: CLINIC | Age: 85
End: 2025-03-14
Payer: MEDICARE

## 2025-03-14 NOTE — TELEPHONE ENCOUNTER
----- Message from Marat sent at 3/14/2025 12:56 PM CDT -----  Regarding: Appt  Contact: Ceci  [phone]  Current Appt date:    04/16/25Type of Appt:  Lona pt  Physician:   Santos Ascencio, MDReason for rescheduling: Would need later appt time  if not pt will keep 8am appt Caller:    Ceci pt's spouse Contact Preference: [phone]

## 2025-04-02 PROBLEM — S72.22XA CLOSED LEFT SUBTROCHANTERIC FEMUR FRACTURE: Status: ACTIVE | Noted: 2025-04-02

## 2025-04-06 PROBLEM — F03.A0 MILD DEMENTIA: Status: ACTIVE | Noted: 2025-04-06

## 2025-04-09 ENCOUNTER — TELEPHONE (OUTPATIENT)
Dept: HEMATOLOGY/ONCOLOGY | Facility: CLINIC | Age: 85
End: 2025-04-09
Payer: MEDICARE

## 2025-04-09 PROBLEM — K59.09 OTHER CONSTIPATION: Status: ACTIVE | Noted: 2025-04-09

## 2025-04-09 NOTE — TELEPHONE ENCOUNTER
Patient on surveillance mode.  He is currently in the hospital and had hip surgery.  Wife said he'll have rehab when discharged.    Requests to resched follow up to May.  Changed visit to 5/29 and labs and CT 2 days prior.        ----- Message from Gage sent at 4/9/2025 11:01 AM CDT -----  Consult/AdvisoryName Of Caller: Ceci Howe (Spouse)Contact Preference?: [phone]   Provider Name: Sonu    Does patient feel the need to be seen today? No  What is the nature of the call?: Calling to discuss future plan of care. Stating pt recently had hip surgery that will affect attendance for future appts

## 2025-05-29 ENCOUNTER — TELEPHONE (OUTPATIENT)
Dept: HEMATOLOGY/ONCOLOGY | Facility: CLINIC | Age: 85
End: 2025-05-29
Payer: MEDICARE

## 2025-05-29 NOTE — TELEPHONE ENCOUNTER
"Contacted pt to discuss 05/29 f/up w/ Ct Scan-CAP/Labs. Pt's prev CT scan appts had been cancelled, but f/up appt was not.  Spk w/ pt's wife, pt had recent hip surgery and is slowly recovering.  Would like to cancel the 05/29 f/up and r/s when he is doing better.  Advised I would cx appt today and added a "remind me" to check in w/ them in 2-3wks.  Asked to call if anything else is needed  "

## 2025-06-05 PROBLEM — I50.33 ACUTE ON CHRONIC DIASTOLIC CONGESTIVE HEART FAILURE: Status: ACTIVE | Noted: 2025-06-05

## 2025-06-06 PROBLEM — I50.9 ACUTE ON CHRONIC CONGESTIVE HEART FAILURE: Status: ACTIVE | Noted: 2025-06-06

## 2025-06-18 ENCOUNTER — TELEPHONE (OUTPATIENT)
Dept: HEMATOLOGY/ONCOLOGY | Facility: CLINIC | Age: 85
End: 2025-06-18
Payer: MEDICARE

## 2025-06-18 NOTE — TELEPHONE ENCOUNTER
Copied from CRM #[id number]. Topic: Appointments - Appointment Rescheduling  >> Jun 18, 2025  3:57 PM Rona wrote:  Scheduling Request           Appt Type:  Ep      Date/Time Preference: next available      Caller Name: pt wife      Contact Preference: [phone]      Comment: Pt has been sick and would like to r/s appts he missed back in April. Please call to advise thank you

## 2025-06-20 ENCOUNTER — TELEPHONE (OUTPATIENT)
Dept: HEMATOLOGY/ONCOLOGY | Facility: CLINIC | Age: 85
End: 2025-06-20
Payer: MEDICARE

## 2025-06-20 NOTE — TELEPHONE ENCOUNTER
I called the pt to see if he would be able to make it to Labs and Ct non Contrast and CT w/Contrast. The wife answered and stated no he can't make that because he is in a Nursing home now called [facility] and we would have to call them and have them set transportation up. I let The wife know I will relay this information to Chandler FELICIANO, and I will call them back on Monday.

## 2025-06-20 NOTE — TELEPHONE ENCOUNTER
Attempted to return patient call. No answer, left VM on spouse's phone. Will tentatively schedule labs scans for 6/24/25 pending confirmation from patient.

## 2025-06-23 ENCOUNTER — TELEPHONE (OUTPATIENT)
Dept: HEMATOLOGY/ONCOLOGY | Facility: CLINIC | Age: 85
End: 2025-06-23
Payer: MEDICARE

## 2025-06-23 DIAGNOSIS — Z85.038 HISTORY OF COLON CANCER, STAGE III: Primary | ICD-10-CM

## 2025-06-23 NOTE — TELEPHONE ENCOUNTER
I called  [facility] @ [phone] to get the pt set up with transportation for his Ct appts. I spoke with Yosvany and she asked if we can fax over lab orders for the labs that are due prior to the follow up with Dr Ascencio @ [phone]. Ms Bra asked if we could include our Fax number so she can Fax us back. I sent a message to Dr Ascencio's RN Damien.

## 2025-07-11 ENCOUNTER — TELEPHONE (OUTPATIENT)
Dept: HEMATOLOGY/ONCOLOGY | Facility: CLINIC | Age: 85
End: 2025-07-11
Payer: MEDICARE

## 2025-07-11 NOTE — TELEPHONE ENCOUNTER
I called Pt to confirm VV with Serafin Gale on Monday Advised Pt to contact the clinic if he has any concerns regarding Appt.

## 2025-07-14 ENCOUNTER — TELEPHONE (OUTPATIENT)
Dept: HEMATOLOGY/ONCOLOGY | Facility: CLINIC | Age: 85
End: 2025-07-14
Payer: MEDICARE

## 2025-07-14 NOTE — TELEPHONE ENCOUNTER
Copied from CRM #[id number]. Topic: General Inquiry - Patient Advice  >> Jul 14, 2025  2:41 PM Grecia wrote:     Consult/Advisory     Name Of Caller:Placido Howe         Contact Preference:[phone] (home)      Nature of call:Patient is calling to let doctor know that patient can't access virtual appt for 2:30. Requesting a call back

## 2025-07-14 NOTE — TELEPHONE ENCOUNTER
Shared with MD and will reschedule patient.  Returned call and to get rescheduled.  Left voicemail asking for return call.  Clinic number given.

## 2025-07-17 ENCOUNTER — TELEPHONE (OUTPATIENT)
Dept: HEMATOLOGY/ONCOLOGY | Facility: CLINIC | Age: 85
End: 2025-07-17
Payer: MEDICARE

## 2025-07-17 NOTE — TELEPHONE ENCOUNTER
Copied from CRM #[id number]. Topic: Appointments - Appointment Scheduling  >> 2025 10:01 AM Lauren wrote:  Pt called in to schedule an appt; no available appts in Epic. Pt is asking for a call back soon to schedule. Thanks.         Patient's DX: f/u appt         Patient requesting call back or [facility] [phone]

## (undated) DEVICE — SEALER LIGASURE LAP 37CM 5MM

## (undated) DEVICE — ELECTRODE REM PLYHSV RETURN 9

## (undated) DEVICE — Device

## (undated) DEVICE — DRAPE ABDOMINAL TIBURON 14X11

## (undated) DEVICE — DRAPE INCISE IOBAN 2 23X17IN

## (undated) DEVICE — KIT ANTIFOG

## (undated) DEVICE — SUT CTD VICRYL VIL BR CR/SH

## (undated) DEVICE — STAPLER CONTOUR 40MM GREEN

## (undated) DEVICE — SEE MEDLINE ITEM 154981

## (undated) DEVICE — SUT 3/0 27IN PDS II VIO MO

## (undated) DEVICE — DRESSING LEUKOPLAST FLEX 1X3IN

## (undated) DEVICE — TUBING SUCTION STERILE

## (undated) DEVICE — LEGGINGS 48X31 INCH

## (undated) DEVICE — LUBRICANT SURGILUBE 2 OZ

## (undated) DEVICE — STAPLER ECHELON PWR CIR 29MM

## (undated) DEVICE — TRAY FOLEY 16FR INFECTION CONT

## (undated) DEVICE — SEE MEDLINE ITEM 152622

## (undated) DEVICE — HEMOSTAT SURGICEL 2X3IN

## (undated) DEVICE — MARKER SKIN STND TIP BLUE BARR

## (undated) DEVICE — TUBING HF INSUFFLATION W/ FLTR

## (undated) DEVICE — POUCH SENSURA MIO 3/8X2 1/8IN

## (undated) DEVICE — TROCAR ENDOPATH XCEL 5X75MM

## (undated) DEVICE — SEE MEDLINE ITEM 146417

## (undated) DEVICE — SOL NS 1000CC

## (undated) DEVICE — SEE MEDLINE ITEM 152487

## (undated) DEVICE — COVER LIGHT HANDLE 80/CA

## (undated) DEVICE — TROCAR ENDOPATH XCEL 5MM 7.5CM

## (undated) DEVICE — SEE MEDLINE ITEM 156902

## (undated) DEVICE — SUT COATED VICRYL 4/0 27IN

## (undated) DEVICE — SEE MEDLINE ITEM 157144

## (undated) DEVICE — NDL BOX COUNTER